# Patient Record
Sex: FEMALE | Race: WHITE | NOT HISPANIC OR LATINO | Employment: OTHER | URBAN - NONMETROPOLITAN AREA
[De-identification: names, ages, dates, MRNs, and addresses within clinical notes are randomized per-mention and may not be internally consistent; named-entity substitution may affect disease eponyms.]

---

## 2017-01-23 ENCOUNTER — COMMUNICATION - GICH (OUTPATIENT)
Dept: FAMILY MEDICINE | Facility: OTHER | Age: 67
End: 2017-01-23

## 2017-01-23 DIAGNOSIS — E87.6 HYPOKALEMIA: ICD-10-CM

## 2017-01-23 DIAGNOSIS — I10 ESSENTIAL (PRIMARY) HYPERTENSION: ICD-10-CM

## 2017-03-02 ENCOUNTER — COMMUNICATION - GICH (OUTPATIENT)
Dept: FAMILY MEDICINE | Facility: OTHER | Age: 67
End: 2017-03-02

## 2017-03-03 ENCOUNTER — OFFICE VISIT - GICH (OUTPATIENT)
Dept: FAMILY MEDICINE | Facility: OTHER | Age: 67
End: 2017-03-03

## 2017-03-03 ENCOUNTER — HOSPITAL ENCOUNTER (OUTPATIENT)
Dept: RADIOLOGY | Facility: OTHER | Age: 67
End: 2017-03-03
Attending: FAMILY MEDICINE

## 2017-03-03 ENCOUNTER — HISTORY (OUTPATIENT)
Dept: FAMILY MEDICINE | Facility: OTHER | Age: 67
End: 2017-03-03

## 2017-03-03 DIAGNOSIS — M53.3 SACROCOCCYGEAL DISORDERS, NOT ELSEWHERE CLASSIFIED: ICD-10-CM

## 2017-03-03 DIAGNOSIS — G89.29 OTHER CHRONIC PAIN: ICD-10-CM

## 2017-03-03 DIAGNOSIS — M54.41 LOW BACK PAIN WITH RIGHT-SIDED SCIATICA: ICD-10-CM

## 2017-03-06 ENCOUNTER — AMBULATORY - GICH (OUTPATIENT)
Dept: SCHEDULING | Facility: OTHER | Age: 67
End: 2017-03-06

## 2017-03-06 ENCOUNTER — AMBULATORY - GICH (OUTPATIENT)
Dept: FAMILY MEDICINE | Facility: OTHER | Age: 67
End: 2017-03-06

## 2017-03-06 ENCOUNTER — COMMUNICATION - GICH (OUTPATIENT)
Dept: FAMILY MEDICINE | Facility: OTHER | Age: 67
End: 2017-03-06

## 2017-03-06 DIAGNOSIS — M53.3 SACROCOCCYGEAL DISORDERS, NOT ELSEWHERE CLASSIFIED: ICD-10-CM

## 2017-03-06 DIAGNOSIS — M54.50 LOW BACK PAIN: ICD-10-CM

## 2017-03-06 DIAGNOSIS — G89.29 OTHER CHRONIC PAIN: ICD-10-CM

## 2017-03-09 ENCOUNTER — HOSPITAL ENCOUNTER (OUTPATIENT)
Dept: RADIOLOGY | Facility: OTHER | Age: 67
End: 2017-03-09
Attending: FAMILY MEDICINE

## 2017-03-09 DIAGNOSIS — M54.41 LOW BACK PAIN WITH RIGHT-SIDED SCIATICA: ICD-10-CM

## 2017-03-14 ENCOUNTER — COMMUNICATION - GICH (OUTPATIENT)
Dept: FAMILY MEDICINE | Facility: OTHER | Age: 67
End: 2017-03-14

## 2017-03-17 ENCOUNTER — HOSPITAL ENCOUNTER (OUTPATIENT)
Dept: RADIOLOGY | Facility: OTHER | Age: 67
End: 2017-03-17
Attending: FAMILY MEDICINE

## 2017-03-17 DIAGNOSIS — M53.3 SACROCOCCYGEAL DISORDERS, NOT ELSEWHERE CLASSIFIED: ICD-10-CM

## 2017-03-17 DIAGNOSIS — G89.29 OTHER CHRONIC PAIN: ICD-10-CM

## 2017-03-18 ENCOUNTER — AMBULATORY - GICH (OUTPATIENT)
Dept: SCHEDULING | Facility: OTHER | Age: 67
End: 2017-03-18

## 2017-03-23 ENCOUNTER — OFFICE VISIT - GICH (OUTPATIENT)
Dept: FAMILY MEDICINE | Facility: OTHER | Age: 67
End: 2017-03-23

## 2017-03-23 ENCOUNTER — HISTORY (OUTPATIENT)
Dept: FAMILY MEDICINE | Facility: OTHER | Age: 67
End: 2017-03-23

## 2017-03-23 DIAGNOSIS — L30.9 DERMATITIS: ICD-10-CM

## 2017-03-23 DIAGNOSIS — Z00.00 ENCOUNTER FOR GENERAL ADULT MEDICAL EXAMINATION WITHOUT ABNORMAL FINDINGS: ICD-10-CM

## 2017-03-23 DIAGNOSIS — E78.5 HYPERLIPIDEMIA: ICD-10-CM

## 2017-03-23 DIAGNOSIS — Z12.31 ENCOUNTER FOR SCREENING MAMMOGRAM FOR MALIGNANT NEOPLASM OF BREAST: ICD-10-CM

## 2017-03-23 DIAGNOSIS — I10 ESSENTIAL (PRIMARY) HYPERTENSION: ICD-10-CM

## 2017-03-23 DIAGNOSIS — M46.98 UNSPECIFIED INFLAMMATORY SPONDYLOPATHY, SACRAL AND SACROCOCCYGEAL REGION (H): ICD-10-CM

## 2017-03-23 DIAGNOSIS — E87.6 HYPOKALEMIA: ICD-10-CM

## 2017-03-23 DIAGNOSIS — G56.00 CARPAL TUNNEL SYNDROME: ICD-10-CM

## 2017-03-23 DIAGNOSIS — Z23 ENCOUNTER FOR IMMUNIZATION: ICD-10-CM

## 2017-03-23 LAB
ANION GAP - HISTORICAL: 9 (ref 5–18)
BUN SERPL-MCNC: 27 MG/DL (ref 7–25)
BUN/CREAT RATIO - HISTORICAL: 32
CALCIUM SERPL-MCNC: 9.9 MG/DL (ref 8.6–10.3)
CHLORIDE SERPLBLD-SCNC: 102 MMOL/L (ref 98–107)
CHOL/HDL RATIO - HISTORICAL: 3.82
CHOLESTEROL TOTAL: 210 MG/DL
CO2 SERPL-SCNC: 28 MMOL/L (ref 21–31)
CREAT SERPL-MCNC: 0.84 MG/DL (ref 0.7–1.3)
GFR IF NOT AFRICAN AMERICAN - HISTORICAL: >60 ML/MIN/1.73M2
GLUCOSE SERPL-MCNC: 103 MG/DL (ref 70–105)
HDLC SERPL-MCNC: 55 MG/DL (ref 23–92)
LDLC SERPL CALC-MCNC: 134 MG/DL
NON-HDL CHOLESTEROL - HISTORICAL: 155 MG/DL
PATIENT STATUS - HISTORICAL: ABNORMAL
POTASSIUM SERPL-SCNC: 3.7 MMOL/L (ref 3.5–5.1)
SODIUM SERPL-SCNC: 139 MMOL/L (ref 133–143)
TRIGL SERPL-MCNC: 106 MG/DL

## 2017-03-30 ENCOUNTER — HISTORY (OUTPATIENT)
Dept: RADIOLOGY | Facility: OTHER | Age: 67
End: 2017-03-30

## 2017-03-30 ENCOUNTER — HOSPITAL ENCOUNTER (OUTPATIENT)
Dept: RADIOLOGY | Facility: OTHER | Age: 67
End: 2017-03-30
Attending: FAMILY MEDICINE

## 2017-03-30 DIAGNOSIS — Z12.31 ENCOUNTER FOR SCREENING MAMMOGRAM FOR MALIGNANT NEOPLASM OF BREAST: ICD-10-CM

## 2017-04-07 ENCOUNTER — COMMUNICATION - GICH (OUTPATIENT)
Dept: FAMILY MEDICINE | Facility: OTHER | Age: 67
End: 2017-04-07

## 2017-04-07 DIAGNOSIS — E78.5 HYPERLIPIDEMIA: ICD-10-CM

## 2017-04-17 ENCOUNTER — AMBULATORY - GICH (OUTPATIENT)
Dept: FAMILY MEDICINE | Facility: OTHER | Age: 67
End: 2017-04-17

## 2017-04-21 ENCOUNTER — AMBULATORY - GICH (OUTPATIENT)
Dept: SCHEDULING | Facility: OTHER | Age: 67
End: 2017-04-21

## 2017-06-16 ENCOUNTER — AMBULATORY - GICH (OUTPATIENT)
Dept: FAMILY MEDICINE | Facility: OTHER | Age: 67
End: 2017-06-16

## 2017-06-16 ENCOUNTER — HISTORY (OUTPATIENT)
Dept: FAMILY MEDICINE | Facility: OTHER | Age: 67
End: 2017-06-16

## 2017-06-16 DIAGNOSIS — I10 ESSENTIAL (PRIMARY) HYPERTENSION: ICD-10-CM

## 2017-06-16 DIAGNOSIS — Z01.818 ENCOUNTER FOR OTHER PREPROCEDURAL EXAMINATION: ICD-10-CM

## 2017-06-16 DIAGNOSIS — E87.6 HYPOKALEMIA: ICD-10-CM

## 2017-06-16 DIAGNOSIS — G56.00 CARPAL TUNNEL SYNDROME: ICD-10-CM

## 2017-06-16 DIAGNOSIS — E78.5 HYPERLIPIDEMIA: ICD-10-CM

## 2017-06-16 LAB
ALT (SGPT) - HISTORICAL: 21 IU/L (ref 7–52)
ANION GAP - HISTORICAL: 11 (ref 5–18)
AST SERPL-CCNC: 21 IU/L (ref 13–39)
BUN SERPL-MCNC: 28 MG/DL (ref 7–25)
BUN/CREAT RATIO - HISTORICAL: 30
CALCIUM SERPL-MCNC: 9.9 MG/DL (ref 8.6–10.3)
CHLORIDE SERPLBLD-SCNC: 102 MMOL/L (ref 98–107)
CHOL/HDL RATIO - HISTORICAL: 2.28
CHOLESTEROL TOTAL: 114 MG/DL
CO2 SERPL-SCNC: 27 MMOL/L (ref 21–31)
CREAT SERPL-MCNC: 0.93 MG/DL (ref 0.7–1.3)
GFR IF NOT AFRICAN AMERICAN - HISTORICAL: 60 ML/MIN/1.73M2
GLUCOSE SERPL-MCNC: 102 MG/DL (ref 70–105)
HDLC SERPL-MCNC: 50 MG/DL (ref 23–92)
LDLC SERPL CALC-MCNC: 51 MG/DL
NON-HDL CHOLESTEROL - HISTORICAL: 64 MG/DL
PATIENT STATUS - HISTORICAL: NORMAL
POTASSIUM SERPL-SCNC: 3.4 MMOL/L (ref 3.5–5.1)
SODIUM SERPL-SCNC: 140 MMOL/L (ref 133–143)
TRIGL SERPL-MCNC: 63 MG/DL

## 2017-06-29 ENCOUNTER — AMBULATORY - GICH (OUTPATIENT)
Dept: LAB | Facility: OTHER | Age: 67
End: 2017-06-29

## 2017-06-29 DIAGNOSIS — E87.6 HYPOKALEMIA: ICD-10-CM

## 2017-06-29 LAB
ANION GAP - HISTORICAL: 6 (ref 5–18)
BUN SERPL-MCNC: 23 MG/DL (ref 7–25)
BUN/CREAT RATIO - HISTORICAL: 25
CALCIUM SERPL-MCNC: 9.9 MG/DL (ref 8.6–10.3)
CHLORIDE SERPLBLD-SCNC: 105 MMOL/L (ref 98–107)
CO2 SERPL-SCNC: 27 MMOL/L (ref 21–31)
CREAT SERPL-MCNC: 0.93 MG/DL (ref 0.7–1.3)
GFR IF NOT AFRICAN AMERICAN - HISTORICAL: 60 ML/MIN/1.73M2
GLUCOSE SERPL-MCNC: 119 MG/DL (ref 70–105)
POTASSIUM SERPL-SCNC: 3.8 MMOL/L (ref 3.5–5.1)
SODIUM SERPL-SCNC: 138 MMOL/L (ref 133–143)

## 2017-06-30 ENCOUNTER — COMMUNICATION - GICH (OUTPATIENT)
Dept: FAMILY MEDICINE | Facility: OTHER | Age: 67
End: 2017-06-30

## 2017-07-06 ENCOUNTER — AMBULATORY - GICH (OUTPATIENT)
Dept: FAMILY MEDICINE | Facility: OTHER | Age: 67
End: 2017-07-06

## 2017-07-06 DIAGNOSIS — E87.6 HYPOKALEMIA: ICD-10-CM

## 2017-12-27 NOTE — PROGRESS NOTES
"Patient Information     Patient Name MRN Arlyn Winters [id number] Female 1950      Progress Notes by Brooke Castaneda MD at 2017  8:45 AM     Author:  Brooke Castaneda MD Service:  (none) Author Type:  Physician     Filed:  2017 12:15 PM Encounter Date:  2017 Status:  Signed     :  Brooke Castaneda MD (Physician)            ----------------- PREOPERATIVE EXAM ------------------  2017    SUBJECTIVE:  Arlyn Holder is a 67 y.o. female here for preoperative optimization.    I was asked to see Arlyn Holder by Dr. Méndez for  preoperative evaluation due to hypertension.    Date of Surgery: 17  Type of Surgery:  Left Carpal tunnel release.  Surgeon: [facility]:  [facility]    HPI:  Has been dealing with carpal tunnel symptoms for the past 5 years.  Worse in the past year.  Now is waking her up with pain in the middle of the night.    Has had stiffness in her neck lately.  Her shoulders have not been painful.      Fever/Chills or other infectious symptoms in past month:  (NO)   >10lb weight loss in past two months:  (NO)     Health Care Directive/Code status: No living will.  Full code.  Hx of blood transfusions:   (NO)   History of VRE/MRSA:  (NO) Date: n/a Site: n/a    Preoperative Evaluation: Obstructive Sleep Apnea screening    S: Snore -  Do you snore loudly? (louder than talking or loud enough to be heard through closed doors)(NO)  T: Tired - Do you often feel tired, fatigued, or sleepy during the daytime?(YES)  O: Observed - Has anyone ever observed you stop breathing during your sleep?(NO)  P: Pressure - Do you have or are you being treated for high blood pressure?(YES)  B: BMI - BMI greater than 35kg/m2?(NO)  A: Age - Age over 50 years old?(YES)  N: Neck - Neck circumference greater than 40 cm?(NO)  G: Gender - Gender: Male?(NO)    Total number of \"YES\" responses:  3    Scoring: Low risk of ANURAG 0-2  At Risk of ANURAG: " >3 High Risk of ANURAG: 5-8        Patient Active Problem List       Diagnosis  Date Noted     Ascending aorta dilatation (HC)  2016     3.5-3.6 cm on CT 3/2016 - follow up in 1 year        Health care maintenance  2015     Arthritis of right wrist  2013     GERD (gastroesophageal reflux disease)  2012     Right shoulder pain  2012     Tendonitis of wrist, right  2012     METATARSALGIA       PRONATION FOOT OR ANKLE, ACQUIRED       OSTEOARTHRITIS OF FEET  10/04/2011     PLANTAR FASCIITIS  10/04/2011     SKIN LESIONS, MULTIPLE  10/04/2011     HYPERLIPIDEMIA  10/04/2011     CARPAL TUNNEL SYNDROME  2010     ROTATOR CUFF SYNDROME, RIGHT  2010     HYPERTENSION       CHONDROMALACIA PATELLA       POSTMENOPAUSAL STATUS       Postmenopausal now off hormone replacement therapy.          UTERINE PROLAPSE       preloader comment: selections available to preload differs from paper chart.    *Pelvic floor relaxation          SUBLUXATION, JOINT       preloader comment: selections available to preload differs from paper chart.    *Subluxation L3-L4 with mild stenosis          DEGENERATIVE DISC DISEASE, CERVICAL SPINE       Multilevel degenerative disk disease in cervical lumbar spine without   radiculopathy.  Add also possible right cubital tunnel syndrome at elbow.          BURSITIS, RIGHT HIP         Past Medical History:     Diagnosis  Date     Ascending aorta dilatation (HC) 3/23/2016    3.5-3.6 cm on CT 3/2016 - follow up in 1 year       Bunion, right foot      Carpal tunnel syndrome 2010     GERD (gastroesophageal reflux disease) 12/3/2012     History of pregnancy      3, Para 3 with three vaginal deliveries      Hyperlipidemia 10/4/2011     HYPERTENSION      Impingement syndrome of right shoulder 10/09/07     Uterine prolapse     preloader comment: selections available to preload differs from paper chart.   *Pelvic floor relaxation         Past Surgical History:       Procedure  Laterality Date     BUNIONECTOMY  1999    right       CARPAL TUNNEL RELEASE Right     Dr. Méndez       COLONOSCOPY DIAGNOSTIC  3/16/15     F/U 2025       COLONOSCOPY SCREENING  2004     normal       PELVIC LAPAROSCOPY  1989    for pelvic adhesion       PELVIC LAPAROSCOPY  11/08    vaginal vault prolapse - Dr. Mumtaz Marshall - anterior/posterior repair with bladder mesh.       VAGINAL HYSTERECTOMY  1982    for uterine prolapse with A&P repair.  Tube and ovaries still in place.           Current Outpatient Prescriptions       Medication  Sig Dispense Refill     atorvastatin (LIPITOR) 20 mg tablet Take 1 tablet by mouth once daily. 90 tablet 3     cod liver oil oil Take  by mouth.       Coenzyme Q10 10 mg cap Take 1 capsule by mouth once daily.       CYANOCOBALAMIN, VITAMIN B-12, ORAL Take  by mouth.       cyclobenzaprine (FLEXERIL) 10 mg tablet Take 1 tablet by mouth 3 times daily. 30 tablet 0     diclofenac 1 % topical (VOLTAREN) gel Apply  topically to affected area(s) 2 times daily. 100 g 11     flaxseed-omega3,6,9-fatty acid 1,200-540-132 mg cap Take 1 capsule by mouth 3 times daily.  0     glucosamine frank 2KCl-chondroit 500-400 mg tab Take  by mouth.       hydroCHLOROthiazide (HCTZ) 25 mg tablet Take 1 tablet by mouth once daily. 90 tablet 3     Lecithin 400 mg capsule Take 1 capsule by mouth 2 times daily.       omega-3 acid ethyl esters (LOVAZA;OMACOR) 1 gram capsule Take 1,000 mg by mouth.       potassium chloride (K-DUR) 20 mEq Extended-Release tablet Take 1 tablet by mouth once daily with a meal. 90 tablet 3     Red Yeast Rice Extract 600 mg cap Take  by mouth.       triamcinolone (ARISTOCORT; KENALOG) 0.1 % cream Apply  topically to affected area(s) 2 times daily. 30 g 11     vitamin e 100 unit capsule Take  by mouth.       No current facility-administered medications for this visit.      Medications have been reviewed by me and are current to the best of my knowledge and ability.    Recent use  of: aspirin    Allergies:  Allergies      Allergen   Reactions     Codeine  Nausea And Vomiting     Naprosyn [Naproxen]  Other - Describe In Comment Field     Mouth sores      Sulfa (Sulfonamide Antibiotics)  *Unknown - Childhood Rxn     Sulfacetamide Sodium  Rash     Had a reaction to sulfa drug but is not sure which one it was-states the reaction resulted in redness      Valium [Diazepam]  Hallucinations       Latex allergy  no    Immunizations:  Immunization History     Administered  Date(s) Administered     Influenza Virus, Unspecified 11/02/2006, 10/01/2007, 10/24/2008, 11/18/2010, 10/04/2011     Influenza, High-dose Inactivated 02/17/2015     Influenza, IIV3 (Age >=3 years) 12/03/2012, 11/07/2013     Pneumococcal Poly,23-Valent (Pneumovax) 12/13/2013     Pneumococcal conj 13-Valent (Prevnar 13) 03/23/2017     Tdap 11/18/2010, 12/03/2012       Family History       Problem   Relation Age of Onset     Diabetes  Father      Hypertension  Father      Heart Disease  Father      Other  Father       hearing loss, kidney problems.       Cancer-breast  Sister      Heart Disease  Mother      Hypertension  Mother      Cancer  Maternal Grandmother      bone cancer in her leg       Psychiatric illness  Maternal Grandmother      depression       Diabetes  Sister      Heart Disease  Sister      angioplasty CAD       Other  Sister      Right ear deafness - surgically repaired       Other  Sister      depression       Diabetes  Sister      Hypertension  Sister      Other  Sister      car accident age 75 with multiple fractures - in nursing home.       Heart Disease  Brother      Heart Disease  Brother      Hypertension  Brother      Other  Brother      vertigo, also kidney problems.       Heart Disease  Son      MI - first at age 48         Denies family hx of bleeding tendencies, anesthesia complications, or other problems with surgery.    Social History       Substance Use Topics         Smoking status:   Passive Smoke  "Exposure - Never Smoker     Smokeless tobacco:   Never Used      Comment:  was a smoker      Alcohol use   No       ROS:    Surgical:  patient denies previous complications from prior surgeries including but not limited to prolonged bleeding, anesthesia complications, dysrhythmias, surgical wound infections, or prolonged hospital stay.  Cardiorespiratory: denies chest pain, palpitations, shortness of breath, cough. Most exertion in the past 2 weeks was yardwork, gone for walks, housework.  Complete ROS otherwise negative except as noted in HPI.     -------------------------------------------------------------    PHYSICAL EXAM:  /98  Pulse 68  Temp 98.1  F (36.7  C) (Tympanic)  Ht 1.477 m (4' 10.17\")  Wt 65.5 kg (144 lb 6.4 oz)  SpO2 96%  Breastfeeding? No  BMI 30 kg/m2    EXAM:  General Appearance: Pleasant, alert, appropriate appearance for age. No acute distress  Head Exam: Normal. Normocephalic, atraumatic.  Eyes: PERRL, EOMI  Ears: Normal TM's bilaterally.   OroPharynx: Mucosa pink and moist. Dentition in good repair.  Neck: Supple, no masses or nodes, no lymphadenopathy.  No thyromegaly.  Lungs: Normal chest wall and respirations. Clear to auscultation, no wheezes or crackles.  Cardiovascular: Regular rate and rhythm. S1, S2, no murmurs.  Gastrointestinal: Soft, nontender, no abnormal masses or organomegaly. BS normal   Musculoskeletal: No edema. No warm or erythematous joints.  Spasm in left trapezius region.  Skin: no concerning or new rashes.  Neurologic Exam: CN 2-12 grossly intact.  Normal gait.  Symmetric DTRs, normal gross motor movement, tone, and coordination. No tremor.  Psychiatric Exam: Alert and oriented, appropriate affect.      EKG:  Sinus bradycardia with a rate of 59 beats per minute.  No ST or T wave changes noted.    Results for orders placed or performed in visit on 06/16/17      LIPID PANEL      Result  Value Ref Range    CHOLESTEROL,TOTAL 114 <200 mg/dL    TRIGLYCERIDES " 63 <150 mg/dL    HDL CHOLESTEROL 50 23 - 92 mg/dL    NON-HDL CHOLESTEROL 64 <145 mg/dl    CHOL/HDL RATIO            2.28 <4.50                    LDL CHOLESTEROL 51 <100 mg/dL    PATIENT STATUS            FASTING                   AST (SGOT)      Result  Value Ref Range    AST (SGOT) 21 13 - 39 IU/L   ALT (SGPT)      Result  Value Ref Range    ALT (SGPT) 21 7 - 52 IU/L   BASIC METABOLIC PANEL      Result  Value Ref Range    SODIUM 140 133 - 143 mmol/L    POTASSIUM 3.4 (L) 3.5 - 5.1 mmol/L    CHLORIDE 102 98 - 107 mmol/L    CO2,TOTAL 27 21 - 31 mmol/L    ANION GAP 11 5 - 18                    GLUCOSE 102 70 - 105 mg/dL    CALCIUM 9.9 8.6 - 10.3 mg/dL    BUN 28 (H) 7 - 25 mg/dL    CREATININE 0.93 0.70 - 1.30 mg/dL    BUN/CREAT RATIO           30                    GFR if African American >60 >60 ml/min/1.73m2    GFR if not African American 60 (L) >60 ml/min/1.73m2      ---------------------------------------------------------------    ASSESSEMENT AND PLAN:    Arlyn was seen today for preoperative exam.    Diagnoses and all orders for this visit:    Preop examination    Carpal tunnel syndrome, unspecified laterality    HYPERTENSION  -     EKG 12 LEAD UNIT PERFORMED (PERFORMED TODAY)  -     BASIC METABOLIC PANEL; Future  -     AL ELECTROCARDIOGRAM TRACING  -     BASIC METABOLIC PANEL    Hyperlipidemia, unspecified hyperlipidemia type  -     LIPID PANEL  -     AST (SGOT)  -     ALT (SGPT)    Hypokalemia  -     potassium chloride (K-DUR) 20 mEq Extended-Release tablet; Take 1 tablet by mouth once daily with a meal.        PRE OP RECOMMENDATIONS:    No personal or family history of problems with bleeding or anesthesia. She has had a lot of vomiting with anesthesia in the past.  Patient is able to tolerate greater than 4 METs of activity without any cardiopulmonary symptoms. ASA PS class 2 . No cardiopulmonary workup is neccessary for the current procedure. Please contact the office with any questions or  concerns.    Patient is on chronic pain medications (NO);   Patient is on antiplatlet/anticoagulation (YES)  Other medications that need adjustment perioperatively (YES)    Other:  Patient was advised to call our office and the surgical services with any change in condition or new symptoms if they were to develop between today and their surgical date; especially any cardiopulmonary symptoms or symptoms concerning for an infection.    Lipids improved with addition of atorvastatin.    Mildly low potassium today.  She has been taking her potassium Chloride 10 meq consistently daily.  Will increase to 20 meq daily.    Patient Instructions   Hold aspirin, cod liver oil, flaxseed omega, vitamin E and other supplements at least a week prior to surgery.    The morning of surgery, take only your Hydrochlorothiazide and Potassium Chloride with a small sip of water.       Brooke Castaneda MD ....................  6/16/2017   12:14 PM

## 2017-12-28 NOTE — PATIENT INSTRUCTIONS
Patient Information     Patient Name MRN Arlyn Winters [id number] Female 1950      Patient Instructions by Brooke Castaneda MD at 2017  8:45 AM     Author:  Brooke Castaneda MD Service:  (none) Author Type:  Physician     Filed:  2017  9:21 AM Encounter Date:  2017 Status:  Signed     :  Brooke Castaneda MD (Physician)            Hold aspirin, cod liver oil, flaxseed omega, vitamin E and other supplements at least a week prior to surgery.    The morning of surgery, take only your Hydrochlorothiazide and Potassium Chloride with a small sip of water.

## 2017-12-28 NOTE — TELEPHONE ENCOUNTER
Patient Information     Patient Name MRN Sex Arlyn Ron [id number] Female 1950      Telephone Encounter by Madelin Cifuentes RN at 2017 12:55 AM     Author:  Madelin Cifuentes RN Service:  (none) Author Type:  NURS- Registered Nurse     Filed:  2017 12:58 AM Encounter Date:  2017 Status:  Signed     :  Madelin Cifuentes RN (NURS- Registered Nurse)            Patient states she was in to have lab work drawn today and was to be notifies if she was okay for surgery in am or not. Attempted to contact Dr. Roger Roman but unable to reach. Potassium was 3.8 today. Dr. Quesada contacted and confirmed that patient was okay for surgery. Return call made to patient.

## 2017-12-29 NOTE — H&P
"Patient Information     Patient Name MRN Arlyn Winters [id number] Female 1950      H&P by Brooke Castaneda MD at 2017  8:45 AM     Author:  Brooke Castaneda MD Service:  (none) Author Type:  Physician     Filed:  2017 12:15 PM Encounter Date:  2017 Status:  Signed     :  Brooke Castaneda MD (Physician)            ----------------- PREOPERATIVE EXAM ------------------  2017    SUBJECTIVE:  Arlyn Holder is a 67 y.o. female here for preoperative optimization.    I was asked to see Arlyn Holder by Dr. Méndez for  preoperative evaluation due to hypertension.    Date of Surgery: 17  Type of Surgery:  Left Carpal tunnel release.  Surgeon: [facility]:  [facility]    HPI:  Has been dealing with carpal tunnel symptoms for the past 5 years.  Worse in the past year.  Now is waking her up with pain in the middle of the night.    Has had stiffness in her neck lately.  Her shoulders have not been painful.      Fever/Chills or other infectious symptoms in past month:  (NO)   >10lb weight loss in past two months:  (NO)     Health Care Directive/Code status: No living will.  Full code.  Hx of blood transfusions:   (NO)   History of VRE/MRSA:  (NO) Date: n/a Site: n/a    Preoperative Evaluation: Obstructive Sleep Apnea screening    S: Snore -  Do you snore loudly? (louder than talking or loud enough to be heard through closed doors)(NO)  T: Tired - Do you often feel tired, fatigued, or sleepy during the daytime?(YES)  O: Observed - Has anyone ever observed you stop breathing during your sleep?(NO)  P: Pressure - Do you have or are you being treated for high blood pressure?(YES)  B: BMI - BMI greater than 35kg/m2?(NO)  A: Age - Age over 50 years old?(YES)  N: Neck - Neck circumference greater than 40 cm?(NO)  G: Gender - Gender: Male?(NO)    Total number of \"YES\" responses:  3    Scoring: Low risk of ANURAG 0-2  At Risk of ANURAG: >3 High Risk of " ANURAG: 5-8        Patient Active Problem List       Diagnosis  Date Noted     Ascending aorta dilatation (HC)  2016     3.5-3.6 cm on CT 3/2016 - follow up in 1 year        Health care maintenance  2015     Arthritis of right wrist  2013     GERD (gastroesophageal reflux disease)  2012     Right shoulder pain  2012     Tendonitis of wrist, right  2012     METATARSALGIA       PRONATION FOOT OR ANKLE, ACQUIRED       OSTEOARTHRITIS OF FEET  10/04/2011     PLANTAR FASCIITIS  10/04/2011     SKIN LESIONS, MULTIPLE  10/04/2011     HYPERLIPIDEMIA  10/04/2011     CARPAL TUNNEL SYNDROME  2010     ROTATOR CUFF SYNDROME, RIGHT  2010     HYPERTENSION       CHONDROMALACIA PATELLA       POSTMENOPAUSAL STATUS       Postmenopausal now off hormone replacement therapy.          UTERINE PROLAPSE       preloader comment: selections available to preload differs from paper chart.    *Pelvic floor relaxation          SUBLUXATION, JOINT       preloader comment: selections available to preload differs from paper chart.    *Subluxation L3-L4 with mild stenosis          DEGENERATIVE DISC DISEASE, CERVICAL SPINE       Multilevel degenerative disk disease in cervical lumbar spine without   radiculopathy.  Add also possible right cubital tunnel syndrome at elbow.          BURSITIS, RIGHT HIP         Past Medical History:     Diagnosis  Date     Ascending aorta dilatation (HC) 3/23/2016    3.5-3.6 cm on CT 3/2016 - follow up in 1 year       Bunion, right foot      Carpal tunnel syndrome 2010     GERD (gastroesophageal reflux disease) 12/3/2012     History of pregnancy      3, Para 3 with three vaginal deliveries      Hyperlipidemia 10/4/2011     HYPERTENSION      Impingement syndrome of right shoulder 10/09/07     Uterine prolapse     preloader comment: selections available to preload differs from paper chart.   *Pelvic floor relaxation         Past Surgical History:      Procedure   Laterality Date     BUNIONECTOMY  1999    right       CARPAL TUNNEL RELEASE Right     Dr. Méndez       COLONOSCOPY DIAGNOSTIC  3/16/15     F/U 2025       COLONOSCOPY SCREENING  2004     normal       PELVIC LAPAROSCOPY  1989    for pelvic adhesion       PELVIC LAPAROSCOPY  11/08    vaginal vault prolapse - Dr. Mumtaz Marshall - anterior/posterior repair with bladder mesh.       VAGINAL HYSTERECTOMY  1982    for uterine prolapse with A&P repair.  Tube and ovaries still in place.           Current Outpatient Prescriptions       Medication  Sig Dispense Refill     atorvastatin (LIPITOR) 20 mg tablet Take 1 tablet by mouth once daily. 90 tablet 3     cod liver oil oil Take  by mouth.       Coenzyme Q10 10 mg cap Take 1 capsule by mouth once daily.       CYANOCOBALAMIN, VITAMIN B-12, ORAL Take  by mouth.       cyclobenzaprine (FLEXERIL) 10 mg tablet Take 1 tablet by mouth 3 times daily. 30 tablet 0     diclofenac 1 % topical (VOLTAREN) gel Apply  topically to affected area(s) 2 times daily. 100 g 11     flaxseed-omega3,6,9-fatty acid 1,200-540-132 mg cap Take 1 capsule by mouth 3 times daily.  0     glucosamine frank 2KCl-chondroit 500-400 mg tab Take  by mouth.       hydroCHLOROthiazide (HCTZ) 25 mg tablet Take 1 tablet by mouth once daily. 90 tablet 3     Lecithin 400 mg capsule Take 1 capsule by mouth 2 times daily.       omega-3 acid ethyl esters (LOVAZA;OMACOR) 1 gram capsule Take 1,000 mg by mouth.       potassium chloride (K-DUR) 20 mEq Extended-Release tablet Take 1 tablet by mouth once daily with a meal. 90 tablet 3     Red Yeast Rice Extract 600 mg cap Take  by mouth.       triamcinolone (ARISTOCORT; KENALOG) 0.1 % cream Apply  topically to affected area(s) 2 times daily. 30 g 11     vitamin e 100 unit capsule Take  by mouth.       No current facility-administered medications for this visit.      Medications have been reviewed by me and are current to the best of my knowledge and ability.    Recent use of:  aspirin    Allergies:  Allergies      Allergen   Reactions     Codeine  Nausea And Vomiting     Naprosyn [Naproxen]  Other - Describe In Comment Field     Mouth sores      Sulfa (Sulfonamide Antibiotics)  *Unknown - Childhood Rxn     Sulfacetamide Sodium  Rash     Had a reaction to sulfa drug but is not sure which one it was-states the reaction resulted in redness      Valium [Diazepam]  Hallucinations       Latex allergy  no    Immunizations:  Immunization History     Administered  Date(s) Administered     Influenza Virus, Unspecified 11/02/2006, 10/01/2007, 10/24/2008, 11/18/2010, 10/04/2011     Influenza, High-dose Inactivated 02/17/2015     Influenza, IIV3 (Age >=3 years) 12/03/2012, 11/07/2013     Pneumococcal Poly,23-Valent (Pneumovax) 12/13/2013     Pneumococcal conj 13-Valent (Prevnar 13) 03/23/2017     Tdap 11/18/2010, 12/03/2012       Family History       Problem   Relation Age of Onset     Diabetes  Father      Hypertension  Father      Heart Disease  Father      Other  Father       hearing loss, kidney problems.       Cancer-breast  Sister      Heart Disease  Mother      Hypertension  Mother      Cancer  Maternal Grandmother      bone cancer in her leg       Psychiatric illness  Maternal Grandmother      depression       Diabetes  Sister      Heart Disease  Sister      angioplasty CAD       Other  Sister      Right ear deafness - surgically repaired       Other  Sister      depression       Diabetes  Sister      Hypertension  Sister      Other  Sister      car accident age 75 with multiple fractures - in nursing home.       Heart Disease  Brother      Heart Disease  Brother      Hypertension  Brother      Other  Brother      vertigo, also kidney problems.       Heart Disease  Son      MI - first at age 48         Denies family hx of bleeding tendencies, anesthesia complications, or other problems with surgery.    Social History       Substance Use Topics         Smoking status:   Passive Smoke Exposure -  "Never Smoker     Smokeless tobacco:   Never Used      Comment:  was a smoker      Alcohol use   No       ROS:    Surgical:  patient denies previous complications from prior surgeries including but not limited to prolonged bleeding, anesthesia complications, dysrhythmias, surgical wound infections, or prolonged hospital stay.  Cardiorespiratory: denies chest pain, palpitations, shortness of breath, cough. Most exertion in the past 2 weeks was yardwork, gone for walks, housework.  Complete ROS otherwise negative except as noted in HPI.     -------------------------------------------------------------    PHYSICAL EXAM:  /98  Pulse 68  Temp 98.1  F (36.7  C) (Tympanic)  Ht 1.477 m (4' 10.17\")  Wt 65.5 kg (144 lb 6.4 oz)  SpO2 96%  Breastfeeding? No  BMI 30 kg/m2    EXAM:  General Appearance: Pleasant, alert, appropriate appearance for age. No acute distress  Head Exam: Normal. Normocephalic, atraumatic.  Eyes: PERRL, EOMI  Ears: Normal TM's bilaterally.   OroPharynx: Mucosa pink and moist. Dentition in good repair.  Neck: Supple, no masses or nodes, no lymphadenopathy.  No thyromegaly.  Lungs: Normal chest wall and respirations. Clear to auscultation, no wheezes or crackles.  Cardiovascular: Regular rate and rhythm. S1, S2, no murmurs.  Gastrointestinal: Soft, nontender, no abnormal masses or organomegaly. BS normal   Musculoskeletal: No edema. No warm or erythematous joints.  Spasm in left trapezius region.  Skin: no concerning or new rashes.  Neurologic Exam: CN 2-12 grossly intact.  Normal gait.  Symmetric DTRs, normal gross motor movement, tone, and coordination. No tremor.  Psychiatric Exam: Alert and oriented, appropriate affect.      EKG:  Sinus bradycardia with a rate of 59 beats per minute.  No ST or T wave changes noted.    Results for orders placed or performed in visit on 06/16/17      LIPID PANEL      Result  Value Ref Range    CHOLESTEROL,TOTAL 114 <200 mg/dL    TRIGLYCERIDES 63 <150 " mg/dL    HDL CHOLESTEROL 50 23 - 92 mg/dL    NON-HDL CHOLESTEROL 64 <145 mg/dl    CHOL/HDL RATIO            2.28 <4.50                    LDL CHOLESTEROL 51 <100 mg/dL    PATIENT STATUS            FASTING                   AST (SGOT)      Result  Value Ref Range    AST (SGOT) 21 13 - 39 IU/L   ALT (SGPT)      Result  Value Ref Range    ALT (SGPT) 21 7 - 52 IU/L   BASIC METABOLIC PANEL      Result  Value Ref Range    SODIUM 140 133 - 143 mmol/L    POTASSIUM 3.4 (L) 3.5 - 5.1 mmol/L    CHLORIDE 102 98 - 107 mmol/L    CO2,TOTAL 27 21 - 31 mmol/L    ANION GAP 11 5 - 18                    GLUCOSE 102 70 - 105 mg/dL    CALCIUM 9.9 8.6 - 10.3 mg/dL    BUN 28 (H) 7 - 25 mg/dL    CREATININE 0.93 0.70 - 1.30 mg/dL    BUN/CREAT RATIO           30                    GFR if African American >60 >60 ml/min/1.73m2    GFR if not African American 60 (L) >60 ml/min/1.73m2      ---------------------------------------------------------------    ASSESSEMENT AND PLAN:    Arlyn was seen today for preoperative exam.    Diagnoses and all orders for this visit:    Preop examination    Carpal tunnel syndrome, unspecified laterality    HYPERTENSION  -     EKG 12 LEAD UNIT PERFORMED (PERFORMED TODAY)  -     BASIC METABOLIC PANEL; Future  -     WA ELECTROCARDIOGRAM TRACING  -     BASIC METABOLIC PANEL    Hyperlipidemia, unspecified hyperlipidemia type  -     LIPID PANEL  -     AST (SGOT)  -     ALT (SGPT)    Hypokalemia  -     potassium chloride (K-DUR) 20 mEq Extended-Release tablet; Take 1 tablet by mouth once daily with a meal.        PRE OP RECOMMENDATIONS:    No personal or family history of problems with bleeding or anesthesia. She has had a lot of vomiting with anesthesia in the past.  Patient is able to tolerate greater than 4 METs of activity without any cardiopulmonary symptoms. ASA PS class 2 . No cardiopulmonary workup is neccessary for the current procedure. Please contact the office with any questions or concerns.    Patient is on  chronic pain medications (NO);   Patient is on antiplatlet/anticoagulation (YES)  Other medications that need adjustment perioperatively (YES)    Other:  Patient was advised to call our office and the surgical services with any change in condition or new symptoms if they were to develop between today and their surgical date; especially any cardiopulmonary symptoms or symptoms concerning for an infection.    Lipids improved with addition of atorvastatin.    Mildly low potassium today.  She has been taking her potassium Chloride 10 meq consistently daily.  Will increase to 20 meq daily.    Patient Instructions   Hold aspirin, cod liver oil, flaxseed omega, vitamin E and other supplements at least a week prior to surgery.    The morning of surgery, take only your Hydrochlorothiazide and Potassium Chloride with a small sip of water.       Brooke Castaneda MD ....................  6/16/2017   12:14 PM

## 2018-01-03 NOTE — TELEPHONE ENCOUNTER
Patient Information     Patient Name MRN Arlyn Winters [id number] Female 1950      Telephone Encounter by Brooke Castaneda MD at 3/6/2017  4:01 PM     Author:  Brooke Castaneda MD Service:  (none) Author Type:  Physician     Filed:  3/6/2017  4:01 PM Encounter Date:  3/6/2017 Status:  Signed     :  Brooke Castaneda MD (Physician)            Lidoderm patch is not covered.  Instead, recommend trying Voltaren topical gel twice daily as needed.  Prescription sent to pharmacy.  Brooke Castaneda MD ....................  3/6/2017   4:01 PM

## 2018-01-03 NOTE — TELEPHONE ENCOUNTER
Patient Information     Patient Name MRN Arlyn Winters [id number] Female 1950      Telephone Encounter by Ami Grullon at 3/14/2017  2:06 PM     Author:  Ami Grullon Service:  (none) Author Type:  (none)     Filed:  3/14/2017  2:06 PM Encounter Date:  3/14/2017 Status:  Signed     :  Ami Grullon            I printed a form instead of trying to do the electronic form myself.  It is in your in box.  Ami Grullon CMA (AAMA)................ 3/14/2017 2:06 PM

## 2018-01-03 NOTE — TELEPHONE ENCOUNTER
Patient Information     Patient Name MRN Arlyn Winters [id number] Female 1950      Telephone Encounter by Michell Meyer at 3/14/2017 11:20 AM     Author:  Michell Meyer Service:  (none) Author Type:  (none)     Filed:  3/14/2017 11:24 AM Encounter Date:  3/14/2017 Status:  Signed     :  Michell Meyer            [facility] sent a faxed a notice regarding given a temporary supply of the following prescription for diclofenac Gel 1 %.   Form in IN BOX.   Michell Meyer LPN ..........3/14/2017 11:23 AM

## 2018-01-03 NOTE — PROGRESS NOTES
Patient Information     Patient Name MRN Sex Arlyn Ron [id number] Female 1950      Progress Notes by Dana Staley at 3/17/2017 12:44 PM     Author:  Dana Staley Service:  (none) Author Type:  (none)     Filed:  3/17/2017 12:45 PM Date of Service:  3/17/2017 12:44 PM Status:  Signed     :  Dana Staley            Falls Risk Criteria:    Age 65 and older or under age 4        Sensory deficits    Poor vision    Use of ambulatory aides    Impaired judgment    Unable to walk independently    Meets High Risk criteria for falls:  Yes             1.  Do you have dizziness or vertigo?    no                    2.  Do you need help standing or walking?   no                 3.  Have you fallen within the last 6 months?    no           4.  Has the patient been fasting?      no       If any risks are marked Yes, the following interventions are utilized:    Do not leave patient unattended     Assist patient in the dressing room and bathroom    Have ambulatory aides available throughout procedure    Involve patient s family if available

## 2018-01-03 NOTE — PROGRESS NOTES
Patient Information     Patient Name MRN Sex Arlyn Ron [id number] Female 1950      Progress Notes by Dana Staley at 3/17/2017 12:45 PM     Author:  Dana Staley Service:  (none) Author Type:  (none)     Filed:  3/17/2017 12:45 PM Date of Service:  3/17/2017 12:45 PM Status:  Signed     :  Dana Staley            [facility] Protocol    A. Pre-procedure verification complete yes  1-relevant information / documentation available, reviewed and properly matched to the patient; 2-consent accurate and complete, 3-equipment and supplies available    B. Site marking complete Yes  Site marked if not in continuous attendance with patient    C. TIME OUT completed yes  Time Out was conducted just prior to starting procedure to verify the eight required elements: 1-patient identity, 2-consent accurate and complete, 3-position, 4-correct side/site marked (if applicable), 5-procedure, 6-relevant images / results properly labeled and displayed (if applicable), 7-antibiotics / irrigation fluids (if applicable), 8-safety precautions.    y

## 2018-01-03 NOTE — TELEPHONE ENCOUNTER
Patient Information     Patient Name MRArlyn Powell [id number] Female 1950      Telephone Encounter by Jesusita Zavala at 3/2/2017  2:51 PM     Author:  Jesusita Zavala Service:  (none) Author Type:  (none)     Filed:  3/2/2017  2:53 PM Encounter Date:  3/2/2017 Status:  Signed     :  Jesusita Zavala            Patient called and is wondering if she could get a call back from [facility] or [facility] in regards to some questions she has about getting a cortisone injection.  Thank you!     Jesusita Zavala ....................  3/2/2017   2:52 PM

## 2018-01-03 NOTE — PROGRESS NOTES
Patient Information     Patient Name MRN Sex Arlyn Ron [id number] Female 1950      Progress Notes by Priscilla Altamirano at 3/9/2017 12:09 PM     Author:  Priscilla Altamirano Service:  (none) Author Type:  Other Clinical Staff     Filed:  3/9/2017 12:09 PM Date of Service:  3/9/2017 12:09 PM Status:  Signed     :  Priscilla Altamirano (Other Clinical Staff)            Falls Risk Criteria:    Age 65 and older or under age 4        Sensory deficits    Poor vision    Use of ambulatory aides    Impaired judgment    Unable to walk independently    Meets High Risk criteria for falls:  Yes             1.  Do you have dizziness or vertigo?    no                    2.  Do you need help standing or walking?   no                 3.  Have you fallen within the last 6 months?    no           4.  Has the patient been fasting?      no       If any risks are marked Yes, the following interventions are utilized:    Do not leave patient unattended     Assist patient in the dressing room and bathroom    Have ambulatory aides available throughout procedure    Involve patient s family if available

## 2018-01-03 NOTE — TELEPHONE ENCOUNTER
Patient Information     Patient Name MRN Arlyn Winters [id number] Female 1950      Telephone Encounter by Brooke Castaneda MD at 3/14/2017  3:39 PM     Author:  Brooke Castaneda MD Service:  (none) Author Type:  Physician     Filed:  3/14/2017  3:39 PM Encounter Date:  3/14/2017 Status:  Signed     :  Brooke Castaneda MD (Physician)            Prior authorization in outbox.  Brooke Castaneda MD ....................  3/14/2017   3:39 PM

## 2018-01-03 NOTE — TELEPHONE ENCOUNTER
Patient Information     Patient Name MRN Sex Arlyn Ron [id number] Female 1950      Telephone Encounter by Ami Grullon at 3/6/2017  2:45 PM     Author:  Ami Grullon Service:  (none) Author Type:  (none)     Filed:  3/6/2017  2:48 PM Encounter Date:  3/6/2017 Status:  Signed     :  Ami Grullon            Fax stating lidocaine 5% patch is not covered.  Complete PA or change prescription?  Ami Grullon CMA (AAMA)................ 3/6/2017 2:48 PM

## 2018-01-03 NOTE — TELEPHONE ENCOUNTER
Patient Information     Patient Name MRArlyn Powell [id number] Female 1950      Telephone Encounter by Kristyn Kraus at 3/6/2017  4:04 PM     Author:  Kristyn Kraus Service:  (none) Author Type:  (none)     Filed:  3/6/2017  4:04 PM Encounter Date:  3/6/2017 Status:  Signed     :  Kristyn Kraus            Patient notified of new prescription  Kristyn Kraus LPN..............................3/6/2017  4:04 PM

## 2018-01-03 NOTE — PROGRESS NOTES
Patient Information     Patient Name MRN Arlyn Winters [id number] Female 1950      Progress Notes by Dana Staley at 3/17/2017 12:45 PM     Author:  Dana Staley Service:  (none) Author Type:  (none)     Filed:  3/17/2017 12:45 PM Date of Service:  3/17/2017 12:45 PM Status:  Signed     :  Dana Staley            RECOVERY TIME  You may experience numbness and/or relief of your pain for up to 4-6 hours after the injection.  Your usual symptoms may return the night of the procedure and may possible be more severe than usual a day or two following.  Please keep track of your pain over the next several days and report how long the relief lasts to the doctor who referred you for this procedure.    The beneficial effects of the steroids usually require 2 to 3 days to take effect, buy may take as long as 5 to 7 days.  If there is no change in the pain, then investigation can be focused on other possible sources of your pain.  In either case, the information is useful to the doctor who referred you for this procedure.    POSSIBLE SIDE EFFECTS  Facial flushing (redness), occasional low grade fevers of 99.5F or less, hiccups, insomnia, headaches, increased heart rate, abdominal cramping, and/or a bloating feeling are side effects of the steroid medications and will go away 3 to 4 days after the injection.    Diabetic Patients  The steroids you have received may significantly increase your blood sugar levels.  Monitor your blood sugar level closely (4-6 times per day) for a period of 4 days or until your blood sugar level normalizes.  If your blood sugar level elevates significantly or you experience confusion, dizziness, sweating, please notify our primary physician and make him/her aware that you have received steroids.

## 2018-01-03 NOTE — PROGRESS NOTES
Patient Information     Patient Name MRN Sex Arlyn Ron [id number] Female 1950      Progress Notes by Brooke Castaneda MD at 3/3/2017  2:30 PM     Author:  Brooke Castaneda MD Service:  (none) Author Type:  Physician     Filed:  3/3/2017  8:43 PM Encounter Date:  3/3/2017 Status:  Signed     :  Brooke Castaneda MD (Physician)            SUBJECTIVE:    Arlyn Holder is a 66 y.o. female who presents for low back pain for the past week.  Also has had some areas in her upper back that are hot and tingling.  Trying to wash with sulfur soap to see if it would help.  Has pain around her bra line.  Any time she is jostled, as in the car, pain is worse.  No fever.  Has been feeling chilled, hot and tired as well.  Has a little cough.  Has been using flexeril, but it makes her groggy, so she doesn't like to take it.  Helps her to sleep, but still has a hard time moving if she needs to get out of bed at night.  No injuries.  Was helping at a  a week ago and has been getting progressively worse since then.  Had fallen back in 2016 on her buttocks.  She didn't have any immediate pain, but has had a few flares of the pain since then.  Has had trouble trying to go to the bathroom, but hasn't been drinking enough either.  Her right leg seemed weak off and on.  No saddle anesthesia.    HPI  I personally reviewed medications/allergies/history listed below:      Allergies      Allergen   Reactions     Codeine  Nausea And Vomiting     Naprosyn [Naproxen]  Other - Describe In Comment Field     Mouth sores      Sulfa (Sulfonamide Antibiotics)  *Unknown - Childhood Rxn     Sulfacetamide Sodium  Rash     Had a reaction to sulfa drug but is not sure which one it was-states the reaction resulted in redness      Valium [Diazepam]  Hallucinations   ,   Family History       Problem   Relation Age of Onset     Diabetes  Father      Hypertension  Father      Heart Disease  Father      Other  Father        hearing loss, kidney problems.       Cancer-breast  Sister      Heart Disease  Mother      Hypertension  Mother      Cancer  Maternal Grandmother      bone cancer in her leg       Psychiatric illness  Maternal Grandmother      depression       Diabetes  Sister      Heart Disease  Sister      angioplasty CAD       Other  Sister      Right ear deafness - surgically repaired       Other  Sister      depression       Diabetes  Sister      Hypertension  Sister      Other  Sister      car accident age 75 with multiple fractures - in nursing home.       Heart Disease  Brother      Heart Disease  Brother      Hypertension  Brother      Other  Brother      vertigo, also kidney problems.       Heart Disease  Son      MI - first at age 48     ,   Current Outpatient Prescriptions on File Prior to Visit       Medication  Sig Dispense Refill     cod liver oil oil Take  by mouth.       CYANOCOBALAMIN, VITAMIN B-12, ORAL Take  by mouth.       cyclobenzaprine (FLEXERIL) 10 mg tablet Take 1 tablet by mouth 3 times daily. 30 tablet 0     flaxseed-omega3,6,9-fatty acid 1,200-540-132 mg cap Take 1 capsule by mouth 3 times daily.  0     glucosamine frank 2KCl-chondroit 500-400 mg tab Take  by mouth.       hydrochlorothiazide (HCTZ) 25 mg tablet Take 1 tablet by mouth once daily. 90 tablet 3     Lecithin 400 mg capsule Take 1 capsule by mouth 2 times daily.       multivitamin (MVI) tablet Take 1 tablet by mouth once daily.       omega-3 acid ethyl esters (LOVAZA;OMACOR) 1 gram capsule Take 1,000 mg by mouth.       potassium chloride (KLOR-CON 10) 10 mEq Controlled-Release tablet Take 1 tablet by mouth once daily with a meal. 90 tablet 3     Red Yeast Rice Extract 600 mg cap Take  by mouth.       triamcinolone (ARISTOCORT; KENALOG) 0.1 % cream Apply  topically to affected area(s) 2 times daily. 30 g 11     vitamin e 100 unit capsule Take  by mouth.       No current facility-administered medications on file prior to visit.    ,   Past  Medical History      Diagnosis   Date     Ascending aorta dilatation (HC)  3/23/2016     3.5-3.6 cm on CT 3/2016 - follow up in 1 year       Bunion, right foot       Carpal tunnel syndrome  2010     GERD (gastroesophageal reflux disease)  12/3/2012     History of pregnancy        3, Para 3 with three vaginal deliveries      Hyperlipidemia  10/4/2011     HYPERTENSION       Impingement syndrome of right shoulder  10/09/07     Uterine prolapse       preloader comment: selections available to preload differs from paper chart.   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     POSTMENOPAUSAL STATUS N95.1     UTERINE PROLAPSE N81.4     SUBLUXATION, JOINT M24.80     DEGENERATIVE DISC DISEASE, CERVICAL SPINE M50.30     BURSITIS, RIGHT HIP M76.899     CARPAL TUNNEL SYNDROME G56.00     ROTATOR CUFF SYNDROME, RIGHT M71.9, M67.919     OSTEOARTHRITIS OF FEET M19.079     PLANTAR FASCIITIS M72.2     SKIN LESIONS, MULTIPLE L98.9     HYPERLIPIDEMIA E78.5     METATARSALGIA M77.9     PRONATION FOOT OR ANKLE, ACQUIRED M21.869, M21.6X9     GERD (gastroesophageal reflux disease) K21.9     Right shoulder pain M25.511     Tendonitis of wrist, right M77.8     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  Tube and ovaries still in place.         Pelvic laparoscopy        for pelvic adhesion       Bunionectomy        right       Colonoscopy screening   2004      normal       Pelvic laparoscopy        vaginal vault prolapse - Dr. Mumtaz Marshall - anterior/posterior repair with bladder mesh.       Carpal tunnel release  Right      Dr. Méndez       Colonoscopy diagnostic   3/16/15      F/U        Social History     Social  History        Marital status:       Spouse name: N/A     Number of children:  N/A     Years of education:  N/A     Occupational History      Not on file.     Social History Main Topics          Smoking status:   Passive Smoke Exposure - Never Smoker      Smokeless tobacco:   Never Used       Comment:  was a smoker       Alcohol use   0.0 oz/week     0 Standard drinks or equivalent per week        Comment: 1 per month       Drug use:   No      Sexual activity:   Not Currently      Partners:  Male      Other Topics  Concern     Not on file      Social History Narrative     The patient is .  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  Wade-grandson born 2/23/87 who is the son of Ilya.                  REVIEW OF SYSTEMS:  Review of Systems   All other systems reviewed and are negative.      OBJECTIVE:  /92  Pulse 80  Temp 98.3  F (36.8  C) (Tympanic)  Breastfeeding? No    EXAM:   Physical Exam   Constitutional: She is well-developed, well-nourished, and in no distress.   HENT:   Head: Normocephalic.   Neck: Normal range of motion. Neck supple. No thyromegaly present.   Cardiovascular: Normal rate, regular rhythm and normal heart sounds.    No murmur heard.  Pulmonary/Chest: Effort normal and breath sounds normal. No respiratory distress. She has no wheezes. She has no rales.   Musculoskeletal: She exhibits no edema.   Pain over right SI joint and lower lumbar spinous processes.  No sciatic notch tenderness.  Normal strength with flexion/extension at the hips, knees, ankles.  She is able to bear weight on tiptoes and also on her heels.   Lymphadenopathy:     She has no cervical adenopathy.   Neurological:   DTRs are 2+ and symmetric at the knees and achilles.   Psychiatric: Affect normal.   PHQ Depression  Screen     Over the last 2 weeks, how often have you been bothered by any of the following problems?  Patient declined to answer.        I personally reviewed results with patient as listed below:     XR SPINE LUMBAR 3 VIEWS, XR SACROILIAC JOINT 3 VIEWS BILATERAL     HISTORY: Acute right-sided low back pain with right-sided sciatica.     TECHNIQUE: 3 views of the lumbosacral spine, 3 views of the sacrum.     COMPARISON: MR 06/23/2006.     FINDINGS:     Images of the lumbar spine demonstrate degenerative stepwise anterolisthesis of L3 on L4 and L4 on L5. There is multilevel disc height loss and low lumbar facet degeneration. Vertebral body heights are preserved.     Images of the sacrum demonstrate mild left and moderate to severe right sacroiliac degenerative changes. No acute pelvic ring fracture is seen.      IMPRESSION:      Multifocal degenerative changes as above.      Electronically Signed By: Maurilio Rodas on 3/3/2017 5:19 PM  XR SPINE LUMBAR 3 VIEWS, XR SACROILIAC JOINT 3 VIEWS BILATERAL     HISTORY: Acute right-sided low back pain with right-sided sciatica.     TECHNIQUE: 3 views of the lumbosacral spine, 3 views of the sacrum.     COMPARISON: MR 06/23/2006.     FINDINGS:     Images of the lumbar spine demonstrate degenerative stepwise anterolisthesis of L3 on L4 and L4 on L5. There is multilevel disc height loss and low lumbar facet degeneration. Vertebral body heights are preserved.     Images of the sacrum demonstrate mild left and moderate to severe right sacroiliac degenerative changes. No acute pelvic ring fracture is seen.      IMPRESSION:      Multifocal degenerative changes as above.      Electronically Signed By: Maurilio Rodas on 3/3/2017 5:19 PM    ASSESSMENT/PLAN:    ICD-10-CM    1. Acute right-sided low back pain with right-sided sciatica M54.41 XR SPINE LUMBAR 3 VIEWS      XR SACROILIAC JOINT 3 VIEWS BILATERAL      MR SPINE LUMBAR WO      lidocaine 5% (LIDODERM) 5 % patch   2. Chronic right SI  joint pain M53.3 XR SPINE LUMBAR 3 VIEWS     G89.29 XR SACROILIAC JOINT 3 VIEWS BILATERAL      lidocaine 5% (LIDODERM) 5 % patch        Plan:    1.  X-rays as above.  She wanted to try lidoderm patches.  Prescription given.  She had a steroid injection several years ago, which gave her a lot of relief.  Will obtain an updated MRI of her lumbar spine and consider ordering steroid injection based on results.  2.  She does have significant right SI joint degenerative joint disease. Could also consider steroid injection in her SI joint .  Brooke Castaneda MD ....................  3/3/2017   8:43 PM

## 2018-01-03 NOTE — TELEPHONE ENCOUNTER
Patient Information     Patient Name MRN Arlyn Winters [id number] Female 1950      Telephone Encounter by Michell Meyer at 3/14/2017  3:51 PM     Author:  Michell Meyer Service:  (none) Author Type:  (none)     Filed:  3/14/2017  3:51 PM Encounter Date:  3/14/2017 Status:  Signed     :  Michell Meyer            Faxed  Michell Meyer LPN ..........3/14/2017 3:51 PM

## 2018-01-03 NOTE — TELEPHONE ENCOUNTER
Patient Information     Patient Name MRN Arlyn Winters [id number] Female 1950      Telephone Encounter by Brooke Castaneda MD at 3/14/2017 12:41 PM     Author:  Brooke Castaneda MD Service:  (none) Author Type:  Physician     Filed:  3/14/2017 12:41 PM Encounter Date:  3/14/2017 Status:  Signed     :  Brooke Castaneda MD (Physician)            I'll do a prior authorization.  Brooke Castaneda MD ....................  3/14/2017   12:41 PM

## 2018-01-04 NOTE — PROGRESS NOTES
Patient Information     Patient Name MRN Sex Arlyn Ron [id number] Female 1950      Progress Notes by Brooke Castaneda MD at 3/23/2017  9:15 AM     Author:  Brooke Castaneda MD Service:  (none) Author Type:  Physician     Filed:  3/23/2017 11:29 AM Encounter Date:  3/23/2017 Status:  Signed     :  Brooke Castaneda MD (Physician)            SUBJECTIVE:    Arlyn Holder is a 66 y.o. female who presents for follow up of chronic medical issues.    Recently had a right SI joint injection last week, which has really helped the back pain she was having.  Still notices a little discomfort with turning in bed at night.    Has noticed numbness in her left 1-4 fingers.  Has had an EMG several years ago.  Only had surgery on the right hand, but has CTS in her left hand too.  Has pain at the base of her right cmc joint as well.    Wakes up at night with numbness in her left hand.  Has to get up out of bed and walk around shaking her hand for the sensation to return.      HPI  I personally reviewed medications/allergies/history listed below:      Allergies      Allergen   Reactions     Codeine  Nausea And Vomiting     Naprosyn [Naproxen]  Other - Describe In Comment Field     Mouth sores      Sulfa (Sulfonamide Antibiotics)  *Unknown - Childhood Rxn     Sulfacetamide Sodium  Rash     Had a reaction to sulfa drug but is not sure which one it was-states the reaction resulted in redness      Valium [Diazepam]  Hallucinations   ,   Family History       Problem   Relation Age of Onset     Diabetes  Father      Hypertension  Father      Heart Disease  Father      Other  Father       hearing loss, kidney problems.       Cancer-breast  Sister      Heart Disease  Mother      Hypertension  Mother      Cancer  Maternal Grandmother      bone cancer in her leg       Psychiatric illness  Maternal Grandmother      depression       Diabetes  Sister      Heart Disease  Sister      angioplasty CAD       Other   Sister      Right ear deafness - surgically repaired       Other  Sister      depression       Diabetes  Sister      Hypertension  Sister      Other  Sister      car accident age 73 with multiple fractures - in nursing home.       Heart Disease  Brother      Heart Disease  Brother      Hypertension  Brother      Other  Brother      vertigo, also kidney problems.       Heart Disease  Son      MI - first at age 48     ,   Current Outpatient Prescriptions on File Prior to Visit       Medication  Sig Dispense Refill     cod liver oil oil Take  by mouth.       Coenzyme Q10 10 mg cap Take 1 capsule by mouth once daily.       CYANOCOBALAMIN, VITAMIN B-12, ORAL Take  by mouth.       cyclobenzaprine (FLEXERIL) 10 mg tablet Take 1 tablet by mouth 3 times daily. 30 tablet 0     diclofenac 1 % topical (VOLTAREN) gel Apply  topically to affected area(s) 2 times daily. 100 g 11     flaxseed-omega3,6,9-fatty acid 1,200-540-132 mg cap Take 1 capsule by mouth 3 times daily.  0     glucosamine frank 2KCl-chondroit 500-400 mg tab Take  by mouth.       Lecithin 400 mg capsule Take 1 capsule by mouth 2 times daily.       lidocaine 5% (LIDODERM) 5 % patch Apply 1 patch to painful area of skin for up to 12 hours within a 24-hour period. 30 Patch 11     multivitamin (MVI) tablet Take 1 tablet by mouth once daily.       omega-3 acid ethyl esters (LOVAZA;OMACOR) 1 gram capsule Take 1,000 mg by mouth.       Red Yeast Rice Extract 600 mg cap Take  by mouth.       vitamin e 100 unit capsule Take  by mouth.       No current facility-administered medications on file prior to visit.    ,   Past Medical History      Diagnosis   Date     Ascending aorta dilatation (HC)  3/23/2016     3.5-3.6 cm on CT 3/2016 - follow up in 1 year       Eliecer, right foot       Carpal tunnel syndrome  2010     GERD (gastroesophageal reflux disease)  12/3/2012     History of pregnancy        3, Para 3 with three vaginal deliveries      Hyperlipidemia   10/4/2011     HYPERTENSION       Impingement syndrome of right shoulder  10/09/07     Uterine prolapse       preloader comment: selections available to preload differs from paper chart.   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     POSTMENOPAUSAL STATUS N95.1     UTERINE PROLAPSE N81.4     SUBLUXATION, JOINT M24.80     DEGENERATIVE DISC DISEASE, CERVICAL SPINE M50.30     BURSITIS, RIGHT HIP M76.899     CARPAL TUNNEL SYNDROME G56.00     ROTATOR CUFF SYNDROME, RIGHT M71.9, M67.919     OSTEOARTHRITIS OF FEET M19.079     PLANTAR FASCIITIS M72.2     SKIN LESIONS, MULTIPLE L98.9     HYPERLIPIDEMIA E78.5     METATARSALGIA M77.9     PRONATION FOOT OR ANKLE, ACQUIRED M21.869, M21.6X9     GERD (gastroesophageal reflux disease) K21.9     Right shoulder pain M25.511     Tendonitis of wrist, right M77.8     Arthritis of right wrist M19.90     Health care maintenance Z00.00     Ascending aorta dilatation (HC) I77.810    and   Past Surgical History       Procedure   Laterality Date     Vaginal hysterectomy   1982     for uterine prolapse with A&P repair.  Tube and ovaries still in place.         Pelvic laparoscopy   1989     for pelvic adhesion       Bunionectomy   1999     right       Colonoscopy screening   2004      normal       Pelvic laparoscopy   11/08     vaginal vault prolapse - Dr. Mumtaz Marshall - anterior/posterior repair with bladder mesh.       Carpal tunnel release  Right      Dr. Méndez       Colonoscopy diagnostic   3/16/15      F/U 2025       Social History     Social History        Marital status:       Spouse name: N/A     Number of children:  N/A     Years of education:  N/A     Occupational History      Not on file.     Social History Main Topics          Smoking status:   Passive Smoke Exposure - Never Smoker      Smokeless tobacco:   Never Used       Comment:  was a smoker       Alcohol use   0.0  "oz/week     0 Standard drinks or equivalent per week        Comment: 1 per month       Drug use:   No      Sexual activity:   Not Currently      Partners:  Male      Other Topics  Concern     Not on file      Social History Narrative     The patient is .  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  [employer] Holder    Children Ilya born 10/11/65, Gregorio born 7/7/68, Niranjan born 12/31/70.  Wade-grandson born 2/23/87 who is the son of Ilya.                  REVIEW OF SYSTEMS:  Review of Systems   All other systems reviewed and are negative.      OBJECTIVE:  /92  Temp 98.1  F (36.7  C) (Tympanic)  Ht 1.48 m (4' 10.27\")  Wt 67.2 kg (148 lb 3.2 oz)  BMI 30.69 kg/m2    EXAM:   Physical Exam   Constitutional: She is oriented to person, place, and time and well-developed, well-nourished, and in no distress.   HENT:   Head: Normocephalic and atraumatic.   Right Ear: External ear normal.   Left Ear: External ear normal.   Nose: Nose normal.   Mouth/Throat: Oropharynx is clear and moist.   Eyes: Pupils are equal, round, and reactive to light. No scleral icterus.   Neck: Normal range of motion. Neck supple. No thyromegaly present.   Cardiovascular: Normal rate, regular rhythm, normal heart sounds and intact distal pulses.  Exam reveals no gallop and no friction rub.    No murmur heard.  Pulmonary/Chest: Effort normal and breath sounds normal. No respiratory distress. She has no wheezes. She has no rales. She exhibits no tenderness.   Breast exam:  No masses palpable.  No skin changes, tethering or axillary lymphadenopathy.   Abdominal: Soft. Bowel sounds are normal.   Genitourinary: Vagina normal, right adnexa normal and left adnexa normal.   Genitourinary Comments: Pap deferred.  Uterus surgically absent.     Musculoskeletal: Normal range of motion. She exhibits no edema.   Tinel's and Phalen's positive on left.  No " reproduction of symptoms with palpation of ulnar groove.   Lymphadenopathy:     She has no cervical adenopathy.   Neurological: She is alert and oriented to person, place, and time. She has normal reflexes. No cranial nerve deficit.   Skin: Skin is warm and dry.   Psychiatric: Affect normal.   PHQ Depression Screen  Date of PHQ exam: 03/23/17  Over the last 2 weeks, how often have you been bothered by any of the following problems?  1. Little interest or pleasure in doing things: 0 - Not at all  2. Feeling down, depressed, or hopeless: 0 - Not at all                                             ASSESSMENT/PLAN:    ICD-10-CM    1. Essential hypertension I10 hydroCHLOROthiazide (HCTZ) 25 mg tablet      BASIC METABOLIC PANEL      BASIC METABOLIC PANEL   2. SI joint arthritis (HC) M46.98    3. Carpal tunnel syndrome, unspecified laterality G56.00 AMB CONSULT TO ORTHOPEDICS - AFFILIATE ONLY   4. Hypokalemia E87.6 potassium chloride (KLOR-CON 10) 10 mEq Controlled-Release tablet      BASIC METABOLIC PANEL      BASIC METABOLIC PANEL   5. Hyperlipidemia, unspecified hyperlipidemia type E78.5 LIPID PANEL      LIPID PANEL   6. Dermatitis L30.9 triamcinolone (ARISTOCORT; KENALOG) 0.1 % cream   7. Need for pneumococcal vaccination Z23 PREVNAR 13 (AKA PNEUMOCOCCAL VACCINE 13-VALENT IM)      IA ADMIN VACC INITIAL   8. Encounter for screening mammogram for high-risk patient Z12.31 XR MAMMO BILAT SCREENING   9. Need for Zostavax administration Z23 zoster vaccine live, PF, (ZOSTAVAX) 19,400 unit/0.65 mL injection   10. Health care maintenance Z00.00         Plan:    1.  Blood pressure is slightly high.  Has been approximately this level at home.  She doesn't want to add any other antihypertensives at this time.  2.  Improved pain after recent injection.  3.  Referred to Orthopedics to consider surgery.  4.  Check potassium as part of Basic Metabolic Panel.  Refill as above.  5.  Lipids as above.  6.  Triamcinolone refilled.  7.   Prevnar updated today.  8.  Mammogram ordered.  9.  Prescription given to obtain zostavax at outside pharmacy.  10.  Mammogram as above.  Pap smear deferred due to S/P hysterectomy and age >65.  Colonoscopy is up to date.  DEXA last completed 2013 and was normal.  Vaccines as above.  Brooke Castaneda MD ....................  3/23/2017   11:28 AM

## 2018-01-04 NOTE — PROGRESS NOTES
Patient Information     Patient Name MRN Sex     Arlyn Llamas [id number] Female 1950      Progress Notes signed by Kevin Antony MD at 2017 11:56 AM      Author:  Kevin Antony MD Service:  (none) Author Type:  Physician     Filed:  2017 11:56 AM Encounter Date:  2017 Status:  Signed     :  Kevin Antony MD (Physician)            -  2017        [address] [address]    RE:  ARLYN LLAMAS  MR#:  [id number]  :  1950    Dear Ms. Llamas:    I am responding to the note that your left when you were in with your  last week. I have reviewed all the findings that you listed on your letter.     I reviewed the neuropsychiatric evaluation that was done on  by Dr. Ash.  This evaluation indicates some cognitive dysfunction, in other words, some problems with certain types of cognitive thinking in terms of calculations, etc., but no evidence of true dementia. There may be some very slight dementia based on the stroke, but the cognitive dysfunction found by Dr. Ash was felt to be directly related to the stroke.     The outbursts that your  suffers from are likely due to effects of the stroke itself but not necessarily dementia. Oftentimes, these things do respond to certain types of antianxiety or antidepressant medication. I have sent him a letter to follow up on some lab work and a chest x-ray. When he comes in, we can discuss it at that time and institute what would be appropriate therapy.     Thank you for your input, it is quite helpful and hopefully, I can be helpful to you and your  in terms of controlling some of this behavior.      Sincerely yours,    KEVIN ANTONY MD    WR/rg  Voice Job ID: [id number]  Text Job ID:  [id number]    cc:  2017  RE:  ARLYN LLAMAS  MR#:  [id number]  Page 1

## 2018-01-04 NOTE — TELEPHONE ENCOUNTER
Patient Information     Patient Name MRN Arlyn Winters [id number] Female 1950      Telephone Encounter by Elva Lazcano at 2017  2:02 PM     Author:  Elva Lazcano Service:  (none) Author Type:  (none)     Filed:  2017  2:04 PM Encounter Date:  2017 Status:  Signed     :  Elva Lazcano            Patient received letter dated 3/26/17 in which Brooke Castaneda MD recommended she start taking a statin medication.  Patient is interested in doing so.    Elva Lazcano LPN........................2017  2:04 PM

## 2018-01-04 NOTE — TELEPHONE ENCOUNTER
Patient Information     Patient Name MRN Arlyn Winters [id number] Female 1950      Telephone Encounter by Ami Grullon at 2017  3:48 PM     Author:  Ami Grullon Service:  (none) Author Type:  (none)     Filed:  2017  3:50 PM Encounter Date:  2017 Status:  Signed     :  Ami Grlulon            Left message for the patient that a prescription was sent in and she will need to have fasting labs done 4-6 weeks after starting it.  Asked her to call back if she has any questions.  Ami Grullon CMA (AAMA)................ 2017 3:50 PM

## 2018-01-04 NOTE — TELEPHONE ENCOUNTER
Patient Information     Patient Name MRN Arlyn Winters [id number] Female 1950      Telephone Encounter by Brooke Castaneda MD at 2017  3:38 PM     Author:  Brooke Castaneda MD Service:  (none) Author Type:  Physician     Filed:  2017  3:38 PM Encounter Date:  2017 Status:  Signed     :  Brooke Castaneda MD (Physician)            Start lipitor 20 mg daily.  Prescription sent to pharmacy for her.  Lipids, AST & ALT should be rechecked in 4-6 weeks.  Brooke Castaneda MD ....................  2017   3:38 PM

## 2018-01-04 NOTE — TELEPHONE ENCOUNTER
Patient Information     Patient Name MRN Arlyn Winters [id number] Female 1950      Telephone Encounter by Ami Grullon at 2017  3:43 PM     Author:  Ami Grullon Service:  (none) Author Type:  (none)     Filed:  2017  3:43 PM Encounter Date:  2017 Status:  Signed     :  Ami Grullon.  Ami Grullon [facility] (AAMA)................ 2017 3:43 PM

## 2018-01-04 NOTE — PROGRESS NOTES
Patient Information     Patient Name MRN Sex Arlyn Ron [id number] Female 1950      Progress Notes by Milena Major at 3/30/2017 10:50 AM     Author:  Milena Major Service:  (none) Author Type:  (none)     Filed:  3/30/2017 10:50 AM Date of Service:  3/30/2017 10:50 AM Status:  Signed     :  Milena Major            Falls Risk Criteria:    Age 65 and older or under age 4        Sensory deficits    Poor vision    Use of ambulatory aides    Impaired judgment    Unable to walk independently    Meets High Risk criteria for falls:  no

## 2018-01-26 ENCOUNTER — AMBULATORY - GICH (OUTPATIENT)
Dept: ORTHOPEDICS | Facility: OTHER | Age: 68
End: 2018-01-26

## 2018-01-26 VITALS
WEIGHT: 144.4 LBS | HEART RATE: 68 BPM | BODY MASS INDEX: 30.31 KG/M2 | DIASTOLIC BLOOD PRESSURE: 98 MMHG | SYSTOLIC BLOOD PRESSURE: 134 MMHG | OXYGEN SATURATION: 96 % | HEIGHT: 58 IN | TEMPERATURE: 98.1 F

## 2018-01-26 VITALS
DIASTOLIC BLOOD PRESSURE: 92 MMHG | BODY MASS INDEX: 31.11 KG/M2 | HEIGHT: 58 IN | SYSTOLIC BLOOD PRESSURE: 142 MMHG | WEIGHT: 148.2 LBS | TEMPERATURE: 98.1 F

## 2018-01-26 VITALS — HEART RATE: 80 BPM | TEMPERATURE: 98.3 F | SYSTOLIC BLOOD PRESSURE: 146 MMHG | DIASTOLIC BLOOD PRESSURE: 92 MMHG

## 2018-01-26 DIAGNOSIS — G89.29 OTHER CHRONIC PAIN: ICD-10-CM

## 2018-01-26 DIAGNOSIS — M25.561 PAIN IN RIGHT KNEE: ICD-10-CM

## 2018-01-30 ENCOUNTER — OFFICE VISIT - GICH (OUTPATIENT)
Dept: ORTHOPEDICS | Facility: OTHER | Age: 68
End: 2018-01-30

## 2018-01-30 ENCOUNTER — HISTORY (OUTPATIENT)
Dept: ORTHOPEDICS | Facility: OTHER | Age: 68
End: 2018-01-30

## 2018-01-30 ENCOUNTER — HOSPITAL ENCOUNTER (OUTPATIENT)
Dept: RADIOLOGY | Facility: OTHER | Age: 68
End: 2018-01-30
Attending: ORTHOPAEDIC SURGERY

## 2018-01-30 DIAGNOSIS — G89.29 OTHER CHRONIC PAIN: ICD-10-CM

## 2018-01-30 DIAGNOSIS — M25.561 PAIN IN RIGHT KNEE: ICD-10-CM

## 2018-01-30 DIAGNOSIS — M17.12 PRIMARY OSTEOARTHRITIS OF LEFT KNEE: ICD-10-CM

## 2018-01-30 DIAGNOSIS — M25.562 PAIN IN LEFT KNEE: ICD-10-CM

## 2018-02-09 VITALS
SYSTOLIC BLOOD PRESSURE: 138 MMHG | BODY MASS INDEX: 30.23 KG/M2 | WEIGHT: 144 LBS | HEART RATE: 72 BPM | HEIGHT: 58 IN | DIASTOLIC BLOOD PRESSURE: 76 MMHG

## 2018-02-13 NOTE — PROGRESS NOTES
Patient Information     Patient Name MRN Sex Arlyn Ron [id number] Female 1950      Progress Notes by Jesse Lowry DO at 2018  9:45 AM     Author:  Jesse Lowry DO Service:  (none) Author Type:  PHYS- Osteopathic     Filed:  2018 10:54 AM Encounter Date:  2018 Status:  Signed     :  Jesse Lowry DO (PHYS- Osteopathic)            Arlyn Holder was seen for a chief complaint of left knee pain.    CHIEF COMPLAINT: Arlyn Holder is a 67 y.o.  female  Chief Complaint     Patient presents with       Consult      left knee pain       HISTORY OF PRESENTING INJURY:  67-year-old female relates she woke up from bed on 2018 and experienced left knee pain which has persisted. Describes generalized pain around the back of the knee. Also around the front and sides. Worse when pressing on the tissue. Notices some associated swelling of the knee. Pain also extends down into the shin region. Symptoms usually worse with bending, movement or weightbearing. Increased discomfort when trying to flex the knee.  No history of trauma.  Treatment has been limited. Mostly ice and elevation. Reluctant to take over-the-counter medication. She does not use a cane.  No history of significant prior knee problems except when she was much younger and in gymnastics.  Right knee is comfortable. No complaint of hip pain. Notices she walks with a limp.  No complaint of redness or bruising.    REVIEW OF SYSTEMS:  Constitutional:  Denies constitutional problems  Cardiovascular: normal  Respiratory: normal    The review of systems as documented in the HPI and on the intake questionnaire, completed by the patient 2018, have been reviewed by myself and the pertinent positives and negatives addressed.  The remainder of the complete review of systems was non-contributory.    (PFSH) PAST, FAMILY, and/or SOCIAL HISTORY:    PAST MEDICAL HISTORY:  Past Medical History:     Diagnosis  Date      Ascending aorta dilatation (HC) 3/23/2016    3.5-3.6 cm on CT 3/2016 - follow up in 1 year       Eliecer, right foot      Carpal tunnel syndrome 2010     GERD (gastroesophageal reflux disease) 12/3/2012     History of pregnancy      3, Para 3 with three vaginal deliveries      Hyperlipidemia 10/4/2011     HYPERTENSION      Impingement syndrome of right shoulder 10/09/07     Uterine prolapse     preloader comment: selections available to preload differs from paper chart.   *Pelvic floor relaxation         PAST SURGICAL HISTORY:  Past Surgical History:      Procedure  Laterality Date     BUNIONECTOMY      right       CARPAL TUNNEL RELEASE Right     Dr. Méndez       COLONOSCOPY DIAGNOSTIC  3/16/15     F/U        COLONOSCOPY SCREENING       normal       PELVIC LAPAROSCOPY      for pelvic adhesion       PELVIC LAPAROSCOPY      vaginal vault prolapse - Dr. Mumtza Marshall - anterior/posterior repair with bladder mesh.       VAGINAL HYSTERECTOMY      for uterine prolapse with A&P repair.  Tube and ovaries still in place.           ALLERGIES:  Allergies      Allergen   Reactions     Codeine  Nausea And Vomiting     Naprosyn [Naproxen]  Other - Describe In Comment Field     Mouth sores      Sulfa (Sulfonamide Antibiotics)  *Unknown - Childhood Rxn     Sulfacetamide Sodium  Rash     Had a reaction to sulfa drug but is not sure which one it was-states the reaction resulted in redness      Valium [Diazepam]  Hallucinations       CURRENT MEDICATIONS:  Current Outpatient Prescriptions       Medication  Sig Dispense Refill     atorvastatin (LIPITOR) 20 mg tablet Take 1 tablet by mouth once daily. 90 tablet 3     cod liver oil oil Take  by mouth.       Coenzyme Q10 10 mg cap Take 1 capsule by mouth once daily.       CYANOCOBALAMIN, VITAMIN B-12, ORAL Take  by mouth.       cyclobenzaprine (FLEXERIL) 10 mg tablet Take 1 tablet by mouth 3 times daily. 30 tablet 0     diclofenac 1 % topical  (VOLTAREN) gel Apply  topically to affected area(s) 2 times daily. 100 g 11     flaxseed-omega3,6,9-fatty acid 1,200-540-132 mg cap Take 1 capsule by mouth 3 times daily.  0     glucosamine frank 2KCl-chondroit 500-400 mg tab Take  by mouth.       hydroCHLOROthiazide (HCTZ) 25 mg tablet Take 1 tablet by mouth once daily. 90 tablet 3     Lecithin 400 mg capsule Take 1 capsule by mouth 2 times daily.       omega-3 acid ethyl esters (LOVAZA;OMACOR) 1 gram capsule Take 1,000 mg by mouth.       potassium chloride (K-DUR) 20 mEq Extended-Release tablet Take 1 tablet by mouth once daily with a meal. 90 tablet 3     Red Yeast Rice Extract 600 mg cap Take  by mouth.       triamcinolone (ARISTOCORT; KENALOG) 0.1 % cream Apply  topically to affected area(s) 2 times daily. 30 g 11     vitamin e 100 unit capsule Take  by mouth.       No current facility-administered medications for this visit.      Medications have been reviewed by me and are current to the best of my knowledge and ability.      FAMILY HISTORY:  Family History       Problem   Relation Age of Onset     Diabetes  Father      Hypertension  Father      Heart Disease  Father      Other  Father       hearing loss, kidney problems.       Cancer-breast  Sister      Heart Disease  Mother      Hypertension  Mother      Cancer  Maternal Grandmother      bone cancer in her leg       Psychiatric illness  Maternal Grandmother      depression       Diabetes  Sister      Heart Disease  Sister      angioplasty CAD       Other  Sister      Right ear deafness - surgically repaired       Other  Sister      depression       Diabetes  Sister      Hypertension  Sister      Other  Sister      car accident age 75 with multiple fractures - in nursing home.       Heart Disease  Brother      Heart Disease  Brother      Hypertension  Brother      Other  Brother      vertigo, also kidney problems.       Heart Disease  Son      MI - first at age 48         Additional [facility] information documented  "on the intake form completed by the patient 1/30/2018 was reviewed by myself.    PHYSICAL EXAM:   /76 (Cuff Site: Right Arm, Position: Sitting, Cuff Size: Adult Regular)  Pulse 72  Ht 1.478 m (4' 10.17\")  Wt 65.3 kg (144 lb)  BMI 29.92 kg/m2 Body mass index is 29.92 kg/(m^2).    General Appearance: Pleasant female in good appearance, mood and affect.  Alert and orientated times three ( time, date and location).    Examination of Left knee:    Skin: Normal.    Sensation is Normal  Alignment: Neutral  Effusion: Plus minus  Passive extension: Full passive extension but the patient prefers to keep the knee flexed about 10 .   Passive flexion: 120 . Increased discomfort at extreme of motion.  Patella tracks:  without an inverted J sign  Varus stress testing: is stable  Valgus stress testing: is stable  Anterior drawer test: is stable  Posterior drawer test: is stable  Lachman's test: is stable  palpation to the knee joint and any location causes discomfort. Palpation in the suprapatellar area and along the medial and lateral side also causes discomfort.     Hip: Bilateral  comfortable hip motion    negative pain with log roll testing    Calf:  negative tenderness    Neurological / Vascular Examination:  Lower extremity edema present: Mild, bilateral with discomfort on exam.  Sensation: Normal  Distal pulses: present    Xray/MRI/MRA:  Radiographic images where independently reviewed and discussed with the patient.   Attending Doctor: MOIZ BELL ([id number])  :       AIDA HAQ ([id number])  Report Date:       01/30/2018 09:58:12  Report Status:       Final  ======================= Begin of Report Content ======================    PROCEDURE: XR KNEE 3 VIEWS AP LAT SUNRISE LEFT  HISTORY: Chronic pain of right knee.  COMPARISON: 02/05/2010  TECHNIQUE: 3 views left knee.  FINDINGS: No acute fracture is identified. There is mild medial joint space narrowing. There is mild medial lateral compartment " osteophytosis. There is mild to moderate patellofemoral osteophytosis most involving the lateral patellofemoral compartment.  IMPRESSION: Progressive osteoarthritis of the left knee.  Electronically Signed By: Maurilio Rodas on 1/30/2018 9:54 AM    IMPRESSION:  Left knee pain, onset about 3 weeks ago. No history of trauma  left knee osteoarthritis including tibiofemoral and patellofemoral.  Suspect patient's symptoms are related to underlying arthritic findings. Ligaments stable.    PLAN:  Discussion included review of x-rays.  Recommendations to consider over-the-counter medication such as Tylenol or ibuprofen. Caution with use.  Continue ice to the affected area. Discussed the use of a cane.  Recommend cortisone injection to the left knee today. Patient agrees.    Risks, benefits, conservative, surgical, and alternatives of treatment were thoroughly outlined. No guarantees were given.  She did verbalize an understanding. All questions and concerns were addressed.    PROCEDURE:  A written and oral informed consent was received from the patient. Risks and benefits were discussed including but not limited to infection and the possibility of continued symptoms even after injection. The patients left knee was sterilely prepped and draped after a timeout was performed. Utilizing ethyl chloride the area was cooled. I localized the site with 4 mL Xylocaine 1% plain.  With sterile technique a 22 gauge needle was introduced and approximately 1 cc of clear fluid was aspirated and then 6 cc of lidocaine and 2 cc of Celestone was injected.  A sterile bandage was applied and the patient tolerated the procedure well.  They where given a handout about injections to take home.   Apply ice to the knee as needed.  Questions answered and at this time the patient will follow-up as needed.    Jesse Lowry D.O., F.A.O.A.O.  Orthopedic Surgeon    [facility]  [address] [place] [address]  Phone  [phone]  Fax [phone]    10:13 AM 1/30/2018

## 2018-02-13 NOTE — NURSING NOTE
Patient Information     Patient Name MRN Arlyn Winters [id number] Female 1950      Nursing Note by Vanessa Orr at 2018  9:45 AM     Author:  Vanessa Orr Service:  (none) Author Type:  (none)     Filed:  2018  9:37 AM Encounter Date:  2018 Status:  Signed     :  Vanessa Orr            Patient is here for a consult on her left knee pain.  Vanessa Orr LPN .......2018 9:36 AM

## 2018-02-15 ENCOUNTER — DOCUMENTATION ONLY (OUTPATIENT)
Dept: FAMILY MEDICINE | Facility: OTHER | Age: 68
End: 2018-02-15

## 2018-02-15 PROBLEM — I10 HYPERTENSION: Status: ACTIVE | Noted: 2018-02-15

## 2018-02-15 PROBLEM — M77.50 ENTHESOPATHY OF ANKLE AND TARSUS: Status: ACTIVE | Noted: 2018-02-15

## 2018-02-15 PROBLEM — N81.4 UTERINE PROLAPSE: Status: ACTIVE | Noted: 2018-02-15

## 2018-02-15 PROBLEM — M24.80: Status: ACTIVE | Noted: 2018-02-15

## 2018-02-15 PROBLEM — N95.1 SYMPTOMATIC MENOPAUSAL OR FEMALE CLIMACTERIC STATES: Status: ACTIVE | Noted: 2018-02-15

## 2018-02-15 PROBLEM — M50.30 DEGENERATION OF CERVICAL INTERVERTEBRAL DISC: Status: ACTIVE | Noted: 2018-02-15

## 2018-02-15 PROBLEM — M76.899 ENTHESOPATHY OF HIP REGION: Status: ACTIVE | Noted: 2018-02-15

## 2018-02-15 PROBLEM — M21.969 ACQUIRED DEFORMITY OF ANKLE AND FOOT: Status: ACTIVE | Noted: 2018-02-15

## 2018-02-15 PROBLEM — M22.40 CHONDROMALACIA OF PATELLA: Status: ACTIVE | Noted: 2018-02-15

## 2018-02-15 RX ORDER — COD LIVER OIL
OIL (ML) ORAL
COMMUNITY
End: 2020-09-03

## 2018-02-15 RX ORDER — TRIAMCINOLONE ACETONIDE 1 MG/G
CREAM TOPICAL 2 TIMES DAILY
COMMUNITY
Start: 2017-03-23 | End: 2020-09-03

## 2018-02-15 RX ORDER — CYCLOBENZAPRINE HCL 10 MG
10 TABLET ORAL 3 TIMES DAILY
COMMUNITY
Start: 2016-03-09 | End: 2018-02-27

## 2018-02-15 RX ORDER — HYDROCHLOROTHIAZIDE 25 MG/1
25 TABLET ORAL DAILY
COMMUNITY
Start: 2017-03-23 | End: 2018-03-29

## 2018-02-15 RX ORDER — CALCIUM CARBONATE 750 MG/1
1 TABLET, CHEWABLE ORAL 3 TIMES DAILY
COMMUNITY
End: 2019-11-14

## 2018-02-15 RX ORDER — CYANOCOBALAMIN (VITAMIN B-12) 1000 MCG
TABLET, EXTENDED RELEASE ORAL
COMMUNITY

## 2018-02-15 RX ORDER — ATORVASTATIN CALCIUM 20 MG/1
20 TABLET, FILM COATED ORAL DAILY
COMMUNITY
Start: 2017-04-07 | End: 2018-03-29

## 2018-02-15 RX ORDER — OMEGA-3-ACID ETHYL ESTERS 1 G/1
1000 CAPSULE, LIQUID FILLED ORAL
COMMUNITY
End: 2020-09-03

## 2018-02-15 RX ORDER — POTASSIUM CHLORIDE 1500 MG/1
20 TABLET, EXTENDED RELEASE ORAL
COMMUNITY
Start: 2017-06-16 | End: 2018-03-29

## 2018-02-15 RX ORDER — CRANBERRY FRUIT EXTRACT 200 MG
CAPSULE ORAL
COMMUNITY
End: 2018-02-27

## 2018-02-15 RX ORDER — RIBOFLAVIN (VITAMIN B2) 100 MG
TABLET ORAL
COMMUNITY
Start: 2007-03-27 | End: 2018-02-27

## 2018-02-16 ENCOUNTER — OFFICE VISIT (OUTPATIENT)
Dept: ORTHOPEDICS | Facility: OTHER | Age: 68
End: 2018-02-16
Attending: ORTHOPAEDIC SURGERY
Payer: COMMERCIAL

## 2018-02-16 VITALS
WEIGHT: 144 LBS | HEART RATE: 84 BPM | DIASTOLIC BLOOD PRESSURE: 100 MMHG | SYSTOLIC BLOOD PRESSURE: 142 MMHG | BODY MASS INDEX: 29.92 KG/M2 | TEMPERATURE: 97.8 F

## 2018-02-16 DIAGNOSIS — M17.12 PRIMARY OSTEOARTHRITIS OF LEFT KNEE: Primary | ICD-10-CM

## 2018-02-16 DIAGNOSIS — M89.8X6 PAIN IN LEFT TIBIA: ICD-10-CM

## 2018-02-16 PROCEDURE — G0463 HOSPITAL OUTPT CLINIC VISIT: HCPCS

## 2018-02-16 PROCEDURE — 99213 OFFICE O/P EST LOW 20 MIN: CPT | Performed by: ORTHOPAEDIC SURGERY

## 2018-02-16 ASSESSMENT — PAIN SCALES - GENERAL: PAINLEVEL: SEVERE PAIN (6)

## 2018-02-16 NOTE — NURSING NOTE
Pt is here today for follow up.  Had left knee injection on 1/30/18, states leg is painful and swollen  Brianna Valenzuela

## 2018-02-16 NOTE — PROGRESS NOTES
PROGRESS NOTE    SUBJECTIVE:  Arlyn Holder is here for recheck of a left knee.     HPI: 67-year-old female had a left knee cortisone injection about 2+ weeks ago on January 30.  She relates the shot seem to be helping.  Yesterday after activity she noticed left knee and leg pain with associated swelling.  She relates the whole leg was painful from the hip down towards the ankle.  The knee was also painful and has continued mostly along the medial side below the knee joint.  Some pain behind the knee as well.  Worse with weightbearing activities or bending.  Touching the area also increases pain.  No recent trauma.  Treatment included Tylenol.    Review of Systems:  Constitutional: Denies constitutional problems    PFSH:  No change in information. See earlier PFSH questionnaire completed by the patient on initial visit.    OBJECTIVE:  BP (!) 142/100 (BP Location: Right arm, Patient Position: Sitting, Cuff Size: Adult Regular)  Pulse 84  Temp 97.8  F (36.6  C) (Oral)  Wt 65.3 kg (144 lb)  BMI 29.92 kg/m2Body mass index is 29.92 kg/(m^2).  Patient is alert and orientated x3 and answers questions appropriately.    Left knee exam:   The left leg demonstrates no bruising or redness.  She is able to get on the examination table but demonstrates discomfort to the left leg and knee area.  She has some mild to moderate soft tissue swelling around the left knee compared to the right especially below the knee joint on the medial proximal tibia.  The medial proximal tibia is very tender with touch of the skin and deep palpation elicits pain over the Pes anserines area  And proximal medial tibia.  This reproduces her symptoms.  The knee is stable with varus and valgus stress.  Mild crepitus and discomfort with active or passive left knee motion today.  The left calf is supple.  Nontender.    Radiographic images were independently reviewed and discussed with the patient.   X RAY:  PROCEDURE: XR KNEE 3 VIEWS AP LAT SUNRISE  LEFT    HISTORY: Chronic pain of right knee.    COMPARISON: 02/05/2010    TECHNIQUE: 3 views left knee.    FINDINGS: No acute fracture is identified. There is mild medial joint space narrowing. There is mild medial lateral compartment osteophytosis. There is mild to moderate patellofemoral osteophytosis most involving the lateral patellofemoral compartment.    IMPRESSION:  Progressive osteoarthritis of the left knee.    Electronically Signed By: Maurilio Rodas on 1/30/2018 9:54 AM  ASSESSMENT:  Left knee and leg pain  Left knee osteoarthritis  Left knee pes anserinus bursitis/tendinitis.  Possibility of a stress fracture to the proximal tibia was discussed.      PLAN:  Recommendations include continued use of ice to the affected area.  Cane for taking weight off the knee.  Discussed use of over-the-counter arthritis type medication.  Recommend cortisone injection to the soft tissue along the proximal medial aspect of the knee/bursa tendon area.  The patient declined.  Recommend further evaluation of the left knee and proximal tibia with MRI to assess for possible stress fracture of the proximal tibia.    Anticipate a follow-up after study.  Questions answered.    Jesse Lowry D.O.  Orthopedic Surgeon    [facility]  [address] [place] [address]  Phone [phone]  Fax [phone]    2/16/2018

## 2018-02-16 NOTE — MR AVS SNAPSHOT
After Visit Summary   2/16/2018    Arlyn Holder    MRN: [id number]           Patient Information     Date Of Birth          1950        Visit Information        Provider Department      2/16/2018 9:00 AM Jesse Lowry DO [facility]        Today's Diagnoses     Primary osteoarthritis of left knee    -  1    Pain in left tibia           Follow-ups after your visit        Follow-up notes from your care team     Return in about 1 week (around 2/23/2018).      Your next 10 appointments already scheduled     Feb 22, 2018 12:45 PM CST   (Arrive by 12:30 PM)   MR KNEE LEFT W/O CONTRAST with [facility]   [facility] ([facility])    [address]  [place] [address] [phone]           Take your medicines as usual, unless your doctor tells you not to. Bring a list of your current medicines to your exam (including vitamins, minerals and over-the-counter drugs). Also bring the results of similar scans you may have had.  Please remove any body piercings and hair extensions before you arrive.  Follow your doctor s orders. If you do not, we may have to postpone your exam.  You may or may not receive IV contrast for this exam pending the discretion of the Radiologist.  You do not need to do anything special to prepare.  The MRI machine uses a strong magnet. Please wear clothes without metal (snaps, zippers). A sweatsuit works well, or we may give you a hospital gown.   **IMPORTANT** THE INSTRUCTIONS BELOW ARE ONLY FOR THOSE PATIENTS WHO HAVE BEEN PRESCRIBED SEDATION OR GENERAL ANESTHESIA DURING THEIR MRI PROCEDURE:  IF YOUR DOCTOR PRESCRIBED ORAL SEDATION (take medicine to help you relax during your exam):   You must get the medicine from your doctor (oral medication) before you arrive. Bring the medicine to the exam. Do not take it at home. You ll be told when to take it upon arriving for your exam.   Arrive one hour early. Bring  someone who can take you home after the test. Your medicine will make you sleepy. After the exam, you may not drive, take a bus or take a taxi by yourself.  IF YOUR DOCTOR PRESCRIBED IV SEDATION:   Arrive one hour early. Bring someone who can take you home after the test. Your medicine will make you sleepy. After the exam, you may not drive, take a bus or take a taxi by yourself.   No eating 6 hours before your exam. You may have clear liquids up until 4 hours before your exam. (Clear liquids include water, clear tea, black coffee and fruit juice without pulp.)  IF YOUR DOCTOR PRESCRIBED ANESTHESIA (be asleep for your exam):   Arrive 1 1/2 hours early. Bring someone who can take you home after the test. You may not drive, take a bus or take a taxi by yourself.   No eating 8 hours before your exam. You may have clear liquids up until 4 hours before your exam. (Clear liquids include water, clear tea, black coffee and fruit juice without pulp.)   You will spend four to five hours in the recovery room.  Please call the Imaging Department at your exam site with any questions.            Feb 22, 2018  2:30 PM CST   Office Visit with Brooke Castaneda MD   [facility]    [address]  [place] [id number] [phone]           Bring a current list of meds and any records pertaining to this visit. For Physicals, please bring immunization records and any forms needing to be filled out. Please arrive 10 minutes early to complete paperwork.              Future tests that were ordered for you today     Open Future Orders        Priority Expected Expires Ordered    MR Knee Left w/o Contrast Routine  2/16/2019 2/16/2018            Who to contact     If you have questions or need follow up information about today's clinic visit or your schedule please contact [facility] directly at [phone].  Normal or non-critical lab and  "imaging results will be communicated to you by MyChart, letter or phone within 4 business days after the clinic has received the results. If you do not hear from us within 7 days, please contact the clinic through [employer] or phone. If you have a critical or abnormal lab result, we will notify you by phone as soon as possible.  Submit refill requests through [employer] or call your pharmacy and they will forward the refill request to us. Please allow 3 business days for your refill to be completed.          Additional Information About Your Visit        [employer] Information     [employer] lets you send messages to your doctor, view your test results, renew your prescriptions, schedule appointments and more. To sign up, go to [email or web address] . Click on \"Log in\" on the left side of the screen, which will take you to the Welcome page. Then click on \"Sign up Now\" on the right side of the page.     You will be asked to enter the access code listed below, as well as some personal information. Please follow the directions to create your username and password.     Your access code is: [email or web address]  Expires: 2018 11:53 AM     Your access code will  in 90 days. If you need help or a new code, please call your [place] clinic or [phone].        Care EveryWhere ID     This is your Care EveryWhere ID. This could be used by other organizations to access your [place] medical records  [id number]        Your Vitals Were     Pulse Temperature BMI (Body Mass Index)             84 97.8  F (36.6  C) (Oral) 29.92 kg/m2          Blood Pressure from Last 3 Encounters:   18 (!) 142/100   18 138/76   17 (!) 134/98    Weight from Last 3 Encounters:   18 65.3 kg (144 lb)   18 65.3 kg (144 lb)   17 65.5 kg (144 lb 6.4 oz)               Primary Care Provider Office Phone # Fax #    Brooke Anastasiya Castaneda [phone] [address] [facility] [address]      "   Equal Access to Services     [facility] Hospital[facility] : Hadii aad ku hadsamycarlos Marlacarmen, wagillianda luqadaha, qaybta kaalmaemmanuel garcia, aruna marcos. So [facility] [phone].    ATENCIÓN: Si habla español, tiene a frank disposición servicios gratuitos de asistencia lingüística. Llame al [phone].    We comply with applicable federal civil rights laws and [place] laws. We do not discriminate on the basis of race, color, national origin, age, disability, sex, sexual orientation, or gender identity.            Thank you!     Thank you for choosing [facility]  for your care. Our goal is always to provide you with excellent care. Hearing back from our patients is one way we can continue to improve our services. Please take a few minutes to complete the written survey that you may receive in the mail after your visit with us. Thank you!             Your Updated Medication List - Protect others around you: Learn how to safely use, store and throw away your medicines at www.disposemymeds.org.          This list is accurate as of 2/16/18 11:53 AM.  Always use your most recent med list.                   Brand Name Dispense Instructions for use Diagnosis    atorvastatin 20 MG tablet    LIPITOR     Take 20 mg by mouth daily        Cod Liver Oil 5000-500 UNIT/5ML Oil           coenzyme Q-10 10 MG Caps      Take 10 mg by mouth daily        cyclobenzaprine 10 MG tablet    FLEXERIL     Take 10 mg by mouth 3 times daily        diclofenac 1 % Gel topical gel    VOLTAREN     Apply topically 2 times daily        EQL FLAXSEED OIL 1200 MG Caps      Take 1 capsule by mouth 3 times daily        glucosamine-chondroitinoitin 500-400 MG Tabs           hydrochlorothiazide 25 MG tablet    HYDRODIURIL     Take 25 mg by mouth daily        lecithin 400 MG Caps      Take 1 capsule by mouth 2 times daily        omega-3 acid ethyl esters 1 G capsule    Lovaza     Take 1,000 mg by mouth        Potassium Chloride ER  20 MEQ Tbcr      Take 20 mEq by mouth daily with food        Red Yeast Rice Extract 600 MG Caps           triamcinolone 0.1 % cream    KENALOG     Apply topically 2 times daily        Vitamin B-12 CR 1000 MCG Tbcr           vitamin E 100 UNIT capsule    TOCOPHEROL

## 2018-02-20 ENCOUNTER — DOCUMENTATION ONLY (OUTPATIENT)
Dept: FAMILY MEDICINE | Facility: OTHER | Age: 68
End: 2018-02-20

## 2018-02-22 ENCOUNTER — OFFICE VISIT (OUTPATIENT)
Dept: FAMILY MEDICINE | Facility: OTHER | Age: 68
End: 2018-02-22
Attending: FAMILY MEDICINE
Payer: MEDICARE

## 2018-02-22 ENCOUNTER — HOSPITAL ENCOUNTER (OUTPATIENT)
Dept: MRI IMAGING | Facility: OTHER | Age: 68
Discharge: HOME | End: 2018-02-22
Attending: ORTHOPAEDIC SURGERY | Admitting: ORTHOPAEDIC SURGERY
Payer: MEDICARE

## 2018-02-22 ENCOUNTER — TELEPHONE (OUTPATIENT)
Dept: MEDSURG UNIT | Facility: OTHER | Age: 68
End: 2018-02-22

## 2018-02-22 VITALS
SYSTOLIC BLOOD PRESSURE: 128 MMHG | HEART RATE: 74 BPM | HEIGHT: 59 IN | DIASTOLIC BLOOD PRESSURE: 80 MMHG | WEIGHT: 147 LBS | BODY MASS INDEX: 29.64 KG/M2

## 2018-02-22 DIAGNOSIS — R15.9 INCONTINENCE OF FECES, UNSPECIFIED FECAL INCONTINENCE TYPE: ICD-10-CM

## 2018-02-22 DIAGNOSIS — M25.511 RIGHT SHOULDER PAIN, UNSPECIFIED CHRONICITY: Primary | ICD-10-CM

## 2018-02-22 DIAGNOSIS — M25.562 LEFT KNEE PAIN, UNSPECIFIED CHRONICITY: ICD-10-CM

## 2018-02-22 DIAGNOSIS — D23.5 BENIGN NEOPLASM OF SKIN OF TRUNK, EXCEPT SCROTUM: ICD-10-CM

## 2018-02-22 DIAGNOSIS — M89.8X6 PAIN IN LEFT TIBIA: ICD-10-CM

## 2018-02-22 DIAGNOSIS — K21.9 GASTROESOPHAGEAL REFLUX DISEASE WITHOUT ESOPHAGITIS: ICD-10-CM

## 2018-02-22 PROCEDURE — G0463 HOSPITAL OUTPT CLINIC VISIT: HCPCS | Mod: 25

## 2018-02-22 PROCEDURE — 73721 MRI JNT OF LWR EXTRE W/O DYE: CPT | Mod: LT

## 2018-02-22 PROCEDURE — 99214 OFFICE O/P EST MOD 30 MIN: CPT | Performed by: FAMILY MEDICINE

## 2018-02-22 ASSESSMENT — ENCOUNTER SYMPTOMS
VOMITING: 0
HEARTBURN: 1
ABDOMINAL PAIN: 0
JOINT SWELLING: 1
ARTHRALGIAS: 1

## 2018-02-22 ASSESSMENT — PAIN SCALES - GENERAL: PAINLEVEL: MODERATE PAIN (4)

## 2018-02-22 NOTE — TELEPHONE ENCOUNTER
Did you want patient MR to be an arthorgram or just an Mr Shoulder WO?    Thank you   Rossy MARIE

## 2018-02-22 NOTE — PROGRESS NOTES
SUBJECTIVE:   Arlyn Holder is a 67 year old female who presents to clinic today for the following health issues:    Right shoulder and left knee pain.  She has been seeing Dr. Lowry for her left knee.  She had an MRI in her knee today and will plan to make an appointment for follow up with him.  He had given her a joint injection 3 weeks ago.  Helped for just a few days, then got flared up.  Her knee started giving her trouble in January, but didn't get any better.  Hasn't been able to exercise since October due to her right shoulder pain.  Has tried elevating legs, moist heat.      Her right shoulder has been going on since October.  She was trying to wrestle a comforter onto the clothes line on a windy day.  [place] a tearing/pulling sensation.  Initially got better, but then got worse again.  Can reach overhead.  Can't lay on her right side due to right shoulder and left knee pain.  The shoulder does wake her up at night.  Had MRI of shoulder in 2012.  She had Physical Therapy at that time, which did help.  She didn't have any further trouble with her shoulder until October.      Has been burping a lot for the past few months.  Isn't taking anything for gerd right now.      Would like a mole checked on her left upper back.      Having issues with stool leakage.  She has seen Dr. Mayes.  She had wanted her to use a fiber supplement to keep her stools bulked, but didn't have anything else to offer her.  She didn't have issues with this until she had a rectocele repaired about 10 years ago.      Personally reviewed as noted below:    Examination: MR KNEE LEFT W/O CONTRAST  2/22/2018 1:21 PM     Clinical History: Female, age 67 years,  ; Pain in left tibia     Comparison: Left knee x-rays 1/30/2018     Technique:  Sagittal proton density fat saturation, T2; axial proton  density with fat saturation; coronal proton-density fat saturation T1.     Findings:     Medial Compartment:     Medial meniscus:  Complex,  degenerative tear involving the posterior  horn/posterior root.       Weightbearing articular cartilage: Diffuse thinning involving the  peripheral articular cartilage of the medial tibial plateau and  central articular cartilage of the medial femoral condyle.       Medial collateral ligament: Intact     ACL:  Intact     PCL:  Intact.     Extensor Mechanism:  Intact.     Lateral Compartment:     Lateral meniscus:  Linear free edge/undersurface tear involving the  body.       Weightbearing articular cartilage:  4 mm near complete focus of  thinning involving the weightbearing lateral femoral condyle,  overlying the posterior horn of the lateral meniscus.        Lateral collateral ligament complex: Intact.       The proximal tibiofibular articulation is congruent.     Femoral patellar joint:   Alignment:  Alignment is within normal limits.      Articular cartilage: Diffuse thinning of the retropatellar articular  cartilage at the medial facet and apex.       Joint space: Large effusion. Synovitis.     Musculature and retinacula: Mild inflammatory changes along the  posterior aspects of the vastus medialis muscle.     Neurovascular structures:  Normal.     Osseous Structures:  Patchy reactive edema and cystic change along the  medial facet and apex of the patella. Reactive edema along the  periphery of the medial tibial plateau.     Other:  Large, complex leaking Baker's cyst.            IMPRESSION:   1.   Complex, degenerative tear involving the posterior horn and  posterior root of the medial meniscus.     2.  Linear tear within the body of the lateral meniscus extends to the  free edge in the inferior articular surface.     3.  Partial and full-thickness cartilaginous defect is seen in the  periphery of the medial tibial plateau, as well as along the medial  facet of the patella, extending to the apex.     4.  Mild thinning involving the weightbearing articular cartilage  centrally along the medial femoral  condyle.     5.  Large joint effusion with synovitis and complex, leaking Rhoades's  cyst.     ABDIRAHMAN NAGY MD    HPI      Patient Active Problem List    Diagnosis Date Noted     Enthesopathy of hip region 02/15/2018     Priority: Medium     Degeneration of cervical intervertebral disc 02/15/2018     Priority: Medium     Overview:   Multilevel degenerative disk disease in cervical lumbar spine without   radiculopathy.  Add also possible right cubital tunnel syndrome at elbow.       Chondromalacia of patella 02/15/2018     Priority: Medium     Hypertension 02/15/2018     Priority: Medium     Enthesopathy of ankle and tarsus 02/15/2018     Priority: Medium     Symptomatic menopausal or female climacteric states 02/15/2018     Priority: Medium     Overview:   Postmenopausal now off hormone replacement therapy.       Acquired deformity of ankle and foot 02/15/2018     Priority: Medium     Other joint derangement, not elsewhere classified, unspecified site 02/15/2018     Priority: Medium     Overview:   preloader comment: selections available to preload differs from paper chart.    *Subluxation L3-L4 with mild stenosis       Uterine prolapse 02/15/2018     Priority: Medium     Overview:   preloader comment: selections available to preload differs from paper chart.    *Pelvic floor relaxation       Ascending aorta dilatation (H) 03/23/2016     Priority: Medium     Overview:   3.5-3.6 cm on CT 3/2016 - follow up in 1 year       Health care maintenance 03/12/2015     Priority: Medium     Arthritis of right wrist 11/07/2013     Priority: Medium     GERD (gastroesophageal reflux disease) 12/03/2012     Priority: Medium     Right shoulder pain 12/03/2012     Priority: Medium     Tendonitis of wrist, right 12/03/2012     Priority: Medium     Hyperlipidemia 10/04/2011     Priority: Medium     Osteoarthritis of ankle or foot 10/04/2011     Priority: Medium     Plantar fasciitis 10/04/2011     Priority: Medium     Disorder of  skin or subcutaneous tissue 10/04/2011     Priority: Medium     Carpal tunnel syndrome 2010     Priority: Medium     Disorder of bursae and tendons in shoulder region 2010     Priority: Medium     Past Medical History:   Diagnosis Date     Bunion of great toe of right foot     No Comments Provided     Carpal tunnel syndrome     2010     Essential (primary) hypertension     No Comments Provided     Gastro-esophageal reflux disease without esophagitis     12/3/2012     Hyperlipidemia     10/4/2011     Impingement syndrome of right shoulder     10/09/07     Personal history of other medical treatment (CODE)      3, Para 3 with three vaginal deliveries     Thoracic aortic ectasia (H)     3/23/2016,3.5-3.6 cm on CT 3/2016 - follow up in 1 year     Uterovaginal prolapse     preloader comment: selections available to preload differs from paper chart.   *Pelvic floor relaxation      Past Surgical History:   Procedure Laterality Date     BUNIONECTOMY      ,right     COLONOSCOPY      , normal     COLONOSCOPY      3/16/15,F/U      HYSTERECTOMY VAGINAL      ,for uterine prolapse with A&P repair.  Tube and ovaries still in place.     LAPAROSCOPY DIAGNOSTIC (GYN)      ,for pelvic adhesion     LAPAROSCOPY DIAGNOSTIC (GYN)      ,vaginal vault prolapse - Dr. Mumtaz Marshall - anterior/posterior repair with bladder mesh.     RELEASE CARPAL TUNNEL      Dr. Méndez     Family History   Problem Relation Age of Onset     DIABETES Father      Diabetes     Hypertension Father      Hypertension     HEART DISEASE Father      Heart Disease     Other - See Comments Father       hearing loss, kidney problems.     HEART DISEASE Mother      Heart Disease     Hypertension Mother      Hypertension     CANCER Maternal Grandmother      Cancer,bone cancer in her leg     Other - See Comments Maternal Grandmother      Psychiatric illness,depression     HEART DISEASE Son      Heart Disease,MI - first at  age 48     Breast Cancer Sister      Cancer-breast     DIABETES Sister      Diabetes     HEART DISEASE Sister      Heart Disease,angioplasty CAD     Other - See Comments Sister      Right ear deafness - surgically repaired     Other - See Comments Sister      depression     DIABETES Sister      Diabetes     Hypertension Sister      Hypertension     Other - See Comments Sister      car accident age 75 with multiple fractures - in nursing home.     HEART DISEASE Brother      Heart Disease     HEART DISEASE Brother      Heart Disease     Hypertension Brother      Hypertension     Other - See Comments Brother      vertigo, also kidney problems.     Social History   Substance Use Topics     Smoking status: Passive Smoke Exposure - Never Smoker     Smokeless tobacco: Never Used      Comment: Quit smoking:  was a smoker     Alcohol use No     Social History     Social History Narrative    The patient is .  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  Justice Holder  Children Ilya born 10/11/65, Gregorio born 7/7/68, Niranjan born 12/31/70.  Sco    tt-grandson born 2/23/87 who is the son of Ilya.     Current Outpatient Prescriptions   Medication Sig Dispense Refill     atorvastatin (LIPITOR) 20 MG tablet Take 20 mg by mouth daily       Cod Liver Oil 5000-500 UNIT/5ML OIL        coenzyme Q-10 10 MG CAPS Take 10 mg by mouth daily       Cyanocobalamin (VITAMIN B-12 CR) 1000 MCG TBCR        cyclobenzaprine (FLEXERIL) 10 MG tablet Take 10 mg by mouth 3 times daily       diclofenac (VOLTAREN) 1 % GEL topical gel Apply topically 2 times daily       Flaxseed, Linseed, (EQL FLAXSEED OIL) 1200 MG CAPS Take 1 capsule by mouth 3 times daily       glucosamine-chondroitinoitin 500-400 MG TABS        hydrochlorothiazide (HYDRODIURIL) 25 MG tablet Take 25 mg by mouth daily       lecithin 400 MG CAPS Take 1 capsule by mouth 2 times daily  "      omega-3 acid ethyl esters (LOVAZA) 1 G capsule Take 1,000 mg by mouth       Potassium Chloride ER 20 MEQ TBCR Take 20 mEq by mouth daily with food       Red Yeast Rice Extract 600 MG CAPS        triamcinolone (KENALOG) 0.1 % cream Apply topically 2 times daily       vitamin E (TOCOPHEROL) 100 UNIT capsule        Allergies   Allergen Reactions     Codeine Nausea and Vomiting     Diazepam      Other reaction(s): Hallucinations     Naproxen Other (See Comments)     Mouth sores     Sulfa Drugs Unknown     Sulfacetamide Rash     Had a reaction to sulfa drug but is not sure which one it was-states the reaction resulted in redness       Review of Systems   Gastrointestinal: Positive for heartburn. Negative for abdominal pain and vomiting.   Musculoskeletal: Positive for arthralgias, gait problem and joint swelling.        OBJECTIVE:     /80 (BP Location: Right arm, Patient Position: Sitting, Cuff Size: Adult Regular)  Pulse 74  Ht 4' 11\" (1.499 m)  Wt 147 lb (66.7 kg)  BMI 29.69 kg/m2  Body mass index is 29.69 kg/(m^2).  Physical Exam   Constitutional: She appears well-developed.   HENT:   Head: Normocephalic.   Eyes: Pupils are equal, round, and reactive to light.   Neck: Normal range of motion. Neck supple. No tracheal deviation present. No thyromegaly present.   Cardiovascular: Normal rate, regular rhythm and normal heart sounds.    No murmur heard.  Pulmonary/Chest: Effort normal and breath sounds normal. No respiratory distress. She has no wheezes. She has no rales.   Abdominal: Soft. Bowel sounds are normal. She exhibits no distension and no mass. There is no tenderness. There is no rebound and no guarding.   Musculoskeletal:   Right shoulder: She is able to abduct fully to 180 .  Neer's sign is negative.  Colin sign positive.  Empty can test positive.    Left knee: She does have an effusion noted.  Also fullness behind the knee noted, which is also tender.   Lymphadenopathy:     She has no cervical " adenopathy.   Skin: Skin is warm and dry.   Several raised, rough lesions ranging from light to dark brown noted on her back, consistent with seborrheic keratoses.         PHQ-2 Score:     PHQ-2 ( 1999 Pfizer) 2/22/2018   Q1: Little interest or pleasure in doing things 0   Q2: Feeling down, depressed or hopeless 0   PHQ-2 Score 0         Diagnostic Test Results:  MRI as noted above.    ASSESSMENT/PLAN:         ICD-10-CM    1. Right shoulder pain, unspecified chronicity M25.511 MR Shoulder Right w Contrast   2. Left knee pain, unspecified chronicity M25.562 ORTHOPEDICS ADULT REFERRAL   3. Gastroesophageal reflux disease without esophagitis K21.9    4. Incontinence of feces, unspecified fecal incontinence type R15.9 COLORECTAL SURGERY REFERRAL   5. Benign neoplasm of skin of trunk D23.5      1.  Will evaluate further with MRI.  Consider either PT referral versus Ortho [facility] referral if needed.  She would like to see Dr. Painting for this problem if indicated by MRI.  2.  Dr. Lowry had indicated that he wanted to see her back after her MRI was completed.  Her MRI is done now and reviewed this with her as noted above.  She is assisted in making a follow-up appointment with him.  3.  Recommended use of over-the-counter Zantac to see if this relieves her symptoms.  4.  Reviewed Dr. Mayes's last note from October 2016 with patient.  She is referred back to aloe up with her.  5.  Reassured patient that these spots look like they are seborrheic keratoses and do not need to be removed at this time.  Follow-up if any worsening or worrisome changes.      Brooke Castaneda MD  [facility]

## 2018-02-22 NOTE — PATIENT INSTRUCTIONS
I would recommend using over the counter Zantac 150 mg by mouth twice daily as needed for acid reflux/burping.

## 2018-02-22 NOTE — NURSING NOTE
Patient is here with right shoulder and left knee pain . She rates it at #4 out of #10 pain scale.  Keily Crystal LPN ....................2/22/2018  2:33 PM

## 2018-02-22 NOTE — MR AVS SNAPSHOT
After Visit Summary   2/22/2018    Arlyn Holder    MRN: [id number]           Patient Information     Date Of Birth          1950        Visit Information        Provider Department      2/22/2018 2:30 PM Brooke Castaneda MD [facility]        Today's Diagnoses     Right shoulder pain, unspecified chronicity    -  1    Left knee pain, unspecified chronicity        Gastroesophageal reflux disease without esophagitis        Incontinence of feces, unspecified fecal incontinence type          Care Instructions    I would recommend using over the counter Zantac 150 mg by mouth twice daily as needed for acid reflux/burping.          Follow-ups after your visit        Additional Services     COLORECTAL SURGERY REFERRAL       Your provider has referred you to: Dr. Gerber Reyna in [place]    Referral Reason(s): Fecal Incontinence  Special Concerns: None  This referral is: Elective (week +)  It is OK to leave a message on patient's voicemail.    Please be aware that coverage of these services is subject to the terms and limitations of your health insurance plan.  Call member services at your health plan with any benefit or coverage questions.      Please bring the following with you to your appointment:    (1) Any X-Rays, CTs or MRIs which have been performed.  Contact the facility where they were done to arrange for  prior to your scheduled appointment.    (2) List of current medications  (3) This referral request   (4) Any documents/labs given to you for this referral            ORTHOPEDICS ADULT REFERRAL       Your provider has referred you to: [facility]: [facility] [phone] [email or web address] - Dr. Lowry    Please be aware that coverage of these services is subject to the terms and limitations of your health insurance plan.  Call member services at your health plan with any benefit or coverage questions.       Please bring the following to your appointment:    >>   Any x-rays, CTs or MRIs which have been performed.  Contact the facility where they were done to arrange for  prior to your scheduled appointment.    >>   List of current medications   >>   This referral request   >>   Any documents/labs given to you for this referral                  Your next 10 appointments already scheduled     Feb 27, 2018  9:15 AM CST   [facility]    [address]  [facility] [id number] [phone]            Mar 29, 2018  9:45 AM CDT   PHYSICAL with Brooke Castaneda MD   [facility]    [address]  [facility] [id number] [phone]              Future tests that were ordered for you today     Open Future Orders        Priority Expected Expires Ordered    MR Shoulder Right w Contrast Routine  2/22/2019 2/22/2018            Who to contact     If you have questions or need follow up information about today's clinic visit or your schedule please contact [facility] directly at [phone].  Normal or non-critical lab and imaging results will be communicated to you by [employer], letter or phone within 4 business days after the clinic has received the results. If you do not hear from us within 7 days, please contact the clinic through [employer] or phone. If you have a critical or abnormal lab result, we will notify you by phone as soon as possible.  Submit refill requests through [employer] or call your pharmacy and they will forward the refill request to us. Please allow 3 business days for your refill to be completed.          Additional Information About Your Visit        Ma[employer] Information     [employer] lets you send messages to your doctor, view your test results, renew your prescriptions, schedule appointments and more. To sign  "up, go to [email or web address] . Click on \"Log in\" on the left side of the screen, which will take you to the Welcome page. Then click on \"Sign up Now\" on the right side of the page.     You will be asked to enter the access code listed below, as well as some personal information. Please follow the directions to create your username and password.     Your access code is: [email or web address]  Expires: 2018 11:53 AM     Your access code will  in 90 days. If you need help or a new code, please call your [place] clinic or [phone].        Care EveryWhere ID     This is your Care EveryWhere ID. This could be used by other organizations to access your [place] medical records  [id number]        Your Vitals Were     Pulse Height BMI (Body Mass Index)             74 1.499 m (4' 11\") 29.69 kg/m2          Blood Pressure from Last 3 Encounters:   18 128/80   18 (!) 142/100   18 138/76    Weight from Last 3 Encounters:   18 66.7 kg (147 lb)   18 65.3 kg (144 lb)   18 65.3 kg (144 lb)              We Performed the Following     COLORECTAL SURGERY REFERRAL     ORTHOPEDICS ADULT REFERRAL        Primary Care Provider Office Phone # Fax #    Brooke Anastasiya MD [place] [phone]       [address] [facility] [address]        Equal Access to Services     [facility] : Hadii aad ku hadasho Soomaali, waaxda luqadaha, qaybta kaalmada adeegyada, waxay elda carranza . So [facility] [phone].    ATENCIÓN: Si jasperla norma, tiene a frank disposición servicios gratuitos de asistencia lingüística. Kristin al [phone].    We comply with applicable federal civil rights laws and [place] laws. We do not discriminate on the basis of race, color, national origin, age, disability, sex, sexual orientation, or gender identity.            Thank you!     Thank you for choosing [facility]  for your care. Our goal is always to provide " you with excellent care. Hearing back from our patients is one way we can continue to improve our services. Please take a few minutes to complete the written survey that you may receive in the mail after your visit with us. Thank you!             Your Updated Medication List - Protect others around you: Learn how to safely use, store and throw away your medicines at www.disposemymeds.org.          This list is accurate as of 2/22/18  3:32 PM.  Always use your most recent med list.                   Brand Name Dispense Instructions for use Diagnosis    atorvastatin 20 MG tablet    LIPITOR     Take 20 mg by mouth daily        Cod Liver Oil 5000-500 UNIT/5ML Oil           coenzyme Q-10 10 MG Caps      Take 10 mg by mouth daily        cyclobenzaprine 10 MG tablet    FLEXERIL     Take 10 mg by mouth 3 times daily        diclofenac 1 % Gel topical gel    VOLTAREN     Apply topically 2 times daily        EQL FLAXSEED OIL 1200 MG Caps      Take 1 capsule by mouth 3 times daily        glucosamine-chondroitinoitin 500-400 MG Tabs           hydrochlorothiazide 25 MG tablet    HYDRODIURIL     Take 25 mg by mouth daily        lecithin 400 MG Caps      Take 1 capsule by mouth 2 times daily        omega-3 acid ethyl esters 1 G capsule    Lovaza     Take 1,000 mg by mouth        Potassium Chloride ER 20 MEQ Tbcr      Take 20 mEq by mouth daily with food        Red Yeast Rice Extract 600 MG Caps           triamcinolone 0.1 % cream    KENALOG     Apply topically 2 times daily        Vitamin B-12 CR 1000 MCG Tbcr           vitamin E 100 UNIT capsule    TOCOPHEROL

## 2018-02-23 ENCOUNTER — TELEPHONE (OUTPATIENT)
Dept: FAMILY MEDICINE | Facility: OTHER | Age: 68
End: 2018-02-23

## 2018-02-23 NOTE — TELEPHONE ENCOUNTER
Called back to [facility] to give information . Sat on hold . Printed off note.    Keily Crystal LPN ....................2/23/2018  4:17 PM

## 2018-02-23 NOTE — TELEPHONE ENCOUNTER
[facility] called to verify that the doctor wants the right shoulder MR with contrast is to be a arthroscope . Please advise .  Keily Crystal LPN ....................2/23/2018  3:32 PM

## 2018-02-23 NOTE — TELEPHONE ENCOUNTER
Sorry.  I wanted just a plain right shoulder MRI.  Are they able to change the order for me?  Brooke Castaneda

## 2018-02-26 ENCOUNTER — HOSPITAL ENCOUNTER (OUTPATIENT)
Dept: MRI IMAGING | Facility: OTHER | Age: 68
Discharge: HOME | End: 2018-02-26
Attending: FAMILY MEDICINE | Admitting: FAMILY MEDICINE
Payer: MEDICARE

## 2018-02-26 DIAGNOSIS — M12.811 ROTATOR CUFF TEAR ARTHROPATHY OF RIGHT SHOULDER: Primary | ICD-10-CM

## 2018-02-26 DIAGNOSIS — M75.101 ROTATOR CUFF TEAR ARTHROPATHY OF RIGHT SHOULDER: Primary | ICD-10-CM

## 2018-02-26 DIAGNOSIS — M25.511 RIGHT SHOULDER PAIN, UNSPECIFIED CHRONICITY: ICD-10-CM

## 2018-02-26 DIAGNOSIS — M19.011 ARTHRITIS OF RIGHT ACROMIOCLAVICULAR JOINT: ICD-10-CM

## 2018-02-26 PROCEDURE — 73221 MRI JOINT UPR EXTREM W/O DYE: CPT | Mod: RT

## 2018-02-27 ENCOUNTER — OFFICE VISIT (OUTPATIENT)
Dept: ORTHOPEDICS | Facility: OTHER | Age: 68
End: 2018-02-27
Attending: FAMILY MEDICINE
Payer: COMMERCIAL

## 2018-02-27 VITALS
DIASTOLIC BLOOD PRESSURE: 78 MMHG | SYSTOLIC BLOOD PRESSURE: 122 MMHG | BODY MASS INDEX: 29.64 KG/M2 | HEART RATE: 80 BPM | HEIGHT: 59 IN | WEIGHT: 147 LBS

## 2018-02-27 DIAGNOSIS — M19.019 ACROMIOCLAVICULAR JOINT ARTHRITIS: ICD-10-CM

## 2018-02-27 DIAGNOSIS — M75.41 IMPINGEMENT SYNDROME, SHOULDER, RIGHT: ICD-10-CM

## 2018-02-27 DIAGNOSIS — M25.562 LEFT KNEE PAIN, UNSPECIFIED CHRONICITY: ICD-10-CM

## 2018-02-27 DIAGNOSIS — M25.462 EFFUSION OF LEFT KNEE: ICD-10-CM

## 2018-02-27 DIAGNOSIS — M71.9 DISORDER OF BURSAE AND TENDONS IN SHOULDER REGION: Primary | ICD-10-CM

## 2018-02-27 DIAGNOSIS — M67.919 DISORDER OF BURSAE AND TENDONS IN SHOULDER REGION: Primary | ICD-10-CM

## 2018-02-27 DIAGNOSIS — M75.121 COMPLETE TEAR OF RIGHT ROTATOR CUFF: ICD-10-CM

## 2018-02-27 DIAGNOSIS — M25.511 RIGHT SHOULDER PAIN, UNSPECIFIED CHRONICITY: ICD-10-CM

## 2018-02-27 DIAGNOSIS — M17.12 PRIMARY OSTEOARTHRITIS OF LEFT KNEE: ICD-10-CM

## 2018-02-27 DIAGNOSIS — M23.204 OLD COMPLEX TEAR OF MEDIAL MENISCUS OF LEFT KNEE: Primary | ICD-10-CM

## 2018-02-27 DIAGNOSIS — M23.201 OLD COMPLEX TEAR OF LATERAL MENISCUS OF LEFT KNEE: ICD-10-CM

## 2018-02-27 DIAGNOSIS — M71.22 POPLITEAL CYST, LEFT: ICD-10-CM

## 2018-02-27 PROCEDURE — G0463 HOSPITAL OUTPT CLINIC VISIT: HCPCS

## 2018-02-27 PROCEDURE — 99213 OFFICE O/P EST LOW 20 MIN: CPT | Performed by: ORTHOPAEDIC SURGERY

## 2018-02-27 ASSESSMENT — PAIN SCALES - GENERAL: PAINLEVEL: MODERATE PAIN (4)

## 2018-02-27 NOTE — PROGRESS NOTES
"PROGRESS NOTE    SUBJECTIVE:  Arlyn Holder is here for recheck of a left knee after recent MRI.  Patient relates that he is doing about the same.  Generalized discomfort with activity.    She had some increased right shoulder pain recently so her primary provider ordered a right shoulder MRI without dye that was performed yesterday.  Patient relates she fell out of a deer stand about 5-6 years ago.   Last year, she remembers trying to handle a comforter on a clothes line that began to blow away.  She noticed discomfort to the right shoulder area sometime after that event.  Pain to the right shoulder when she sleeps on the right side.  Increase discomfort when she lifts and moves the arms on certain occasions.  She also noticed some irregularity of her right biceps compared to the left side.  That was noticed last October or November.    Review of Systems:  Constitutional: Denies constitutional problems    PFSH:  No change in information. See earlier PFSH questionnaire completed by the patient on initial visit.    OBJECTIVE:  /78 (BP Location: Right arm, Patient Position: Sitting, Cuff Size: Adult Regular)  Pulse 80  Ht 1.499 m (4' 11.02\")  Wt 66.7 kg (147 lb)  BMI 29.67 kg/m2Body mass index is 29.67 kg/(m^2).  Patient is alert and orientated x3 and answers questions appropriately.    Left knee exam:   See prior examination of the knee    Right shoulder:   Active elevation: She reaches up to about 160 .  Increased discomfort with abduction beyond  .  Reaches behind the back slowly to the L1 level.  Mild weakness with supraspinatus strength testing.  Negative drop arm.  Positive Neer's test.  Positive Colin sign.  Arm at side: Passive internal rotation across abdomen, external rotation about 40 .  Generalized right shoulder discomfort on exam.    Radiographic images were independently reviewed and discussed with the patient.   X RAY:   Examination: MR KNEE LEFT W/O CONTRAST  2/22/2018 1:21 " PM    Clinical History: Female, age 67 years, ; Pain in left tibia    Comparison: Left knee x-rays 1/30/2018    Technique: Sagittal proton density fat saturation, T2; axial proton  density with fat saturation; coronal proton-density fat saturation T1.    Findings:    Medial Compartment:   Medial meniscus: Complex, degenerative tear involving the posterior  horn/posterior root.    Weightbearing articular cartilage: Diffuse thinning involving the  peripheral articular cartilage of the medial tibial plateau and  central articular cartilage of the medial femoral condyle.    Medial collateral ligament: Intact    ACL: Intact    PCL: Intact.    Extensor Mechanism: Intact.    Lateral Compartment:   Lateral meniscus: Linear free edge/undersurface tear involving the  body.    Weightbearing articular cartilage: 4 mm near complete focus of  thinning involving the weightbearing lateral femoral condyle,  overlying the posterior horn of the lateral meniscus.     Lateral collateral ligament complex: Intact.    The proximal tibiofibular articulation is congruent.    Femoral patellar joint:  Alignment: Alignment is within normal limits.    Articular cartilage: Diffuse thinning of the retropatellar articular  cartilage at the medial facet and apex.     Joint space: Large effusion. Synovitis.    Musculature and retinacula: Mild inflammatory changes along the  posterior aspects of the vastus medialis muscle.    Neurovascular structures: Normal.    Osseous Structures: Patchy reactive edema and cystic change along the  medial facet and apex of the patella. Reactive edema along the  periphery of the medial tibial plateau.    Other: Large, complex leaking Baker's cyst.      IMPRESSION:   1. Complex, degenerative tear involving the posterior horn and  posterior root of the medial meniscus.    2. Linear tear within the body of the lateral meniscus extends to the  free edge in the inferior articular surface.    3. Partial and full-thickness  cartilaginous defect is seen in the  periphery of the medial tibial plateau, as well as along the medial  facet of the patella, extending to the apex.    4. Mild thinning involving the weightbearing articular cartilage  centrally along the medial femoral condyle.    5. Large joint effusion with synovitis and complex, leaking Rhoades's  cyst.    ABDIRAHMAN NAGY MD    Final Report   MR SHOULDER RIGHT WITHOUT CONTRAST February 26, 2018 at 1010 hours    CLINICAL HISTORY: Right shoulder pain, unspecified chronicity.  Shoulder pain for the past several months.    COMPARISON: MRI right shoulder 12/6/2012.    TECHNIQUE: Coronal T2 FAT-SAT, sagittal T2 FAT-SAT and T1, axial T2  FAT-SAT and gradient echo MR images of the shoulder were obtained.    FINDINGS:    Rotator cuff: There is a full-thickness tear of the supraspinatus  tendon anteriorly, measuring 1.4 cm in AP dimension by 7 mm in  transverse dimension. The more posterior supraspinatus fibers remain  intact. There is also moderate tendinosis of infraspinatus tendon.  There is moderate tendinosis of the subscapularis tendon. Low signal  intensity foci within the distal fibers of the supraspinatus tendon  are again suggestive of calcific tendinosis. There is no muscle tear  or atrophy. There is a trace subacromial/subdeltoid bursal effusion.    Acromioclavicular joint: There are advanced degenerative changes of  the acromioclavicular joint with undersurface hypertrophy producing  mass effect on the rotator cuff.    Biceps tendon: There is moderate tendinosis of the biceps tendon in  the bicipital groove. No tear is seen.    Glenohumeral joint: There is diffuse hyperintensity of the superior  and posterior labrum, suggesting diffuse degeneration and nondisplaced  tearing. Thickening of the anterior labrum is unchanged. No  significant joint effusion. No acute fracture or dislocation. The  cartilage is slightly thinned but otherwise intact.    IMPRESSION:  1. Full-thickness  supraspinatus tendon tear. Additional tendinosis and  calcific tendinosis of the remainder of the supraspinatus and  infraspinatus tendons.  2. Moderate subscapularis tendinosis without tear.  3. Moderate biceps tendinosis. No tear.  4. Degenerative disease of the acromioclavicular joint with  undersurface hypertrophy producing mass effect on the rotator cuff.  This may contribute to symptoms of impingement syndrome.    MARLYS SPENCER MD    ASSESSMENT:  Right knee:   Full thickness tear posterior horn medial meniscus,  lateral meniscal tear,   signs of chondromalacia/osteoarthritis,   Baker's cyst,   joint effusion    Right shoulder: Full thickness tear supraspinatus tendon  Right shoulder subscapularis tendinosis  Right shoulder acromioclavicular arthritis  Right shoulder impingement syndrome    PLAN:  Discussion included review of recent MRIs of the left knee and right shoulder.  Discussed with the patient and she has articular and meniscal pathology on the left knee along with fluid collection and a Baker's cyst.  Degenerative changes noted.  The shoulder demonstrates rotator cuff pathology along with arthritic changes to the acromioclavicular joint.    Recommendations for the left knee include arthritis medication, cortisone injection which she has received in the past.  Also discussed surgical treatment options including knee arthroscopy with probable partial meniscectomies and chondroplasties.    Regarding the right shoulder: Recommend patient see Dr. Painting for evaluation and treatment recommendations.  Patient relates she was planning to see him for the shoulder condition.    She will consider surgical treatment options for the left knee but at this point she cares for her  and has other life events occurring at the same time.    Discussed  left  knee arthroscopy along with potential risks and benefits including but not limited to scar, bleeding, infection, continued symptoms even after surgery,  recovery phase.     She will call back regarding the left knee.    Jesse Lowry D.O.  Orthopedic Surgeon    [facility]  [address] [place] [address]  Phone [phone]  Fax [phone]    2/27/2018

## 2018-02-27 NOTE — MR AVS SNAPSHOT
After Visit Summary   2/27/2018    Arlyn Holder    MRN: [id number]           Patient Information     Date Of Birth          1950        Visit Information        Provider Department      2/27/2018 9:15 AM Jesse Lowry DO [facility]        Today's Diagnoses     Old complex tear of medial meniscus of left knee    -  1    Left knee pain, unspecified chronicity        Old complex tear of lateral meniscus of left knee        Effusion of left knee        Popliteal cyst, left        Primary osteoarthritis of left knee        Complete tear of right rotator cuff        Acromioclavicular joint arthritis        Impingement syndrome, shoulder, right           Follow-ups after your visit        Follow-up notes from your care team     Return if symptoms worsen or fail to improve.      Your next 10 appointments already scheduled     Mar 05, 2018  9:45 AM CST   New Visit with Wilner Painting DO   [facility] ([facility])    [address]  [place] [address]   [phone]            Mar 29, 2018  9:45 AM CDT   [facility]YSICAL with Brooke Castaneda [facility]   [facility]    [address]  [place] [address]   [phone]              Who to contact     If you have questions or need follow up information about today's clinic visit or your schedule please contact [facility] directly at [phone].  Normal or non-critical lab and imaging results will be communicated to you by MyChart, letter or phone within 4 business days after the clinic has received the results. If you do not hear from us within 7 days, please contact the clinic through MyChart or phone. If you have a critical or abnormal lab result, we will notify you by phone as soon as possible.  Submit refill requests through [employer] or call your pharmacy and they will forward the refill  "request to us. Please allow 3 business days for your refill to be completed.          Additional Information About Your Visit        MyChart Information     [employer] lets you send messages to your doctor, view your test results, renew your prescriptions, schedule appointments and more. To sign up, go to [email or web address] . Click on \"Log in\" on the left side of the screen, which will take you to the Welcome page. Then click on \"Sign up Now\" on the right side of the page.     You will be asked to enter the access code listed below, as well as some personal information. Please follow the directions to create your username and password.     Your access code is: [email or web address]  Expires: 2018 11:53 AM     Your access code will  in 90 days. If you need help or a new code, please call your [facility] clinic or [phone].        Care EveryWhere ID     This is your Care EveryWhere ID. This could be used by other organizations to access your [facility] medical records  [id number]        Your Vitals Were     Pulse Height BMI (Body Mass Index)             80 1.499 m (4' 11.02\") 29.67 kg/m2          Blood Pressure from Last 3 Encounters:   18 122/78   18 128/80   18 (!) 142/100    Weight from Last 3 Encounters:   18 66.7 kg (147 lb)   18 66.7 kg (147 lb)   18 65.3 kg (144 lb)              Today, you had the following     No orders found for display       Primary Care Provider Office Phone # Fax #    Brooke Castaneda [phone] [address] [facility] [address]        Equal Access to Services     BLAIRE MEIER : Owen Ruggiero, ale lau, aruna young. So [facility] [phone].    ATENCIÓN: Si habla español, tiene a frank disposición servicios gratuitos de asistencia lingüística. Kristin al [phone].    We comply with applicable federal civil rights laws and [place] " laws. We do not discriminate on the basis of race, color, national origin, age, disability, sex, sexual orientation, or gender identity.            Thank you!     Thank you for choosing [facility] AND [facility]  for your care. Our goal is always to provide you with excellent care. Hearing back from our patients is one way we can continue to improve our services. Please take a few minutes to complete the written survey that you may receive in the mail after your visit with us. Thank you!             Your Updated Medication List - Protect others around you: Learn how to safely use, store and throw away your medicines at www.disposemymeds.org.          This list is accurate as of 2/27/18 10:43 AM.  Always use your most recent med list.                   Brand Name Dispense Instructions for use Diagnosis    atorvastatin 20 MG tablet    LIPITOR     Take 20 mg by mouth daily        Cod Liver Oil 5000-500 UNIT/5ML Oil           diclofenac 1 % Gel topical gel    VOLTAREN     Apply topically 2 times daily        EQL FLAXSEED OIL 1200 MG Caps      Take 1 capsule by mouth 3 times daily        hydrochlorothiazide 25 MG tablet    HYDRODIURIL     Take 25 mg by mouth daily        lecithin 400 MG Caps      Take 1 capsule by mouth 2 times daily        omega-3 acid ethyl esters 1 G capsule    Lovaza     Take 1,000 mg by mouth        Potassium Chloride ER 20 MEQ Tbcr      Take 20 mEq by mouth daily with food        triamcinolone 0.1 % cream    KENALOG     Apply topically 2 times daily        Vitamin B-12 CR 1000 MCG Tbcr           vitamin E 100 UNIT capsule    TOCOPHEROL

## 2018-02-27 NOTE — NURSING NOTE
Patient is here for a follow up on her left knee and to go over her MRI.  Vanessa Orr LPN .......2/27/2018 9:12 AM

## 2018-03-01 PROBLEM — M75.121 COMPLETE TEAR OF RIGHT ROTATOR CUFF: Status: ACTIVE | Noted: 2018-03-01

## 2018-03-01 PROBLEM — M19.019 AC (ACROMIOCLAVICULAR) ARTHRITIS: Status: ACTIVE | Noted: 2018-03-01

## 2018-03-01 PROBLEM — M75.41 IMPINGEMENT SYNDROME OF RIGHT SHOULDER: Status: ACTIVE | Noted: 2018-03-01

## 2018-03-05 ENCOUNTER — HOSPITAL ENCOUNTER (OUTPATIENT)
Dept: GENERAL RADIOLOGY | Facility: OTHER | Age: 68
Discharge: HOME | End: 2018-03-05
Attending: ORTHOPAEDIC SURGERY | Admitting: ORTHOPAEDIC SURGERY
Payer: MEDICARE

## 2018-03-05 ENCOUNTER — OFFICE VISIT (OUTPATIENT)
Dept: ORTHOPEDICS | Facility: OTHER | Age: 68
End: 2018-03-05
Attending: ORTHOPAEDIC SURGERY
Payer: MEDICARE

## 2018-03-05 VITALS
HEIGHT: 59 IN | SYSTOLIC BLOOD PRESSURE: 136 MMHG | WEIGHT: 147 LBS | DIASTOLIC BLOOD PRESSURE: 86 MMHG | HEART RATE: 72 BPM | BODY MASS INDEX: 29.64 KG/M2

## 2018-03-05 DIAGNOSIS — M75.101 ROTATOR CUFF TEAR ARTHROPATHY OF RIGHT SHOULDER: ICD-10-CM

## 2018-03-05 DIAGNOSIS — M19.019 AC (ACROMIOCLAVICULAR) ARTHRITIS: ICD-10-CM

## 2018-03-05 DIAGNOSIS — M75.41 IMPINGEMENT SYNDROME OF RIGHT SHOULDER: ICD-10-CM

## 2018-03-05 DIAGNOSIS — M67.919 DISORDER OF BURSAE AND TENDONS IN SHOULDER REGION: ICD-10-CM

## 2018-03-05 DIAGNOSIS — M12.811 ROTATOR CUFF TEAR ARTHROPATHY OF RIGHT SHOULDER: ICD-10-CM

## 2018-03-05 DIAGNOSIS — M25.511 RIGHT SHOULDER PAIN, UNSPECIFIED CHRONICITY: ICD-10-CM

## 2018-03-05 DIAGNOSIS — M19.011 ARTHRITIS OF RIGHT ACROMIOCLAVICULAR JOINT: ICD-10-CM

## 2018-03-05 DIAGNOSIS — M71.9 DISORDER OF BURSAE AND TENDONS IN SHOULDER REGION: ICD-10-CM

## 2018-03-05 DIAGNOSIS — M75.121 COMPLETE TEAR OF RIGHT ROTATOR CUFF: Primary | ICD-10-CM

## 2018-03-05 PROCEDURE — G0463 HOSPITAL OUTPT CLINIC VISIT: HCPCS

## 2018-03-05 PROCEDURE — G0463 HOSPITAL OUTPT CLINIC VISIT: HCPCS | Mod: 25

## 2018-03-05 PROCEDURE — 99215 OFFICE O/P EST HI 40 MIN: CPT | Performed by: ORTHOPAEDIC SURGERY

## 2018-03-05 PROCEDURE — 73030 X-RAY EXAM OF SHOULDER: CPT | Mod: RT

## 2018-03-05 ASSESSMENT — PAIN SCALES - GENERAL: PAINLEVEL: MODERATE PAIN (5)

## 2018-03-05 NOTE — PROGRESS NOTES
Arlyn Holder was seen in consultation for Brooke Castaneda MD for a chief complaint of pain into the right shoulder.    CHIEF COMPLAINT: Arlyn Holder is a 67 year old  female  Chief Complaint   Patient presents with     RECHECK     Right shoulder       HISTORY OF PRESENTING INJURY   History of presenting injury, patient sustained an injury to her right shoulder in October when she was trying to place a comforter on the close line and to get windy and she reached out quick to grab it.  She has noticed increased pain especially at night with activities and also is hard to sleep on it.  She eventually underwent an MRI has tried her home therapy and Tylenol with minimal relief.  Description of pain: mild.  Radiation of pain: Yes.  Pain course: gradually worsening.  Worse with: Overhead activities and pushing and pulling.  Improved by: Rest.  History of injection: no.  Any PT: no.      PAST MEDICAL HISTORY:  Past Medical History:   Diagnosis Date     AC (acromioclavicular) arthritis 3/1/2018     Bunion of great toe of right foot     No Comments Provided     Carpal tunnel syndrome     2010     Complete tear of right rotator cuff 3/1/2018     Essential (primary) hypertension     No Comments Provided     Gastro-esophageal reflux disease without esophagitis     12/3/2012     Hyperlipidemia     10/4/2011     Impingement syndrome of right shoulder     10/09/07     Personal history of other medical treatment (CODE)      3, Para 3 with three vaginal deliveries     Thoracic aortic ectasia (H)     3/23/2016,3.5-3.6 cm on CT 3/2016 - follow up in 1 year     Uterovaginal prolapse     preloader comment: selections available to preload differs from paper chart.   *Pelvic floor relaxation       PAST SURGICAL HISTORY:  Past Surgical History:   Procedure Laterality Date     BUNIONECTOMY      ,right     COLONOSCOPY      , normal     COLONOSCOPY      3/16/15,F/U      HYSTERECTOMY VAGINAL      ,for  uterine prolapse with A&P repair.  Tube and ovaries still in place.     LAPAROSCOPY DIAGNOSTIC (GYN)      1989,for pelvic adhesion     LAPAROSCOPY DIAGNOSTIC (GYN)      11/08,vaginal vault prolapse - Dr. Mumtaz Marshall - anterior/posterior repair with bladder mesh.     RELEASE CARPAL TUNNEL      Dr. Méndez       ALLERGIES:  Allergies   Allergen Reactions     Codeine Nausea and Vomiting     Diazepam      Other reaction(s): Hallucinations     Naproxen Other (See Comments)     Mouth sores     Sulfa Drugs Unknown     Sulfacetamide Rash     Had a reaction to sulfa drug but is not sure which one it was-states the reaction resulted in redness       CURRENT MEDICATIONS:  Current Outpatient Prescriptions   Medication Sig Dispense Refill     atorvastatin (LIPITOR) 20 MG tablet Take 20 mg by mouth daily       Cod Liver Oil 5000-500 UNIT/5ML OIL        Cyanocobalamin (VITAMIN B-12 CR) 1000 MCG TBCR        diclofenac (VOLTAREN) 1 % GEL topical gel Apply topically 2 times daily       Flaxseed, Linseed, (EQL FLAXSEED OIL) 1200 MG CAPS Take 1 capsule by mouth 3 times daily       hydrochlorothiazide (HYDRODIURIL) 25 MG tablet Take 25 mg by mouth daily       lecithin 400 MG CAPS Take 1 capsule by mouth 2 times daily       omega-3 acid ethyl esters (LOVAZA) 1 G capsule Take 1,000 mg by mouth       Potassium Chloride ER 20 MEQ TBCR Take 20 mEq by mouth daily with food       triamcinolone (KENALOG) 0.1 % cream Apply topically 2 times daily       vitamin E (TOCOPHEROL) 100 UNIT capsule          SOCIAL HISTORY:  Marital Status: .  Children: yes.  Occupation: Retired.  Alcohol use:Occassional.  Tobacco use: Smoker: no.  Are you or have you used illicit drugs:  no.    FAMILY HISTORY:  Family History   Problem Relation Age of Onset     DIABETES Father      Diabetes     Hypertension Father      Hypertension     HEART DISEASE Father      Heart Disease     Other - See Comments Father       hearing loss, kidney problems.     HEART  "DISEASE Mother      Heart Disease     Hypertension Mother      Hypertension     CANCER Maternal Grandmother      Cancer,bone cancer in her leg     Other - See Comments Maternal Grandmother      Psychiatric illness,depression     HEART DISEASE Son      Heart Disease,MI - first at age 48     Breast Cancer Sister      Cancer-breast     DIABETES Sister      Diabetes     HEART DISEASE Sister      Heart Disease,angioplasty CAD     Other - See Comments Sister      Right ear deafness - surgically repaired     Other - See Comments Sister      depression     DIABETES Sister      Diabetes     Hypertension Sister      Hypertension     Other - See Comments Sister      car accident age 75 with multiple fractures - in nursing home.     HEART DISEASE Brother      Heart Disease     HEART DISEASE Brother      Heart Disease     Hypertension Brother      Hypertension     Other - See Comments Brother      vertigo, also kidney problems.       REVIEW OF SYSTEMS:  The review of systems as documented in the HPI and on the intake questionnaire, completedby the patient on 3/5/2018 have been reviewed by myself and the pertinentpositives and negatives addressed.  The remainder of the complete review of systems was non-contributory.    PHYSICAL EXAM:   /86 (BP Location: Right arm, Patient Position: Sitting, Cuff Size: Adult Regular)  Pulse 72  Ht 1.499 m (4' 11\")  Wt 66.7 kg (147 lb)  BMI 29.69 kg/m2 Body mass index is 29.69 kg/(m^2).    General Appearance: Pleasant 67 year old female in good appearance, mood and affect.  Alert and orientated times three ( time, date and location).    Skin: Intact.    Shoulder:  Motion: Full active and passive forward flexion and abduction tightness with some internal and external rotation with increased pain which also limits her motion.  Hawkin's Sign: positive.  Neer's Sign: positive.  Cross body adduction:  positive.  Drop arm test: positive.  Weakness at waist level: positive.  Bicipital testing: " positive.  Lift off test:  negative.  Gill's test:positive.    Elbow:  Motion: Full motion.    Hand:  Sensation: Intact.  Radial and ulnar blood flow:  normal.    Eyes: Pupils are round and she wears glasses.    Ears: Hearing: Intact.    Heart: Good capillary refill and her hands pulses are regular.    Lungs: Clear.    Radiographic images from 2/26, 3/5 where independently reviewed and discussed with the patient.      Xray:     X-rays demonstrate narrowing of the acromioclavicular joint there is also a type II acromial hook and a small cyst in the humeral head.    PROCEDURE: XR SHOULDER RT G/E 3 VW 3/5/2018 9:58 AM    HISTORY: ; Disorder of bursae and tendons in shoulder region; Disorder  of bursae and tendons in shoulder region; Right shoulder pain,  unspecified chronicity    COMPARISONS: None.    TECHNIQUE: 3 views.    FINDINGS: There is mild hypertrophic change in the AC joint.    Acromiohumeral distance is mildly narrowed. No fracture or dislocation  is seen and there is no focal bone lesion.    IMPRESSION: Mild hypertrophic change AC joint.    JASON PRADO MD    MRI/MRA:     MR arthrogram does show a full-thickness tear as well as subluxation of the long head of the biceps with irritation into the subscapularis.    MR SHOULDER RIGHT WITHOUT CONTRAST February 26, 2018 at 1010 hours    CLINICAL HISTORY: Right shoulder pain, unspecified chronicity.  Shoulder pain for the past several months.    COMPARISON: MRI right shoulder 12/6/2012.    TECHNIQUE: Coronal T2 FAT-SAT, sagittal T2 FAT-SAT and T1, axial T2  FAT-SAT and gradient echo MR images of the shoulder were obtained.    FINDINGS:    Rotator cuff: There is a full-thickness tear of the supraspinatus  tendon anteriorly, measuring 1.4 cm in AP dimension by 7 mm in  transverse dimension. The more posterior supraspinatus fibers remain  intact. There is also moderate tendinosis of infraspinatus tendon.  There is moderate tendinosis of the subscapularis  tendon. Low signal  intensity foci within the distal fibers of the supraspinatus tendon  are again suggestive of calcific tendinosis. There is no muscle tear  or atrophy. There is a trace subacromial/subdeltoid bursal effusion.    Acromioclavicular joint: There are advanced degenerative changes of  the acromioclavicular joint with undersurface hypertrophy producing  mass effect on the rotator cuff.    Biceps tendon: There is moderate tendinosis of the biceps tendon in  the bicipital groove. No tear is seen.    Glenohumeral joint: There is diffuse hyperintensity of the superior  and posterior labrum, suggesting diffuse degeneration and nondisplaced  tearing. Thickening of the anterior labrum is unchanged. No  significant joint effusion. No acute fracture or dislocation. The  cartilage is slightly thinned but otherwise intact.    IMPRESSION:  1. Full-thickness supraspinatus tendon tear. Additional tendinosis and  calcific tendinosis of the remainder of the supraspinatus and  infraspinatus tendons.  2. Moderate subscapularis tendinosis without tear.  3. Moderate biceps tendinosis. No tear.  4. Degenerative disease of the acromioclavicular joint with  undersurface hypertrophy producing mass effect on the rotator cuff.  This may contribute to symptoms of impingement syndrome.    MARLYS SPENCER MD    IMPRESSION:  Right rotator cuff tear.  Right subluxed biceps with partial tear.  Right subscapularis partial tear.  Right AC arthritis.  Right impingement syndrome.    PLAN:  Risks, benefits, conservative, surgical and alternatives to treatment where discussed and the patient would like to proceed with consideration of surgery.  I did review the poster board, handout and model to help the patient better understand shoulder anatomy in her pathology she was given copies of the book.  She received preoperative instruction here today.  She will continue to work on her home range of motion exercises.  She would follow-up 1 week  after surgery.  Questions and concerns answered to her satisfaction.    I have discussed options with Arlyn Holder for the treatment for shoulder pathology, which have included observation, physical therapy, corticosteroid injection versus shoulder surgery. I discussed pros and cons of each approach, and at this point, Arlyn Holder would like to proceed with shoulder surgery. We discussed surgery would be an outpatient procedure, you would be able to go home following the surgery. We will plan on arthroscopic versus possible open procedure with anything else that needs to be done.  Surgical anesthesia would be general anesthesia with possible interscalene block to control pain following surgery.   I discussed following surgery Arlyn Holder would be in a sling for eight weeks following surgery with gentle motion of the elbow and wrist following surgery.  At four weeks following surgery  further motion and strengthening with the shoulder with physical therapy will commence and it is a step wise approach.     Full recovery from shoulder surgery may take a year. The goal will be pain relief. Complications were discussed including continued pain, stiffness in the shoulder, rare chance of neurovascular damage, potential chance of infection. If deep  infection were to occur, further surgery may be needed with repeat washout in the operating room with possible need for treatment with antibiotics.    If tolerated use of of an EC-ASA 325mg is recommended for 30 days after surgery.    Risks, benefits, conservative, surgical, and alternatives of treatment were thoroughly outlined. Noguarantees were given. Risks which do include, but are not limited to:  Scar, infection, decreased motion, damage to blood vessels, nerves and tendons, failure or need for further treatment, reaction to medications andanesthesia, blood clots, and the possibility of death where discussed.  She did verbalize an understanding of the above and  "that if a biceps tenotomy is performed they would have a \"vipul sign\" since the longhead of the biceps would sit lower.  All questions and concerns were addressed.    Patient is set up for right shoulder arthroscopy with SAD,DCE, rotator cuff repair, biceps tenotomy and a block if it is felt appropriate after discussing with anesthesia.    Arlyn Holder will need pre op clearance for management of pulmonary and cardiac evaluation and treatment of hypertension prior to surgery.    Follow up after surgery will be 3-7 days.    All questions where answered to the patients satisfaction.    Wilner Painting D.O.  Orthopaedic Surgeon    [address] [address]  Phone [phone] (KNEE)  Fax [phone]    Disclaimer:  This note consists of words and symbols derived from keyboarding, dictation, or using voice recognition software. As a result, there may be errors in the script that have gone undetected. Please consider this when interpreting information found in this note.    11:00 AM 3/5/2018    "

## 2018-03-05 NOTE — MR AVS SNAPSHOT
"              After Visit Summary   3/5/2018    Arlyn Holder    MRN: [id number]           Patient Information     Date Of Birth          1950        Visit Information        Provider Department      3/5/2018 9:45 AM Wilner Painting DO [facility]        Today's Diagnoses     Complete tear of right rotator cuff    -  1    Rotator cuff tear arthropathy of right shoulder        Arthritis of right acromioclavicular joint        Impingement syndrome of right shoulder        AC (acromioclavicular) arthritis           Follow-ups after your visit        Follow-up notes from your care team     Return in about 1 week (around 3/12/2018).      Your next 10 appointments already scheduled     Mar 29, 2018  9:45 AM CDT   PHYSICAL with Brooke Castaneda MD   [facility] ([facility])    [address]  [facility] [id number] [phone]              Who to contact     If you have questions or need follow up information about today's clinic visit or your schedule please contact [facility] directly at [phone].  Normal or non-critical lab and imaging results will be communicated to you by [employer], letter or phone within 4 business days after the clinic has received the results. If you do not hear from us within 7 days, please contact the clinic through [employer] or phone. If you have a critical or abnormal lab result, we will notify you by phone as soon as possible.  Submit refill requests through [employer] or call your pharmacy and they will forward the refill request to us. Please allow 3 business days for your refill to be completed.          Additional Information About Your Visit        [employer] Information     [employer] lets you send messages to your doctor, view your test results, renew your prescriptions, schedule appointments and more. To sign up, go to [email or web address] . Click on \"Log in\" on the left side of " "the screen, which will take you to the Welcome page. Then click on \"Sign up Now\" on the right side of the page.     You will be asked to enter the access code listed below, as well as some personal information. Please follow the directions to create your username and password.     Your access code is: [email or web address]  Expires: 2018 11:53 AM     Your access code will  in 90 days. If you need help or a new code, please call your [facility] clinic or [phone].        Care EveryWhere ID     This is your Care EveryWhere ID. This could be used by other organizations to access your [facility] medical records  [id number]        Your Vitals Were     Pulse Height BMI (Body Mass Index)             72 1.499 m (4' 11\") 29.69 kg/m2          Blood Pressure from Last 3 Encounters:   18 136/86   18 122/78   18 128/80    Weight from Last 3 Encounters:   18 66.7 kg (147 lb)   18 66.7 kg (147 lb)   18 66.7 kg (147 lb)              Today, you had the following     No orders found for display       Primary Care Provider Office Phone # Fax #    Brooke Anastasiya Castaneda [phone] [address] [facility] [address]        Equal Access to Services     [facility]: Hadii hawa Ruggiero, waaxda luede, qaybta kaalángela garcia, aruna carranza . So [facility] [phone].    ATENCIÓN: Si habla español, tiene a frank disposición servicios gratuitos de asistencia lingüística. Kirstin al [phone].    We comply with applicable federal civil rights laws and [place] laws. We do not discriminate on the basis of race, color, national origin, age, disability, sex, sexual orientation, or gender identity.            Thank you!     Thank you for choosing [facility] AND [facility]  for your care. Our goal is always to provide you with excellent care. Hearing back from our patients is one way we can continue to improve our services. " Please take a few minutes to complete the written survey that you may receive in the mail after your visit with us. Thank you!             Your Updated Medication List - Protect others around you: Learn how to safely use, store and throw away your medicines at www.disposemymeds.org.          This list is accurate as of 3/5/18 11:06 AM.  Always use your most recent med list.                   Brand Name Dispense Instructions for use Diagnosis    atorvastatin 20 MG tablet    LIPITOR     Take 20 mg by mouth daily        Cod Liver Oil 5000-500 UNIT/5ML Oil           diclofenac 1 % Gel topical gel    VOLTAREN     Apply topically 2 times daily        EQL FLAXSEED OIL 1200 MG Caps      Take 1 capsule by mouth 3 times daily        hydrochlorothiazide 25 MG tablet    HYDRODIURIL     Take 25 mg by mouth daily        lecithin 400 MG Caps      Take 1 capsule by mouth 2 times daily        omega-3 acid ethyl esters 1 G capsule    Lovaza     Take 1,000 mg by mouth        Potassium Chloride ER 20 MEQ Tbcr      Take 20 mEq by mouth daily with food        triamcinolone 0.1 % cream    KENALOG     Apply topically 2 times daily        Vitamin B-12 CR 1000 MCG Tbcr           vitamin E 100 UNIT capsule    TOCOPHEROL

## 2018-03-07 RX ORDER — POTASSIUM CHLORIDE 750 MG/1
TABLET, EXTENDED RELEASE ORAL
Qty: 90 TABLET | OUTPATIENT
Start: 2018-03-07

## 2018-03-29 ENCOUNTER — OFFICE VISIT (OUTPATIENT)
Dept: FAMILY MEDICINE | Facility: OTHER | Age: 68
End: 2018-03-29
Attending: FAMILY MEDICINE
Payer: MEDICARE

## 2018-03-29 VITALS
DIASTOLIC BLOOD PRESSURE: 82 MMHG | SYSTOLIC BLOOD PRESSURE: 120 MMHG | HEART RATE: 75 BPM | TEMPERATURE: 97.5 F | OXYGEN SATURATION: 97 % | WEIGHT: 146 LBS | HEIGHT: 59 IN | BODY MASS INDEX: 29.43 KG/M2

## 2018-03-29 DIAGNOSIS — K21.9 GASTROESOPHAGEAL REFLUX DISEASE WITHOUT ESOPHAGITIS: ICD-10-CM

## 2018-03-29 DIAGNOSIS — Z01.818 PREOP GENERAL PHYSICAL EXAM: Primary | ICD-10-CM

## 2018-03-29 DIAGNOSIS — I10 ESSENTIAL HYPERTENSION: ICD-10-CM

## 2018-03-29 DIAGNOSIS — G89.29 CHRONIC PAIN OF LEFT KNEE: ICD-10-CM

## 2018-03-29 DIAGNOSIS — M75.101 ROTATOR CUFF TEAR ARTHROPATHY OF RIGHT SHOULDER: ICD-10-CM

## 2018-03-29 DIAGNOSIS — M25.562 CHRONIC PAIN OF LEFT KNEE: ICD-10-CM

## 2018-03-29 DIAGNOSIS — E78.00 PURE HYPERCHOLESTEROLEMIA: ICD-10-CM

## 2018-03-29 DIAGNOSIS — M12.811 ROTATOR CUFF TEAR ARTHROPATHY OF RIGHT SHOULDER: ICD-10-CM

## 2018-03-29 DIAGNOSIS — Z12.31 ENCOUNTER FOR SCREENING MAMMOGRAM FOR BREAST CANCER: ICD-10-CM

## 2018-03-29 DIAGNOSIS — I77.810 ASCENDING AORTA DILATATION (H): ICD-10-CM

## 2018-03-29 DIAGNOSIS — Z00.00 HEALTH CARE MAINTENANCE: ICD-10-CM

## 2018-03-29 LAB
ALBUMIN SERPL-MCNC: 4.4 G/DL (ref 3.5–5.7)
ALP SERPL-CCNC: 65 U/L (ref 34–104)
ALT SERPL W P-5'-P-CCNC: 17 U/L (ref 7–52)
ANION GAP SERPL CALCULATED.3IONS-SCNC: 8 MMOL/L (ref 3–14)
AST SERPL W P-5'-P-CCNC: 18 U/L (ref 13–39)
BASOPHILS # BLD AUTO: 0.1 10E9/L (ref 0–0.2)
BASOPHILS NFR BLD AUTO: 1 %
BILIRUB SERPL-MCNC: 0.8 MG/DL (ref 0.3–1)
BUN SERPL-MCNC: 23 MG/DL (ref 7–25)
CALCIUM SERPL-MCNC: 9.8 MG/DL (ref 8.6–10.3)
CHLORIDE SERPL-SCNC: 103 MMOL/L (ref 98–107)
CHOLEST SERPL-MCNC: 126 MG/DL
CO2 SERPL-SCNC: 28 MMOL/L (ref 21–31)
CREAT SERPL-MCNC: 0.89 MG/DL (ref 0.6–1.2)
DIFFERENTIAL METHOD BLD: NORMAL
EOSINOPHIL # BLD AUTO: 0.3 10E9/L (ref 0–0.7)
EOSINOPHIL NFR BLD AUTO: 4.3 %
ERYTHROCYTE [DISTWIDTH] IN BLOOD BY AUTOMATED COUNT: 13.2 % (ref 10–15)
GFR SERPL CREATININE-BSD FRML MDRD: 63 ML/MIN/1.7M2
GLUCOSE SERPL-MCNC: 100 MG/DL (ref 70–105)
HCT VFR BLD AUTO: 40.6 % (ref 35–47)
HDLC SERPL-MCNC: 54 MG/DL (ref 23–92)
HGB BLD-MCNC: 13.6 G/DL (ref 11.7–15.7)
IMM GRANULOCYTES # BLD: 0 10E9/L (ref 0–0.4)
IMM GRANULOCYTES NFR BLD: 0.2 %
LDLC SERPL CALC-MCNC: 58 MG/DL
LYMPHOCYTES # BLD AUTO: 1.7 10E9/L (ref 0.8–5.3)
LYMPHOCYTES NFR BLD AUTO: 28.6 %
MCH RBC QN AUTO: 29.5 PG (ref 26.5–33)
MCHC RBC AUTO-ENTMCNC: 33.5 G/DL (ref 31.5–36.5)
MCV RBC AUTO: 88 FL (ref 78–100)
MONOCYTES # BLD AUTO: 0.7 10E9/L (ref 0–1.3)
MONOCYTES NFR BLD AUTO: 11.3 %
NEUTROPHILS # BLD AUTO: 3.3 10E9/L (ref 1.6–8.3)
NEUTROPHILS NFR BLD AUTO: 54.6 %
NONHDLC SERPL-MCNC: 72 MG/DL
PLATELET # BLD AUTO: 266 10E9/L (ref 150–450)
POTASSIUM SERPL-SCNC: 3.6 MMOL/L (ref 3.5–5.1)
PROT SERPL-MCNC: 7.4 G/DL (ref 6.4–8.9)
RBC # BLD AUTO: 4.61 10E12/L (ref 3.8–5.2)
SODIUM SERPL-SCNC: 139 MMOL/L (ref 134–144)
TRIGL SERPL-MCNC: 69 MG/DL
TSH SERPL DL<=0.05 MIU/L-ACNC: 2.44 IU/ML (ref 0.34–5.6)
WBC # BLD AUTO: 6.1 10E9/L (ref 4–11)

## 2018-03-29 PROCEDURE — 80061 LIPID PANEL: CPT | Performed by: FAMILY MEDICINE

## 2018-03-29 PROCEDURE — G0463 HOSPITAL OUTPT CLINIC VISIT: HCPCS

## 2018-03-29 PROCEDURE — 99214 OFFICE O/P EST MOD 30 MIN: CPT | Performed by: FAMILY MEDICINE

## 2018-03-29 PROCEDURE — G0463 HOSPITAL OUTPT CLINIC VISIT: HCPCS | Mod: 25

## 2018-03-29 PROCEDURE — 36415 COLL VENOUS BLD VENIPUNCTURE: CPT | Performed by: FAMILY MEDICINE

## 2018-03-29 PROCEDURE — 80053 COMPREHEN METABOLIC PANEL: CPT | Performed by: FAMILY MEDICINE

## 2018-03-29 PROCEDURE — 84443 ASSAY THYROID STIM HORMONE: CPT | Performed by: FAMILY MEDICINE

## 2018-03-29 PROCEDURE — 85025 COMPLETE CBC W/AUTO DIFF WBC: CPT | Performed by: FAMILY MEDICINE

## 2018-03-29 RX ORDER — POTASSIUM CHLORIDE 1500 MG/1
20 TABLET, EXTENDED RELEASE ORAL
Qty: 90 TABLET | Refills: 3 | Status: SHIPPED | OUTPATIENT
Start: 2018-03-29 | End: 2019-11-14

## 2018-03-29 RX ORDER — HYDROCHLOROTHIAZIDE 25 MG/1
25 TABLET ORAL DAILY
Qty: 90 TABLET | Refills: 3 | Status: SHIPPED | OUTPATIENT
Start: 2018-03-29 | End: 2019-04-01

## 2018-03-29 RX ORDER — ATORVASTATIN CALCIUM 20 MG/1
20 TABLET, FILM COATED ORAL DAILY
Qty: 90 TABLET | Refills: 3 | Status: SHIPPED | OUTPATIENT
Start: 2018-03-29 | End: 2019-03-18

## 2018-03-29 ASSESSMENT — PAIN SCALES - GENERAL: PAINLEVEL: MILD PAIN (2)

## 2018-03-29 NOTE — LETTER
Arlyn Holder  [address]   [facility] [address]    3/29/2018      Dear Ms. Holder,      I wanted to let you know about your recent labs.  All of your results were in good range.    Please contact us at [phone] with any questions or concerns that you have.    I have attached your lab results for your records.        Sincerely,         Brooke Castaneda     Resulted Orders   Lipid Profile   Result Value Ref Range    Cholesterol 126 <200 mg/dL    Triglycerides 69 <150 mg/dL    HDL Cholesterol 54 23 - 92 mg/dL    LDL Cholesterol Calculated 58 <100 mg/dL      Comment:      Desirable:       <100 mg/dl    Non HDL Cholesterol 72 <130 mg/dL   Comprehensive metabolic panel   Result Value Ref Range    Sodium 139 134 - 144 mmol/L    Potassium 3.6 3.5 - 5.1 mmol/L    Chloride 103 98 - 107 mmol/L    Carbon Dioxide 28 21 - 31 mmol/L    Anion Gap 8 3 - 14 mmol/L    Glucose 100 70 - 105 mg/dL    Urea Nitrogen 23 7 - 25 mg/dL    Creatinine 0.89 0.60 - 1.20 mg/dL    GFR Estimate 63 >60 mL/min/1.7m2    GFR Estimate If Black 76 >60 mL/min/1.7m2    Calcium 9.8 8.6 - 10.3 mg/dL    Bilirubin Total 0.8 0.3 - 1.0 mg/dL    Albumin 4.4 3.5 - 5.7 g/dL    Protein Total 7.4 6.4 - 8.9 g/dL    Alkaline Phosphatase 65 34 - 104 U/L    ALT 17 7 - 52 U/L    AST 18 13 - 39 U/L   TSH GH   Result Value Ref Range    Thyrotropin 2.44 0.34 - 5.60 IU/mL   CBC with platelets and differential   Result Value Ref Range    WBC 6.1 4.0 - 11.0 10e9/L    RBC Count 4.61 3.8 - 5.2 10e12/L    Hemoglobin 13.6 11.7 - 15.7 g/dL    Hematocrit 40.6 35.0 - 47.0 %    MCV 88 78 - 100 fl    MCH 29.5 26.5 - 33.0 pg    MCHC 33.5 31.5 - 36.5 g/dL    RDW 13.2 10.0 - 15.0 %    Platelet Count 266 150 - 450 10e9/L    Diff Method Automated Method     % Neutrophils 54.6 %    % Lymphocytes 28.6 %    % Monocytes 11.3 %    % Eosinophils 4.3 %    % Basophils 1.0 %    % Immature Granulocytes 0.2 %    Absolute Neutrophil 3.3 1.6 - 8.3 10e9/L    Absolute Lymphocytes 1.7 0.8 -  5.3 10e9/L    Absolute Monocytes 0.7 0.0 - 1.3 10e9/L    Absolute Eosinophils 0.3 0.0 - 0.7 10e9/L    Absolute Basophils 0.1 0.0 - 0.2 10e9/L    Abs Immature Granulocytes 0.0 0 - 0.4 10e9/L

## 2018-03-29 NOTE — PATIENT INSTRUCTIONS
Before Your Surgery      Call your surgeon if there is any change in your health. This includes signs of a cold or flu (such as a sore throat, runny nose, cough, rash or fever).    Do not smoke, drink alcohol or take over the counter medicine (unless your surgeon or primary care doctor tells you to) for the 24 hours before and after surgery.    If you take prescribed drugs: Follow your doctor s orders about which medicines to take and which to stop until after surgery.    Eating and drinking prior to surgery: follow the instructions from your surgeon    Take a shower or bath the night before surgery. Use the soap your surgeon gave you to gently clean your skin. If you do not have soap from your surgeon, use your regular soap. Do not shave or scrub the surgery site.  Wear clean pajamas and have clean sheets on your bed.     Do not use aspirin, ibuprofen or aleve (naproxen) for at least a week prior to your surgery.  It's ok to take tylenol if needed for pain.    The morning of surgery, take your potassium supplement and your hydrochlorothiazide only.  Hold everything else until later in the day.

## 2018-03-29 NOTE — PROGRESS NOTES
[facility]  [address]  [place] [address]  [phone]    PRE-OP EVALUATION:  Today's date: 3/29/2018    Arlyn Holder (: 1950) presents for pre-operative evaluation assessment as requested by Dr. Painting.  She requires evaluation and anesthesia risk assessment prior to undergoing surgery/procedure for treatment of right shoulder arthroplasty .    Proposed Surgery/ Procedure: right shoulder arthroscopy and rotator cuff repair   Date of Surgery/ Procedure: 18  Time of Surgery/ Procedure: unknown  Hospital/Surgical Facility: [facility]  Fax number for surgical facility: [phone]  Primary Physician: Brooke Castaneda  Type of Anesthesia Anticipated: to be determined    Patient has a Health Care Directive or Living Will:  YES     1. NO - Do you have a history of heart attack, stroke, stent, bypass or surgery on an artery in the head, neck, heart or legs?  2. NO - Do you ever have any pain or discomfort in your chest?  3. NO - Do you have a history of  Heart Failure?  4. NO - Are you troubled by shortness of breath when: walking on the level, up a slight hill or at night?  5. NO - Do you currently have a cold, bronchitis or other respiratory infection?  6. NO - Do you have a cough, shortness of breath or wheezing?  7. yes - Do you sometimes get pains in the calves of your legs when you walk? Left calf related to her osteoarthritis of knee.  8. NO - Do you or anyone in your family have previous history of blood clots?  9. NO - Do you or does anyone in your family have a serious bleeding problem such as prolonged bleeding following surgeries or cuts?  10. NO - Have you ever had problems with anemia or been told to take iron pills?  11. NO - Have you had any abnormal blood loss such as black, tarry or bloody stools, or abnormal vaginal bleeding?  12. NO - Have you ever had a blood transfusion?  13. yes - Have you or any of your relatives ever had  problems with anesthesia?  Has had vomiting with coming out of anesthesia in past.  14. NO - Do you have sleep apnea, excessive snoring or daytime drowsiness?  15. NO - Do you have any prosthetic heart valves?  16. NO - Do you have prosthetic joints?  17. NO - Is there any chance that you may be pregnant?      HPI:     HPI related to upcoming procedure: Arlyn has been having a lot of pain in her right shoulder, particularly when she lays on that side.  She was found to have a full-thickness tear of her supraspinatus.  She also has degenerative disease of the AC joint causing impingement.    Arlyn is also here for follow-up of chronic medical problems as well.  Had a lot of pain in her left knee as well.  This pain extends into her left calf.  She was noted on MRI several months ago to have a large Baker's cyst that is leaking.  Discussed that the pain that radiates into her calf is likely from this Baker's cyst.    She also was noted to have a small dilation of her ascending aorta on CT calcium score 2 years ago.  We discussed follow-up with echo.      MEDICAL HISTORY:     Patient Active Problem List    Diagnosis Date Noted     Complete tear of right rotator cuff 03/01/2018     Priority: Medium     Impingement syndrome of right shoulder 03/01/2018     Priority: Medium     AC (acromioclavicular) arthritis 03/01/2018     Priority: Medium     Enthesopathy of hip region 02/15/2018     Priority: Medium     Degeneration of cervical intervertebral disc 02/15/2018     Priority: Medium     Overview:   Multilevel degenerative disk disease in cervical lumbar spine without   radiculopathy.  Add also possible right cubital tunnel syndrome at elbow.       Chondromalacia of patella 02/15/2018     Priority: Medium     Hypertension 02/15/2018     Priority: Medium     Enthesopathy of ankle and tarsus 02/15/2018     Priority: Medium     Symptomatic menopausal or female climacteric states 02/15/2018     Priority: Medium     Overview:    Postmenopausal now off hormone replacement therapy.       Acquired deformity of ankle and foot 02/15/2018     Priority: Medium     Other joint derangement, not elsewhere classified, unspecified site 02/15/2018     Priority: Medium     Overview:   preloader comment: selections available to preload differs from paper chart.    *Subluxation L3-L4 with mild stenosis       Uterine prolapse 02/15/2018     Priority: Medium     Overview:   preloader comment: selections available to preload differs from paper chart.    *Pelvic floor relaxation       Ascending aorta dilatation (H) 03/23/2016     Priority: Medium     Overview:   3.5-3.6 cm on CT 3/2016 - follow up in 1 year       Health care maintenance 03/12/2015     Priority: Medium     Arthritis of right wrist 11/07/2013     Priority: Medium     GERD (gastroesophageal reflux disease) 12/03/2012     Priority: Medium     Tendonitis of wrist, right 12/03/2012     Priority: Medium     Hyperlipidemia 10/04/2011     Priority: Medium     Osteoarthritis of ankle or foot 10/04/2011     Priority: Medium     Plantar fasciitis 10/04/2011     Priority: Medium     Disorder of skin or subcutaneous tissue 10/04/2011     Priority: Medium     Carpal tunnel syndrome 11/18/2010     Priority: Medium     Disorder of bursae and tendons in shoulder region 11/18/2010     Priority: Medium     Lesion of lateral popliteal nerve 10/11/2006     Priority: Medium     Cervical spondylosis without myelopathy 10/04/2006     Priority: Medium     Lumbosacral spondylosis without myelopathy 10/04/2006     Priority: Medium      Past Medical History:   Diagnosis Date     AC (acromioclavicular) arthritis 3/1/2018     Bunion of great toe of right foot     No Comments Provided     Carpal tunnel syndrome     11/18/2010     Complete tear of right rotator cuff 3/1/2018     Essential (primary) hypertension     No Comments Provided     Gastro-esophageal reflux disease without esophagitis     12/3/2012     Hyperlipidemia      10/4/2011     Impingement syndrome of right shoulder     10/09/07     Personal history of other medical treatment (CODE)      3, Para 3 with three vaginal deliveries     Thoracic aortic ectasia (H)     3/23/2016,3.5-3.6 cm on CT 3/2016 - follow up in 1 year     Uterovaginal prolapse     preloader comment: selections available to preload differs from paper chart.   *Pelvic floor relaxation     Past Surgical History:   Procedure Laterality Date     BUNIONECTOMY      ,right     COLONOSCOPY      , normal     COLONOSCOPY      3/16/15,F/U      HYSTERECTOMY VAGINAL      ,for uterine prolapse with A&P repair.  Tube and ovaries still in place.     LAPAROSCOPY DIAGNOSTIC (GYN)      ,for pelvic adhesion     LAPAROSCOPY DIAGNOSTIC (GYN)      ,vaginal vault prolapse - Dr. Mumtaz Marshall - anterior/posterior repair with bladder mesh.     RELEASE CARPAL TUNNEL      Dr. Méndez     Current Outpatient Prescriptions   Medication Sig Dispense Refill     aspirin (ASPIRIN LOW DOSE) 81 MG tablet Take by mouth daily 30 tablet      Multiple Vitamins-Minerals (MENS MULTIVITAMIN PLUS) TABS  30 tablet      Calcium-Vitamin D-Vitamin K (CALCIUM + D) 500-1000-40 MG-UNT-MCG CHEW        atorvastatin (LIPITOR) 20 MG tablet Take 1 tablet (20 mg) by mouth daily 90 tablet 3     hydrochlorothiazide (HYDRODIURIL) 25 MG tablet Take 1 tablet (25 mg) by mouth daily 90 tablet 3     Potassium Chloride ER 20 MEQ TBCR Take 1 tablet (20 mEq) by mouth daily with food 90 tablet 3     Cod Liver Oil 5000-500 UNIT/5ML OIL        Cyanocobalamin (VITAMIN B-12 CR) 1000 MCG TBCR        diclofenac (VOLTAREN) 1 % GEL topical gel Apply topically 2 times daily       Flaxseed, Linseed, (EQL FLAXSEED OIL) 1200 MG CAPS Take 1 capsule by mouth 3 times daily       lecithin 400 MG CAPS Take 1 capsule by mouth 2 times daily       omega-3 acid ethyl esters (LOVAZA) 1 G capsule Take 1,000 mg by mouth       triamcinolone (KENALOG) 0.1 % cream Apply  "topically 2 times daily       vitamin E (TOCOPHEROL) 100 UNIT capsule        [DISCONTINUED] atorvastatin (LIPITOR) 20 MG tablet Take 20 mg by mouth daily       [DISCONTINUED] hydrochlorothiazide (HYDRODIURIL) 25 MG tablet Take 25 mg by mouth daily       OTC products: Aspirin and NSAIDS    Allergies   Allergen Reactions     Codeine Nausea and Vomiting     Diazepam      Other reaction(s): Hallucinations     Naproxen Other (See Comments)     Mouth sores     Sulfa Drugs Unknown     Sulfacetamide Rash     Had a reaction to sulfa drug but is not sure which one it was-states the reaction resulted in redness      Latex Allergy: NO    Social History   Substance Use Topics     Smoking status: Passive Smoke Exposure - Never Smoker     Smokeless tobacco: Never Used      Comment: Quit smoking:  was a smoker     Alcohol use No     History   Drug Use Not on file     Comment: Drug use: No       REVIEW OF SYSTEMS:   CONSTITUTIONAL: NEGATIVE for fever, chills, change in weight  INTEGUMENTARY/SKIN: NEGATIVE for worrisome rashes, moles or lesions  EYES: NEGATIVE for vision changes or irritation  ENT/MOUTH: NEGATIVE for ear, mouth and throat problems  RESP: NEGATIVE for significant cough or SOB  BREAST: NEGATIVE for masses, tenderness or discharge  CV: NEGATIVE for chest pain, palpitations or peripheral edema  GI: NEGATIVE for nausea, abdominal pain, heartburn, or change in bowel habits  : NEGATIVE for frequency, dysuria, or hematuria  MUSCULOSKELETAL: Joint pains as noted above.  NEURO: NEGATIVE for weakness, dizziness or paresthesias  ENDOCRINE: NEGATIVE for temperature intolerance, skin/hair changes  HEME: NEGATIVE for bleeding problems  PSYCHIATRIC: NEGATIVE for changes in mood or affect    EXAM:   /82 (BP Location: Right arm, Patient Position: Sitting, Cuff Size: Adult Regular)  Pulse 75  Temp 97.5  F (36.4  C) (Tympanic)  Ht 4' 10.5\" (1.486 m)  Wt 146 lb (66.2 kg)  SpO2 97%  BMI 29.99 kg/m2    GENERAL " APPEARANCE: healthy, alert and no distress     EYES: EOMI, PERRL     HENT: ear canals and TM's normal and nose and mouth without ulcers or lesions     NECK: no adenopathy, no asymmetry, masses, or scars and thyroid normal to palpation     RESP: lungs clear to auscultation - no rales, rhonchi or wheezes     CV: regular rates and rhythm, normal S1 S2, no S3 or S4 and no murmur, click or rub     BREAST: No masses palpable.  No skin changes, tethering or axillary lymphadenopathy noted.     ABDOMEN:  soft, nontender, no HSM or masses and bowel sounds normal     MS: extremities normal- no gross deformities noted.  Swelling of left knee noted.  Tenderness in the popliteal fossa region.     SKIN: no suspicious lesions or rashes     NEURO: Normal strength and tone, sensory exam grossly normal, mentation intact and speech normal     PSYCH: mentation appears normal. and affect normal/bright     LYMPHATICS: No cervical adenopathy    DIAGNOSTICS:   EKG: Notes Recorded by Brooke Castaneda MD on 6/16/2017 at 3:43 PM Discussed in clinic. Brooke Castaneda MD ....................  6/16/2017   3:43 PM ------  Notes Recorded by Katarina Tinoco DO on 6/16/2017 at 1:35 PM EKG Interpretation: Rhythm:   Sinus bradycardia Rate:  59 Axis: Normal QRS interval: Normal Conduction: Normal ST Segments: Normal QT interval: Normal APC's Present: No VPC's Present: No  Impression: Normal EKG.  When compared to previous tracing dated March 9, 2016, unchanged other   than rate.  Katarina Tinoco DO    Results for orders placed or performed in visit on 03/29/18   Lipid Profile   Result Value Ref Range    Cholesterol 126 <200 mg/dL    Triglycerides 69 <150 mg/dL    HDL Cholesterol 54 23 - 92 mg/dL    LDL Cholesterol Calculated 58 <100 mg/dL    Non HDL Cholesterol 72 <130 mg/dL   Comprehensive metabolic panel   Result Value Ref Range    Sodium 139 134 - 144 mmol/L    Potassium 3.6 3.5 - 5.1 mmol/L    Chloride 103 98 - 107 mmol/L    Carbon Dioxide 28  21 - 31 mmol/L    Anion Gap 8 3 - 14 mmol/L    Glucose 100 70 - 105 mg/dL    Urea Nitrogen 23 7 - 25 mg/dL    Creatinine 0.89 0.60 - 1.20 mg/dL    GFR Estimate 63 >60 mL/min/1.7m2    GFR Estimate If Black 76 >60 mL/min/1.7m2    Calcium 9.8 8.6 - 10.3 mg/dL    Bilirubin Total 0.8 0.3 - 1.0 mg/dL    Albumin 4.4 3.5 - 5.7 g/dL    Protein Total 7.4 6.4 - 8.9 g/dL    Alkaline Phosphatase 65 34 - 104 U/L    ALT 17 7 - 52 U/L    AST 18 13 - 39 U/L   TSH GH   Result Value Ref Range    Thyrotropin 2.44 0.34 - 5.60 IU/mL   CBC with platelets and differential   Result Value Ref Range    WBC 6.1 4.0 - 11.0 10e9/L    RBC Count 4.61 3.8 - 5.2 10e12/L    Hemoglobin 13.6 11.7 - 15.7 g/dL    Hematocrit 40.6 35.0 - 47.0 %    MCV 88 78 - 100 fl    MCH 29.5 26.5 - 33.0 pg    MCHC 33.5 31.5 - 36.5 g/dL    RDW 13.2 10.0 - 15.0 %    Platelet Count 266 150 - 450 10e9/L    Diff Method Automated Method     % Neutrophils 54.6 %    % Lymphocytes 28.6 %    % Monocytes 11.3 %    % Eosinophils 4.3 %    % Basophils 1.0 %    % Immature Granulocytes 0.2 %    Absolute Neutrophil 3.3 1.6 - 8.3 10e9/L    Absolute Lymphocytes 1.7 0.8 - 5.3 10e9/L    Absolute Monocytes 0.7 0.0 - 1.3 10e9/L    Absolute Eosinophils 0.3 0.0 - 0.7 10e9/L    Absolute Basophils 0.1 0.0 - 0.2 10e9/L    Abs Immature Granulocytes 0.0 0 - 0.4 10e9/L                                                                              IMPRESSION:   Reason for surgery/procedure: Right rotator cuff tear  Diagnosis/reason for consult: Hypertension    The proposed surgical procedure is considered INTERMEDIATE risk.    REVISED CARDIAC RISK INDEX  The patient has the following serious cardiovascular risks for perioperative complications such as (MI, PE, VFib and 3  AV Block):  No serious cardiac risks  INTERPRETATION: 1 risks: Class II (low risk - 0.9% complication rate)    The patient has the following additional risks for perioperative complications:  No identified additional risks       ICD-10-CM    1. Preop general physical exam Z01.818    2. Rotator cuff tear arthropathy of right shoulder M12.811 PHYSICAL THERAPY REFERRAL   3. Chronic pain of left knee M25.562 PHYSICAL THERAPY REFERRAL    G89.29    4. Ascending aorta dilatation (H) I77.810 Echocardiogram Complete   5. Gastroesophageal reflux disease without esophagitis K21.9 CBC with platelets and differential   6. Pure hypercholesterolemia E78.00 Lipid Profile     Comprehensive metabolic panel     atorvastatin (LIPITOR) 20 MG tablet   7. Essential hypertension I10 TSH GH     hydrochlorothiazide (HYDRODIURIL) 25 MG tablet     Potassium Chloride ER 20 MEQ TBCR   8. Health care maintenance Z00.00    9. Encounter for screening mammogram for breast cancer Z12.31 MA Screening Digital Bilateral       RECOMMENDATIONS:     Do not use aspirin, ibuprofen or aleve (naproxen) for at least a week prior to your surgery.  It's ok to take tylenol if needed for pain.    The morning of surgery, take your potassium supplement and your hydrochlorothiazide only.  Hold everything else until later in the day.      APPROVAL GIVEN to proceed with proposed procedure, without further diagnostic evaluation     3.  With her rotator cuff tear and her significant arthritis in her left leg, she is worried about how she is going to get up from seated position and out of bed if her right arm is in a sling.  I did refer her to physical therapy.  Hopefully they can see her prior to her surgery to help her make plans.  4.  Repeat echo ordered to recheck this.  5.  Recheck CBC.  6.  Labs as above.  Atorvastatin refilled.  7.  Blood pressure is stable.  Check TSH as above.  HCTZ and potassium chloride were refilled.  8.  Mammogram as noted below.  She is due for a DEXA scan later this year and will address this at next years physical.  Colonoscopy is up-to-date.  Pap deferred due to age greater than 65 and previous hysterectomy.  Vaccines are up-to-date.  9.  Mammogram ordered as  above.  Want to do this in about 6 months to allow for time for her shoulder to heal.  Signed Electronically by: Brooke Castaneda MD    Copy of this evaluation report is provided to requesting physician.    [place] [email or web address] [email or web address]

## 2018-03-29 NOTE — MR AVS SNAPSHOT
After Visit Summary   3/29/2018    Arlyn Holder    MRN: [id number]           Patient Information     Date Of Birth          1950        Visit Information        Provider Department      3/29/2018 9:45 AM Brooke Castaneda MD [facility]        Today's Diagnoses     Preop general physical exam    -  1    Ascending aorta dilatation (H)        Gastroesophageal reflux disease without esophagitis        Pure hypercholesterolemia        Essential hypertension        Health care maintenance        Encounter for screening mammogram for breast cancer          Care Instructions      Before Your Surgery      Call your surgeon if there is any change in your health. This includes signs of a cold or flu (such as a sore throat, runny nose, cough, rash or fever).    Do not smoke, drink alcohol or take over the counter medicine (unless your surgeon or primary care doctor tells you to) for the 24 hours before and after surgery.    If you take prescribed drugs: Follow your doctor s orders about which medicines to take and which to stop until after surgery.    Eating and drinking prior to surgery: follow the instructions from your surgeon    Take a shower or bath the night before surgery. Use the soap your surgeon gave you to gently clean your skin. If you do not have soap from your surgeon, use your regular soap. Do not shave or scrub the surgery site.  Wear clean pajamas and have clean sheets on your bed.     Do not use aspirin, ibuprofen or aleve (naproxen) for at least a week prior to your surgery.  It's ok to take tylenol if needed for pain.    The morning of surgery, take your potassium supplement and your hydrochlorothiazide only.  Hold everything else until later in the day.          Follow-ups after your visit        Your next 10 appointments already scheduled     Apr 11, 2018   Procedure with Wilner Painting DO   [facility] [place] [facility]    [address]  [place] [address]   [phone]            2018 10:15 AM CDT   Return Visit with Wilner Painting DO   [facility] ([facility])    [address]  [place] [address]   [phone]              Future tests that were ordered for you today     Open Future Orders        Priority Expected Expires Ordered    MA Screening Digital Bilateral Routine  3/29/2019 3/29/2018    Echocardiogram Complete Routine  3/29/2019 3/29/2018            Who to contact     If you have questions or need follow up information about today's clinic visit or your schedule please contact [facility] directly at [phone].  Normal or non-critical lab and imaging results will be communicated to you by [employer], letter or phone within 4 business days after the clinic has received the results. If you do not hear from us within 7 days, please contact the clinic through [employer] or phone. If you have a critical or abnormal lab result, we will notify you by phone as soon as possible.  Submit refill requests through [employer] or call your pharmacy and they will forward the refill request to us. Please allow 3 business days for your refill to be completed.          Additional Information About Your Visit        [employer] Information     [employer] lets you send messages to your doctor, view your test results, renew your prescriptions, schedule appointments and more. To sign up, go to [email or web address] . Click on \"Log in\" on the left side of the screen, which will take you to the Welcome page. Then click on \"Sign up Now\" on the right side of the page.     You will be asked to enter the access code listed below, as well as some personal information. Please follow the directions to create your username and password.     Your access code is: [email or web address]  Expires: 2018 12:53 PM     Your access code will  in 90 days. If you " "need help or a new code, please call your [facility] clinic or [phone].        Care EveryWhere ID     This is your Care EveryWhere ID. This could be used by other organizations to access your [facility] medical records  [id number]        Your Vitals Were     Pulse Temperature Height Pulse Oximetry BMI (Body Mass Index)       75 97.5  F (36.4  C) (Tympanic) 4' 10.5\" (1.486 m) 97% 29.99 kg/m2        Blood Pressure from Last 3 Encounters:   03/29/18 120/82   03/05/18 136/86   02/27/18 122/78    Weight from Last 3 Encounters:   03/29/18 146 lb (66.2 kg)   03/05/18 147 lb (66.7 kg)   02/27/18 147 lb (66.7 kg)              We Performed the Following     CBC with platelets and differential     Comprehensive metabolic panel     Lipid Profile     [facility]        Primary Care Provider Office Phone # Fax #    Brooke Anastasiya Castaneda [phone]       [address] [facility] [address]        Equal Access to Services     [facility]: Hadii aad raghav hadasho Socarmen, waaxda luqadaha, qaybta kaalmada jose, aruna carranza . So [facility] [phone].    ATENCIÓN: Si habla español, tiene a frank disposición servicios gratuitos de asistencia lingüística. [facility] [phone].    We comply with applicable federal civil rights laws and [place] laws. We do not discriminate on the basis of race, color, national origin, age, disability, sex, sexual orientation, or gender identity.            Thank you!     Thank you for choosing [facility]  for your care. Our goal is always to provide you with excellent care. Hearing back from our patients is one way we can continue to improve our services. Please take a few minutes to complete the written survey that you may receive in the mail after your visit with us. Thank you!             Your Updated Medication List - Protect others around you: Learn how to safely use, store and throw away your medicines at " www.disposemymeds.org.          This list is accurate as of 3/29/18 10:24 AM.  Always use your most recent med list.                   Brand Name Dispense Instructions for use Diagnosis    ASPIRIN LOW DOSE 81 MG tablet   Generic drug:  aspirin     30 tablet    Take by mouth daily        atorvastatin 20 MG tablet    LIPITOR     Take 20 mg by mouth daily        CALCIUM + D 500-1000-40 MG-UNT-MCG Chew   Generic drug:  Calcium-Vitamin D-Vitamin K           Cod Liver Oil 5000-500 UNIT/5ML Oil           diclofenac 1 % Gel topical gel    VOLTAREN     Apply topically 2 times daily        EQL FLAXSEED OIL 1200 MG Caps      Take 1 capsule by mouth 3 times daily        hydrochlorothiazide 25 MG tablet    HYDRODIURIL     Take 25 mg by mouth daily        lecithin 400 MG Caps      Take 1 capsule by mouth 2 times daily        MENS MULTIVITAMIN PLUS Tabs     30 tablet         omega-3 acid ethyl esters 1 G capsule    Lovaza     Take 1,000 mg by mouth        Potassium Chloride ER 20 MEQ Tbcr      Take 20 mEq by mouth daily with food        triamcinolone 0.1 % cream    KENALOG     Apply topically 2 times daily        Vitamin B-12 CR 1000 MCG Tbcr           vitamin E 100 UNIT capsule    TOCOPHEROL

## 2018-03-30 ENCOUNTER — HOSPITAL ENCOUNTER (OUTPATIENT)
Dept: PHYSICAL THERAPY | Facility: OTHER | Age: 68
Setting detail: THERAPIES SERIES
End: 2018-03-30
Attending: FAMILY MEDICINE
Payer: MEDICARE

## 2018-03-30 PROCEDURE — G8978 MOBILITY CURRENT STATUS: HCPCS | Mod: GP,CK

## 2018-03-30 PROCEDURE — 97161 PT EVAL LOW COMPLEX 20 MIN: CPT | Mod: GP

## 2018-03-30 PROCEDURE — G8979 MOBILITY GOAL STATUS: HCPCS | Mod: GP,CJ

## 2018-03-30 PROCEDURE — 40000185 ZZHC STATISTIC PT OUTPT VISIT

## 2018-03-30 PROCEDURE — 97530 THERAPEUTIC ACTIVITIES: CPT | Mod: GP

## 2018-03-30 NOTE — PROGRESS NOTES
03/30/18 0700   General Information   Type of Visit Initial OP Ortho PT Evaluation   Start of Care Date 03/30/18   Referring Physician Dr. Castaneda   Patient/Family Goals Statement Develop strategies for transfers and mobility follow right shoulder surgery   Orders Evaluate and Treat   Date of Order 03/29/18   Insurance Type Medicare   Medical Diagnosis Chronic pain of left knee, Right shoulder rotator cuff tear   Surgical/Medical history reviewed Yes   Body Part(s)   Body Part(s) Knee   Presentation and Etiology   Pertinent history of current problem (include personal factors and/or comorbidities that impact the POC) Patient states she is scheduled for a right shoulder rotator cuff repair on 4/11/18.  She also has DJD of the left knee which makes transfers from sit to stand difficult.  She is concerned about ways to transfer and get around when her right arm is in a sling.     Impairments A. Pain;B. Decreased WB tolerance;C. Swelling;D. Decreased ROM;E. Decreased flexibility;F. Decreased strength and endurance;G. Impaired balance;H. Impaired gait;M. Locking or catching   Functional Limitations perform activities of daily living   Symptom Location Anterior knee    How/Where did it occur From insidious onset   Onset date of current episode/exacerbation 01/09/18   Chronicity New   Pain rating (0-10 point scale) Best (/10);Worst (/10)   Best (/10) 3/10   Worst (/10) 10/10   Pain quality A. Sharp;C. Aching;D. Burning   Frequency of pain/symptoms A. Constant   Pain/symptoms are: The same all the time   Pain/symptoms exacerbated by A. Sitting;C. Lifting;D. Carrying;G. Certain positions;I. Bending;K. Home tasks   Pain/symptoms eased by C. Rest;H. Cold;I. OTC medication(s);J. Braces/supports   Progression of symptoms since onset: Unchanged   Prior Level of Function   Prior Level of Function-Mobility No limitations    Prior Level of Function-ADLs No limitations    Current Level of Function   Patient role/employment  history C. [employer]   Fall Risk Screen   Fall screen completed by PT   Have you fallen 2 or more times in the past year? No   Have you fallen and had an injury in the past year? No   Is patient a fall risk? No   Knee Objective Findings   Observation Left knee swelling present   Gait/Locomotion Antalgic gait left LE with decreased left stance, and decreased left knee flexion during swing phase.    Left Knee Extension AROM lacking 10 degrees from full extension due to pain    Left Knee Flexion AROM 90 limited by pain    Left Knee Flexion Strength 4-/5   Left Knee Extension Strength 4-/5   Planned Therapy Interventions   Planned Therapy Interventions bed mobility training;gait training;neuromuscular re-education;transfer training;ROM   Planned Modality Interventions   Planned Modality Interventions Cryotherapy;Ultrasound   Clinical Impression   Criteria for Skilled Therapeutic Interventions Met yes, treatment indicated   PT Diagnosis Left knee DJD   Influenced by the following impairments pain, impaired ROM, weakness, swelling    Functional limitations due to impairments Impaired gait, impaired transfers, diffculty on stairs, difficulty with housework   Clinical Presentation Stable/Uncomplicated   Clinical Presentation Rationale See PSFS   Clinical Decision Making (Complexity) Low complexity   Therapy Frequency 2 times/Week   Predicted Duration of Therapy Intervention (days/wks) 4 weeks    Risk & Benefits of therapy have been explained Yes   Patient, Family & other staff in agreement with plan of care Yes   Education Assessment   Preferred Learning Style Listening;Demonstration;Pictures/video   Barriers to Learning No barriers   ORTHO GOALS   PT Ortho Eval Goals 1;2;3   Ortho Goal 1   Goal Identifier walking    Goal Description Report 50% improvement in walking while shopping    Target Date 04/27/18   Ortho Goal 2   Goal Identifier getting up from a chair   Goal Description Report 50% improvement when getting up from  a chair   Target Date 04/27/18   Ortho Goal 3   Goal Identifier carrying objects   Goal Description Report 50% improvement when carrying a laundry basket   Target Date 04/27/18   Total Evaluation Time   Total Evaluation Time 25   Therapy Certification   Certification date from 03/30/18   Certification date to 05/25/18   Medical Diagnosis Chronic pain of left knee, Right shoulder rotator cuff tear

## 2018-03-30 NOTE — PROGRESS NOTES
[facility]          OUTPATIENT PHYSICAL THERAPY ORTHOPEDIC EVALUATION  PLAN OF TREATMENT FOR OUTPATIENT REHABILITATION  (COMPLETE FOR INITIAL CLAIMS ONLY)  Patient's Last Name, First Name, M.I.  YOB: 1950  Arlyn Holder    Provider s Name:  [facility]   Medical Record No.  [id number]   Start of Care Date:  03/30/18   Onset Date:  01/09/18   Type:     _X__PT   ___OT   ___SLP Medical Diagnosis:  Chronic pain of left knee, Right shoulder rotator cuff tear     PT Diagnosis:  Left knee DJD   Visits from SOC:  1      _________________________________________________________________________________  Plan of Treatment/Functional Goals:  bed mobility training, gait training, neuromuscular re-education, transfer training, ROM     Cryotherapy, Ultrasound     Goals  Goal Identifier: walking   Goal Description: Report 50% improvement in walking while shopping   Target Date: 04/27/18    Goal Identifier: getting up from a chair  Goal Description: Report 50% improvement when getting up from a chair  Target Date: 04/27/18    Goal Identifier: carrying objects  Goal Description: Report 50% improvement when carrying a laundry basket  Target Date: 04/27/18                                                           Therapy Frequency:  2 times/Week  Predicted Duration of Therapy Intervention:  4 weeks     Abdiaziz Raza, PT                 I CERTIFY THE NEED FOR THESE SERVICES FURNISHED UNDER        THIS PLAN OF TREATMENT AND WHILE UNDER MY CARE     (Physician co-signature of this document indicates review and certification of the therapy plan).                       Certification Date From:  03/30/18   Certification Date To:  05/25/18    Referring Provider:  Dr. Castaneda    Initial Assessment        See Epic Evaluation Start of Care Date: 03/30/18

## 2018-03-30 NOTE — PROGRESS NOTES
[facility]          OUTPATIENT PHYSICAL THERAPY ORTHOPEDIC EVALUATION  PLAN OF TREATMENT FOR OUTPATIENT REHABILITATION  (COMPLETE FOR INITIAL CLAIMS ONLY)  Patient's Last Name, First Name, M.I.  YOB: 1950  Arlyn Holder    Provider s Name:  [facility]   Medical Record No.  [id number]   Start of Care Date:  03/30/18   Onset Date:  01/09/18   Type:     _X__PT   ___OT   ___SLP Medical Diagnosis:  Chronic pain of left knee, Right shoulder rotator cuff tear     PT Diagnosis:  Left knee DJD   Visits from SOC:  1      _________________________________________________________________________________  Plan of Treatment/Functional Goals:  bed mobility training, gait training, neuromuscular re-education, transfer training, ROM     Cryotherapy, Ultrasound     Goals  Goal Identifier: walking   Goal Description: Report 50% improvement in walking while shopping   Target Date: 04/27/18    Goal Identifier: getting up from a chair  Goal Description: Report 50% improvement when getting up from a chair  Target Date: 04/27/18    Goal Identifier: carrying objects  Goal Description: Report 50% improvement when carrying a laundry basket  Target Date: 04/27/18                                                           Therapy Frequency:  2 times/Week  Predicted Duration of Therapy Intervention:  4 weeks     Abdiaziz Raza, PT                 I CERTIFY THE NEED FOR THESE SERVICES FURNISHED UNDER        THIS PLAN OF TREATMENT AND WHILE UNDER MY CARE     (Physician co-signature of this document indicates review and certification of the therapy plan).                       Certification Date From:  03/30/18   Certification Date To:  05/25/18    Referring Provider:  Dr. Castaneda    Initial Assessment        See Epic Evaluation Start of Care Date: 03/30/18

## 2018-04-02 ENCOUNTER — HOSPITAL ENCOUNTER (OUTPATIENT)
Dept: PHYSICAL THERAPY | Facility: OTHER | Age: 68
Setting detail: THERAPIES SERIES
End: 2018-04-02
Attending: FAMILY MEDICINE
Payer: MEDICARE

## 2018-04-02 PROCEDURE — 97035 APP MDLTY 1+ULTRASOUND EA 15: CPT | Mod: GP

## 2018-04-02 PROCEDURE — 97110 THERAPEUTIC EXERCISES: CPT | Mod: GP

## 2018-04-02 PROCEDURE — 97530 THERAPEUTIC ACTIVITIES: CPT | Mod: GP

## 2018-04-02 PROCEDURE — 40000185 ZZHC STATISTIC PT OUTPT VISIT

## 2018-04-04 ENCOUNTER — TELEPHONE (OUTPATIENT)
Dept: FAMILY MEDICINE | Facility: OTHER | Age: 68
End: 2018-04-04

## 2018-04-04 ENCOUNTER — HOSPITAL ENCOUNTER (OUTPATIENT)
Dept: PHYSICAL THERAPY | Facility: OTHER | Age: 68
Setting detail: THERAPIES SERIES
End: 2018-04-04
Attending: FAMILY MEDICINE
Payer: MEDICARE

## 2018-04-04 PROCEDURE — 40000185 ZZHC STATISTIC PT OUTPT VISIT

## 2018-04-04 PROCEDURE — 97110 THERAPEUTIC EXERCISES: CPT | Mod: GP

## 2018-04-04 PROCEDURE — 97140 MANUAL THERAPY 1/> REGIONS: CPT | Mod: GP

## 2018-04-04 PROCEDURE — 97035 APP MDLTY 1+ULTRASOUND EA 15: CPT | Mod: GP

## 2018-04-04 NOTE — TELEPHONE ENCOUNTER
[facility] - Patient calling with concerns about lt leg swelling and being hot. Patient also requesting results of labs. Please call. Message can be left on machine.    Valerie Dominguez on 4/4/2018 at 4:17 PM'

## 2018-04-05 ENCOUNTER — HOSPITAL ENCOUNTER (OUTPATIENT)
Dept: CARDIOLOGY | Facility: OTHER | Age: 68
Discharge: HOME | End: 2018-04-05
Attending: FAMILY MEDICINE | Admitting: FAMILY MEDICINE
Payer: MEDICARE

## 2018-04-05 ENCOUNTER — HOSPITAL ENCOUNTER (OUTPATIENT)
Dept: ULTRASOUND IMAGING | Facility: OTHER | Age: 68
End: 2018-04-05
Attending: FAMILY MEDICINE
Payer: MEDICARE

## 2018-04-05 ENCOUNTER — OFFICE VISIT (OUTPATIENT)
Dept: FAMILY MEDICINE | Facility: OTHER | Age: 68
End: 2018-04-05
Attending: FAMILY MEDICINE
Payer: MEDICARE

## 2018-04-05 VITALS
WEIGHT: 142.6 LBS | DIASTOLIC BLOOD PRESSURE: 70 MMHG | TEMPERATURE: 97.3 F | SYSTOLIC BLOOD PRESSURE: 124 MMHG | BODY MASS INDEX: 29.3 KG/M2 | HEART RATE: 70 BPM

## 2018-04-05 DIAGNOSIS — M79.662 PAIN OF LEFT LOWER LEG: Primary | ICD-10-CM

## 2018-04-05 DIAGNOSIS — I77.810 ASCENDING AORTA DILATATION (H): ICD-10-CM

## 2018-04-05 DIAGNOSIS — M79.662 PAIN OF LEFT LOWER LEG: ICD-10-CM

## 2018-04-05 LAB — D DIMER PPP DDU-MCNC: 535 NG/ML D-DU (ref 0–230)

## 2018-04-05 PROCEDURE — 36415 COLL VENOUS BLD VENIPUNCTURE: CPT | Performed by: FAMILY MEDICINE

## 2018-04-05 PROCEDURE — 99214 OFFICE O/P EST MOD 30 MIN: CPT | Performed by: FAMILY MEDICINE

## 2018-04-05 PROCEDURE — 93971 EXTREMITY STUDY: CPT | Mod: LT

## 2018-04-05 PROCEDURE — 85379 FIBRIN DEGRADATION QUANT: CPT | Performed by: FAMILY MEDICINE

## 2018-04-05 PROCEDURE — 93306 TTE W/DOPPLER COMPLETE: CPT

## 2018-04-05 PROCEDURE — G0463 HOSPITAL OUTPT CLINIC VISIT: HCPCS

## 2018-04-05 PROCEDURE — G0463 HOSPITAL OUTPT CLINIC VISIT: HCPCS | Mod: 25

## 2018-04-05 PROCEDURE — 93306 TTE W/DOPPLER COMPLETE: CPT | Mod: 26 | Performed by: INTERNAL MEDICINE

## 2018-04-05 ASSESSMENT — PAIN SCALES - GENERAL: PAINLEVEL: MILD PAIN (3)

## 2018-04-05 NOTE — PROGRESS NOTES
SUBJECTIVE:   Arlyn Holder is a 67 year old female who presents to clinic today for the following health issues: comes in stating she has had pain in L lower leg since January.. There has been swelling thought to be related to a large Bakers cyst. She has noted since mid March more warmth in the lower leg and pain. No cp nor SOB.               Problem list and histories reviewed & adjusted, as indicated.  Additional history: as documented        Reviewed and updated as needed this visit by clinical staff  Tobacco  Allergies  Meds  Med Hx  Surg Hx  Fam Hx  Soc Hx      Reviewed and updated as needed this visit by Provider         ROS:  CONSTITUTIONAL: NEGATIVE for fever, chills, change in weight  INTEGUMENTARY/SKIN: NEGATIVE for worrisome rashes, moles or lesions  EYES: NEGATIVE for vision changes or irritation  ENT/MOUTH: NEGATIVE for ear, mouth and throat problems  RESP: NEGATIVE for significant cough or SOB  BREAST: NEGATIVE for masses, tenderness or discharge  CV: NEGATIVE for chest pain, palpitations or peripheral edema  GI: NEGATIVE for nausea, abdominal pain, heartburn, or change in bowel habits  : NEGATIVE for frequency, dysuria, or hematuria  MUSCULOSKELETAL: NEGATIVE for significant arthralgias or myalgia  NEURO: NEGATIVE for weakness, dizziness or paresthesias  ENDOCRINE: NEGATIVE for temperature intolerance, skin/hair changes  HEME: NEGATIVE for bleeding problems  PSYCHIATRIC: NEGATIVE for changes in mood or affect    OBJECTIVE:     /70 (BP Location: Right arm, Patient Position: Sitting, Cuff Size: Adult Regular)  Pulse 70  Temp 97.3  F (36.3  C) (Tympanic)  Wt 142 lb 9.6 oz (64.7 kg)  BMI 29.3 kg/m2  Body mass index is 29.3 kg/(m^2).  GENERAL: healthy, alert and no distress  NECK: no adenopathy, no asymmetry, masses, or scars and thyroid normal to palpation  RESP: lungs clear to auscultation - no rales, rhonchi or wheezes  CV: regular rate and rhythm, normal S1 S2, no S3 or S4, no  murmur, click or rub, no peripheral edema and peripheral pulses strong  MS:L  lower leg has some swelling wo pitting / no redness and minimal tenderness in calf    D Dimer elevated so will do DVT  Leg vein US today    ASSESSMENT/PLAN:       1. Pain of left lower leg  Us neg for clot but + for large ruptured cyst/ rec ib and tylenol  - D-Dimer ([facility])  - US Lower Extremity Venous Duplex Left; Future    See Patient Instructions    CHRISS GUTIERREZ MD  [facility] [place] [facility]

## 2018-04-05 NOTE — MR AVS SNAPSHOT
After Visit Summary   4/5/2018    Arlyn Holder    MRN: [id number]           Patient Information     Date Of Birth          1950        Visit Information        Provider Department      4/5/2018 11:00 AM Wade Giles MD [facility]        Today's Diagnoses     Pain of left lower leg    -  1       Follow-ups after your visit        Your next 10 appointments already scheduled     Apr 09, 2018  9:00 AM CDT   Treatment with Abdiaziz Raza, PT   [facility] ([facility])    [address]  [place] [address]   [phone]            Apr 11, 2018   Procedure with Wilner Painting, DO   [facility] ([facility])    [address]  [place] [address] [phone]            Apr 19, 2018 10:15 AM CDT   Return Visit with Wilner Painting DO   [facility] ([facility])    [address]  [place] [address] [phone]              Future tests that were ordered for you today     Open Future Orders        Priority Expected Expires Ordered    US Lower Extremity Venous Duplex Left Routine  4/5/2019 4/5/2018            Who to contact     If you have questions or need follow up information about today's clinic visit or your schedule please contact [facility] hospitals directly at [phone].  Normal or non-critical lab and imaging results will be communicated to you by MyChart, letter or phone within 4 business days after the clinic has received the results. If you do not hear from us within 7 days, please contact the clinic through MyChart or phone. If you have a critical or abnormal lab result, we will notify you by phone as soon as possible.  Submit refill requests through [employer] or call your pharmacy and they will forward the refill request to us. Please allow 3 business days for your refill to be  "completed.          Additional Information About Your Visit        [employer] Information     [employer] lets you send messages to your doctor, view your test results, renew your prescriptions, schedule appointments and more. To sign up, go to [email or web address] . Click on \"Log in\" on the left side of the screen, which will take you to the Welcome page. Then click on \"Sign up Now\" on the right side of the page.     You will be asked to enter the access code listed below, as well as some personal information. Please follow the directions to create your username and password.     Your access code is: [email or web address]  Expires: 2018 12:53 PM     Your access code will  in 90 days. If you need help or a new code, please call your [facility] clinic or [phone].        Care EveryWhere ID     This is your Care EveryWhere ID. This could be used by other organizations to access your [facility] medical records  [id number]        Your Vitals Were     Pulse Temperature BMI (Body Mass Index)             70 97.3  F (36.3  C) (Tympanic) 29.3 kg/m2          Blood Pressure from Last 3 Encounters:   18 124/70   18 120/82   18 136/86    Weight from Last 3 Encounters:   18 142 lb 9.6 oz (64.7 kg)   18 146 lb (66.2 kg)   18 147 lb (66.7 kg)              We Performed the Following     D-Dimer ([facility])        Primary Care Provider Office Phone # Fax #    Brooke Anastasiya Castaneda [phone] [address] [facility] [address]        Equal Access to Services     [facility] Center[facility] : Hadii hawa Ruggiero, ale lau, adelinata liviaalaruna hannon. So [facility] [phone].    ATENCIÓN: Si habla español, tiene a frank disposición servicios gratuitos de asistencia lingüística. Llame al [phone].    We comply with applicable federal civil rights laws and [place] laws. We do not discriminate on the basis of race, color, " national origin, age, disability, sex, sexual orientation, or gender identity.            Thank you!     Thank you for choosing [facility]  for your care. Our goal is always to provide you with excellent care. Hearing back from our patients is one way we can continue to improve our services. Please take a few minutes to complete the written survey that you may receive in the mail after your visit with us. Thank you!             Your Updated Medication List - Protect others around you: Learn how to safely use, store and throw away your medicines at www.disposemymeds.org.          This list is accurate as of 4/5/18  1:48 PM.  Always use your most recent med list.                   Brand Name Dispense Instructions for use Diagnosis    ASPIRIN LOW DOSE 81 MG tablet   Generic drug:  aspirin     30 tablet    Take by mouth daily        atorvastatin 20 MG tablet    LIPITOR    90 tablet    Take 1 tablet (20 mg) by mouth daily    Pure hypercholesterolemia       CALCIUM + D 500-1000-40 MG-UNT-MCG Chew   Generic drug:  Calcium-Vitamin D-Vitamin K           Cod Liver Oil 5000-500 UNIT/5ML Oil           diclofenac 1 % Gel topical gel    VOLTAREN     Apply topically 2 times daily        EQL FLAXSEED OIL 1200 MG Caps      Take 1 capsule by mouth 3 times daily        hydrochlorothiazide 25 MG tablet    HYDRODIURIL    90 tablet    Take 1 tablet (25 mg) by mouth daily    Essential hypertension       lecithin 400 MG Caps      Take 1 capsule by mouth 2 times daily        MENS MULTIVITAMIN PLUS Tabs     30 tablet         omega-3 acid ethyl esters 1 G capsule    Lovaza     Take 1,000 mg by mouth        Potassium Chloride ER 20 MEQ Tbcr     90 tablet    Take 1 tablet (20 mEq) by mouth daily with food    Essential hypertension       triamcinolone 0.1 % cream    KENALOG     Apply topically 2 times daily        Vitamin B-12 CR 1000 MCG Tbcr           vitamin E 100 UNIT capsule    TOCOPHEROL

## 2018-04-05 NOTE — NURSING NOTE
Patient presents in the clinic with concerns of left leg pain. Patient states pain, and warmth in her left leg near her knee began in January.  Ronda Yuan LPN 4/5/2018 10:47 AM

## 2018-04-06 NOTE — TELEPHONE ENCOUNTER
She was seen by Wade Giles MD yesterday.   Note closed.  Michell Meyer LPN ..........4/6/2018 8:54 AM

## 2018-04-09 ENCOUNTER — HOSPITAL ENCOUNTER (OUTPATIENT)
Dept: PHYSICAL THERAPY | Facility: OTHER | Age: 68
Setting detail: THERAPIES SERIES
End: 2018-04-09
Attending: FAMILY MEDICINE
Payer: MEDICARE

## 2018-04-09 PROCEDURE — 97035 APP MDLTY 1+ULTRASOUND EA 15: CPT | Mod: GP

## 2018-04-09 PROCEDURE — G8980 MOBILITY D/C STATUS: HCPCS | Mod: GP,CJ

## 2018-04-09 PROCEDURE — 97140 MANUAL THERAPY 1/> REGIONS: CPT | Mod: GP

## 2018-04-09 PROCEDURE — G8979 MOBILITY GOAL STATUS: HCPCS | Mod: GP,CJ

## 2018-04-09 PROCEDURE — 40000185 ZZHC STATISTIC PT OUTPT VISIT

## 2018-04-09 NOTE — PROGRESS NOTES
Outpatient Physical Therapy Discharge Note     Patient: Arlyn Holder  : 1950    Beginning/End Dates of Reporting Period:  3/30/18 to 2018    Referring Provider: Dr. Castaneda    Therapy Diagnosis: Left knee pain     Client Self Report: Patient states she had an US which rulled out a DVT, but noted a ruptured Baker cyst.  She is scheduled for right shoulder rotator cuff repair 18.   Patient states PT treatment has helped decrease knee pain and improve walking and mobility.  She feels more confident in her mobility going into the shoulder surgery.     Objective Measurements:  Objective Measure: Pain rating  Details: 3/10 anterior medial knee.    Objective Measure: Palpable muscle tightness over the medial gastroc        Outcome Measures (most recent score):  PSFS score improved.  Currently at 30 % impairment     Goals:  Goal Identifier walking    Goal Description Report 50% improvement in walking while shopping    Target Date 18   Date Met  18   Progress: Goal Met      Goal Identifier getting up from a chair   Goal Description Report 50% improvement when getting up from a chair   Target Date 18   Date Met  18   Progress: Goal Met      Goal Identifier carrying objects   Goal Description Report 50% improvement when carrying a laundry basket   Target Date 18   Date Met   (Improved 40% overall with carrying)   Progress: Progressing.  Reports carrying has improved 40%        Progress Toward Goals:   Progress this reporting period: Patient has made progress towards goals as listed above.       Plan:  Discharge from therapy.    Discharge:    Reason for Discharge: Patient has made progress towards goals.  She is scheduled for right shoulder surgery on 18.     Equipment Issued: NA    Discharge Plan: Patient to continue home program.

## 2018-04-10 ENCOUNTER — ANESTHESIA EVENT (OUTPATIENT)
Dept: SURGERY | Facility: OTHER | Age: 68
End: 2018-04-10
Payer: MEDICARE

## 2018-04-11 ENCOUNTER — ANESTHESIA (OUTPATIENT)
Dept: SURGERY | Facility: OTHER | Age: 68
End: 2018-04-11
Payer: MEDICARE

## 2018-04-11 ENCOUNTER — HOSPITAL ENCOUNTER (OUTPATIENT)
Facility: OTHER | Age: 68
Discharge: HOME | End: 2018-04-11
Attending: ORTHOPAEDIC SURGERY | Admitting: ORTHOPAEDIC SURGERY
Payer: MEDICARE

## 2018-04-11 ENCOUNTER — SURGERY (OUTPATIENT)
Age: 68
End: 2018-04-11

## 2018-04-11 VITALS
SYSTOLIC BLOOD PRESSURE: 136 MMHG | WEIGHT: 137.8 LBS | HEIGHT: 59 IN | DIASTOLIC BLOOD PRESSURE: 79 MMHG | HEART RATE: 81 BPM | OXYGEN SATURATION: 96 % | BODY MASS INDEX: 27.78 KG/M2 | TEMPERATURE: 97.6 F | RESPIRATION RATE: 16 BRPM

## 2018-04-11 DIAGNOSIS — Z98.890 S/P ARTHROSCOPY OF SHOULDER: Primary | ICD-10-CM

## 2018-04-11 PROCEDURE — 36000056 ZZH SURGERY LEVEL 3 1ST 30 MIN: Performed by: ORTHOPAEDIC SURGERY

## 2018-04-11 PROCEDURE — 27210794 ZZH OR GENERAL SUPPLY STERILE: Performed by: ORTHOPAEDIC SURGERY

## 2018-04-11 PROCEDURE — C1713 ANCHOR/SCREW BN/BN,TIS/BN: HCPCS | Performed by: ORTHOPAEDIC SURGERY

## 2018-04-11 PROCEDURE — 25000128 H RX IP 250 OP 636: Performed by: ANESTHESIOLOGY

## 2018-04-11 PROCEDURE — 29824 SHO ARTHRS SRG DSTL CLAVICLC: CPT | Performed by: ORTHOPAEDIC SURGERY

## 2018-04-11 PROCEDURE — 71000014 ZZH RECOVERY PHASE 1 LEVEL 2 FIRST HR: Performed by: ORTHOPAEDIC SURGERY

## 2018-04-11 PROCEDURE — 40000306 ZZH STATISTIC PRE PROC ASSESS II: Performed by: ORTHOPAEDIC SURGERY

## 2018-04-11 PROCEDURE — 25000125 ZZHC RX 250: Performed by: ANESTHESIOLOGY

## 2018-04-11 PROCEDURE — 71000027 ZZH RECOVERY PHASE 2 EACH 15 MINS: Performed by: ORTHOPAEDIC SURGERY

## 2018-04-11 PROCEDURE — 29823 SHO ARTHRS SRG XTNSV DBRDMT: CPT | Performed by: ANESTHESIOLOGY

## 2018-04-11 PROCEDURE — 25000125 ZZHC RX 250: Performed by: NURSE ANESTHETIST, CERTIFIED REGISTERED

## 2018-04-11 PROCEDURE — 76942 ECHO GUIDE FOR BIOPSY: CPT | Mod: 26 | Performed by: ANESTHESIOLOGY

## 2018-04-11 PROCEDURE — 29823 SHO ARTHRS SRG XTNSV DBRDMT: CPT | Performed by: NURSE ANESTHETIST, CERTIFIED REGISTERED

## 2018-04-11 PROCEDURE — 29827 SHO ARTHRS SRG RT8TR CUF RPR: CPT | Performed by: ORTHOPAEDIC SURGERY

## 2018-04-11 PROCEDURE — 25000132 ZZH RX MED GY IP 250 OP 250 PS 637: Mod: GY | Performed by: ORTHOPAEDIC SURGERY

## 2018-04-11 PROCEDURE — 29823 SHO ARTHRS SRG XTNSV DBRDMT: CPT | Performed by: ORTHOPAEDIC SURGERY

## 2018-04-11 PROCEDURE — 25800025 ZZH RX 258: Performed by: ORTHOPAEDIC SURGERY

## 2018-04-11 PROCEDURE — 29826 SHO ARTHRS SRG DECOMPRESSION: CPT | Performed by: ORTHOPAEDIC SURGERY

## 2018-04-11 PROCEDURE — 37000008 ZZH ANESTHESIA TECHNICAL FEE, 1ST 30 MIN: Performed by: ORTHOPAEDIC SURGERY

## 2018-04-11 PROCEDURE — 64415 NJX AA&/STRD BRCH PLXS IMG: CPT | Mod: XU | Performed by: ANESTHESIOLOGY

## 2018-04-11 PROCEDURE — 36000058 ZZH SURGERY LEVEL 3 EA 15 ADDTL MIN: Performed by: ORTHOPAEDIC SURGERY

## 2018-04-11 PROCEDURE — 25000128 H RX IP 250 OP 636: Performed by: ORTHOPAEDIC SURGERY

## 2018-04-11 PROCEDURE — 37000009 ZZH ANESTHESIA TECHNICAL FEE, EACH ADDTL 15 MIN: Performed by: ORTHOPAEDIC SURGERY

## 2018-04-11 PROCEDURE — 25000128 H RX IP 250 OP 636: Performed by: NURSE ANESTHETIST, CERTIFIED REGISTERED

## 2018-04-11 PROCEDURE — A9270 NON-COVERED ITEM OR SERVICE: HCPCS | Mod: GY | Performed by: ORTHOPAEDIC SURGERY

## 2018-04-11 DEVICE — IMP ANCHOR ARTHREX BIO-SWIVELOCK 5.5MM AR-2323BCC: Type: IMPLANTABLE DEVICE | Site: SHOULDER | Status: FUNCTIONAL

## 2018-04-11 RX ORDER — ONDANSETRON 4 MG/1
4 TABLET, ORALLY DISINTEGRATING ORAL EVERY 8 HOURS PRN
Qty: 20 TABLET | Refills: 0 | Status: SHIPPED | OUTPATIENT
Start: 2018-04-11 | End: 2019-04-18

## 2018-04-11 RX ORDER — ONDANSETRON 2 MG/ML
INJECTION INTRAMUSCULAR; INTRAVENOUS PRN
Status: DISCONTINUED | OUTPATIENT
Start: 2018-04-11 | End: 2018-04-11

## 2018-04-11 RX ORDER — CEFAZOLIN SODIUM 1 G/50ML
1 INJECTION, SOLUTION INTRAVENOUS SEE ADMIN INSTRUCTIONS
Status: DISCONTINUED | OUTPATIENT
Start: 2018-04-11 | End: 2018-04-11 | Stop reason: HOSPADM

## 2018-04-11 RX ORDER — PROPOFOL 10 MG/ML
INJECTION, EMULSION INTRAVENOUS CONTINUOUS PRN
Status: DISCONTINUED | OUTPATIENT
Start: 2018-04-11 | End: 2018-04-11

## 2018-04-11 RX ORDER — SODIUM CHLORIDE, SODIUM LACTATE, POTASSIUM CHLORIDE, CALCIUM CHLORIDE 600; 310; 30; 20 MG/100ML; MG/100ML; MG/100ML; MG/100ML
INJECTION, SOLUTION INTRAVENOUS CONTINUOUS
Status: DISCONTINUED | OUTPATIENT
Start: 2018-04-11 | End: 2018-04-11 | Stop reason: HOSPADM

## 2018-04-11 RX ORDER — HYDROCODONE BITARTRATE AND ACETAMINOPHEN 5; 325 MG/1; MG/1
1 TABLET ORAL
Status: DISCONTINUED | OUTPATIENT
Start: 2018-04-11 | End: 2018-04-11 | Stop reason: HOSPADM

## 2018-04-11 RX ORDER — ACETAMINOPHEN 10 MG/ML
INJECTION, SOLUTION INTRAVENOUS PRN
Status: DISCONTINUED | OUTPATIENT
Start: 2018-04-11 | End: 2018-04-11

## 2018-04-11 RX ORDER — ACETAMINOPHEN 325 MG/1
650 TABLET ORAL
Status: DISCONTINUED | OUTPATIENT
Start: 2018-04-11 | End: 2018-04-11 | Stop reason: HOSPADM

## 2018-04-11 RX ORDER — FENTANYL CITRATE 50 UG/ML
25-50 INJECTION, SOLUTION INTRAMUSCULAR; INTRAVENOUS EVERY 5 MIN PRN
Status: DISCONTINUED | OUTPATIENT
Start: 2018-04-11 | End: 2018-04-11 | Stop reason: HOSPADM

## 2018-04-11 RX ORDER — ALBUTEROL SULFATE 0.83 MG/ML
2.5 SOLUTION RESPIRATORY (INHALATION) EVERY 4 HOURS PRN
Status: DISCONTINUED | OUTPATIENT
Start: 2018-04-11 | End: 2018-04-11 | Stop reason: HOSPADM

## 2018-04-11 RX ORDER — NALOXONE HYDROCHLORIDE 0.4 MG/ML
.1-.4 INJECTION, SOLUTION INTRAMUSCULAR; INTRAVENOUS; SUBCUTANEOUS
Status: DISCONTINUED | OUTPATIENT
Start: 2018-04-11 | End: 2018-04-11 | Stop reason: HOSPADM

## 2018-04-11 RX ORDER — EPINEPHRINE 1 MG/ML
VIAL (ML) INJECTION PRN
Status: DISCONTINUED | OUTPATIENT
Start: 2018-04-11 | End: 2018-04-11 | Stop reason: HOSPADM

## 2018-04-11 RX ORDER — DEXAMETHASONE SODIUM PHOSPHATE 4 MG/ML
INJECTION, SOLUTION INTRA-ARTICULAR; INTRALESIONAL; INTRAMUSCULAR; INTRAVENOUS; SOFT TISSUE PRN
Status: DISCONTINUED | OUTPATIENT
Start: 2018-04-11 | End: 2018-04-11

## 2018-04-11 RX ORDER — HYDROCODONE BITARTRATE AND ACETAMINOPHEN 10; 325 MG/1; MG/1
TABLET ORAL
Qty: 60 TABLET | Refills: 0 | Status: SHIPPED | OUTPATIENT
Start: 2018-04-11 | End: 2018-06-01

## 2018-04-11 RX ORDER — HYDROMORPHONE HYDROCHLORIDE 1 MG/ML
.3-.5 INJECTION, SOLUTION INTRAMUSCULAR; INTRAVENOUS; SUBCUTANEOUS EVERY 10 MIN PRN
Status: DISCONTINUED | OUTPATIENT
Start: 2018-04-11 | End: 2018-04-11 | Stop reason: HOSPADM

## 2018-04-11 RX ORDER — DEXAMETHASONE SODIUM PHOSPHATE 4 MG/ML
4 INJECTION, SOLUTION INTRA-ARTICULAR; INTRALESIONAL; INTRAMUSCULAR; INTRAVENOUS; SOFT TISSUE EVERY 10 MIN PRN
Status: DISCONTINUED | OUTPATIENT
Start: 2018-04-11 | End: 2018-04-11 | Stop reason: HOSPADM

## 2018-04-11 RX ORDER — PROPOFOL 10 MG/ML
INJECTION, EMULSION INTRAVENOUS PRN
Status: DISCONTINUED | OUTPATIENT
Start: 2018-04-11 | End: 2018-04-11

## 2018-04-11 RX ORDER — CEFAZOLIN SODIUM 2 G/100ML
2 INJECTION, SOLUTION INTRAVENOUS
Status: COMPLETED | OUTPATIENT
Start: 2018-04-11 | End: 2018-04-11

## 2018-04-11 RX ORDER — ONDANSETRON 2 MG/ML
4 INJECTION INTRAMUSCULAR; INTRAVENOUS EVERY 30 MIN PRN
Status: DISCONTINUED | OUTPATIENT
Start: 2018-04-11 | End: 2018-04-11 | Stop reason: HOSPADM

## 2018-04-11 RX ORDER — OXYCODONE HYDROCHLORIDE 5 MG/1
5 TABLET ORAL EVERY 4 HOURS PRN
Status: DISCONTINUED | OUTPATIENT
Start: 2018-04-11 | End: 2018-04-11 | Stop reason: HOSPADM

## 2018-04-11 RX ORDER — FENTANYL CITRATE 50 UG/ML
50 INJECTION, SOLUTION INTRAMUSCULAR; INTRAVENOUS
Status: DISCONTINUED | OUTPATIENT
Start: 2018-04-11 | End: 2018-04-11 | Stop reason: HOSPADM

## 2018-04-11 RX ORDER — LIDOCAINE HYDROCHLORIDE 20 MG/ML
INJECTION, SOLUTION INFILTRATION; PERINEURAL PRN
Status: DISCONTINUED | OUTPATIENT
Start: 2018-04-11 | End: 2018-04-11

## 2018-04-11 RX ORDER — MEPERIDINE HYDROCHLORIDE 50 MG/ML
12.5 INJECTION INTRAMUSCULAR; INTRAVENOUS; SUBCUTANEOUS
Status: DISCONTINUED | OUTPATIENT
Start: 2018-04-11 | End: 2018-04-11 | Stop reason: HOSPADM

## 2018-04-11 RX ORDER — ONDANSETRON 4 MG/1
4 TABLET, ORALLY DISINTEGRATING ORAL EVERY 30 MIN PRN
Status: DISCONTINUED | OUTPATIENT
Start: 2018-04-11 | End: 2018-04-11 | Stop reason: HOSPADM

## 2018-04-11 RX ORDER — LIDOCAINE 40 MG/G
CREAM TOPICAL
Status: DISCONTINUED | OUTPATIENT
Start: 2018-04-11 | End: 2018-04-11 | Stop reason: HOSPADM

## 2018-04-11 RX ORDER — CEPHALEXIN 500 MG/1
500 CAPSULE ORAL 4 TIMES DAILY
Qty: 20 CAPSULE | Refills: 0 | Status: SHIPPED | OUTPATIENT
Start: 2018-04-11 | End: 2018-04-16

## 2018-04-11 RX ORDER — PANTOPRAZOLE SODIUM 40 MG/1
40 TABLET, DELAYED RELEASE ORAL
Status: COMPLETED | OUTPATIENT
Start: 2018-04-11 | End: 2018-04-11

## 2018-04-11 RX ORDER — HYDRALAZINE HYDROCHLORIDE 20 MG/ML
2.5-5 INJECTION INTRAMUSCULAR; INTRAVENOUS EVERY 10 MIN PRN
Status: DISCONTINUED | OUTPATIENT
Start: 2018-04-11 | End: 2018-04-11 | Stop reason: HOSPADM

## 2018-04-11 RX ADMIN — PHENYLEPHRINE HYDROCHLORIDE 100 MCG: 10 INJECTION, SOLUTION INTRAMUSCULAR; INTRAVENOUS; SUBCUTANEOUS at 11:12

## 2018-04-11 RX ADMIN — PHENYLEPHRINE HYDROCHLORIDE 100 MCG: 10 INJECTION, SOLUTION INTRAMUSCULAR; INTRAVENOUS; SUBCUTANEOUS at 11:06

## 2018-04-11 RX ADMIN — PHENYLEPHRINE HYDROCHLORIDE 100 MCG: 10 INJECTION, SOLUTION INTRAMUSCULAR; INTRAVENOUS; SUBCUTANEOUS at 11:25

## 2018-04-11 RX ADMIN — PHENYLEPHRINE HYDROCHLORIDE 100 MCG: 10 INJECTION, SOLUTION INTRAMUSCULAR; INTRAVENOUS; SUBCUTANEOUS at 11:30

## 2018-04-11 RX ADMIN — DEXAMETHASONE SODIUM PHOSPHATE 4 MG: 4 INJECTION, SOLUTION INTRA-ARTICULAR; INTRALESIONAL; INTRAMUSCULAR; INTRAVENOUS; SOFT TISSUE at 10:20

## 2018-04-11 RX ADMIN — PHENYLEPHRINE HYDROCHLORIDE 100 MCG: 10 INJECTION, SOLUTION INTRAMUSCULAR; INTRAVENOUS; SUBCUTANEOUS at 10:49

## 2018-04-11 RX ADMIN — PHENYLEPHRINE HYDROCHLORIDE 100 MCG: 10 INJECTION, SOLUTION INTRAMUSCULAR; INTRAVENOUS; SUBCUTANEOUS at 11:35

## 2018-04-11 RX ADMIN — SODIUM CHLORIDE, SODIUM LACTATE, POTASSIUM CHLORIDE, AND CALCIUM CHLORIDE 10 ML/HR: 600; 310; 30; 20 INJECTION, SOLUTION INTRAVENOUS at 09:35

## 2018-04-11 RX ADMIN — MIDAZOLAM HYDROCHLORIDE 2 MG: 1 INJECTION, SOLUTION INTRAMUSCULAR; INTRAVENOUS at 09:39

## 2018-04-11 RX ADMIN — PHENYLEPHRINE HYDROCHLORIDE 100 MCG: 10 INJECTION, SOLUTION INTRAMUSCULAR; INTRAVENOUS; SUBCUTANEOUS at 10:44

## 2018-04-11 RX ADMIN — LIDOCAINE HYDROCHLORIDE 40 MG: 20 INJECTION, SOLUTION INFILTRATION; PERINEURAL at 10:20

## 2018-04-11 RX ADMIN — PHENYLEPHRINE HYDROCHLORIDE 200 MCG: 10 INJECTION, SOLUTION INTRAMUSCULAR; INTRAVENOUS; SUBCUTANEOUS at 10:54

## 2018-04-11 RX ADMIN — PHENYLEPHRINE HYDROCHLORIDE 100 MCG: 10 INJECTION, SOLUTION INTRAMUSCULAR; INTRAVENOUS; SUBCUTANEOUS at 11:01

## 2018-04-11 RX ADMIN — PANTOPRAZOLE SODIUM 40 MG: 40 TABLET, DELAYED RELEASE ORAL at 08:50

## 2018-04-11 RX ADMIN — PHENYLEPHRINE HYDROCHLORIDE 100 MCG: 10 INJECTION, SOLUTION INTRAMUSCULAR; INTRAVENOUS; SUBCUTANEOUS at 10:39

## 2018-04-11 RX ADMIN — ONDANSETRON 4 MG: 2 INJECTION INTRAMUSCULAR; INTRAVENOUS at 10:20

## 2018-04-11 RX ADMIN — ACETAMINOPHEN 1000 MG: 10 INJECTION, SOLUTION INTRAVENOUS at 10:37

## 2018-04-11 RX ADMIN — EPINEPHRINE 18 ML: 1 INJECTION, SOLUTION INTRAMUSCULAR; SUBCUTANEOUS at 11:37

## 2018-04-11 RX ADMIN — SODIUM CHLORIDE 6800 ML: 900 IRRIGANT IRRIGATION at 11:39

## 2018-04-11 RX ADMIN — LIDOCAINE HYDROCHLORIDE 0.1 ML: 10 INJECTION, SOLUTION EPIDURAL; INFILTRATION; INTRACAUDAL; PERINEURAL at 09:36

## 2018-04-11 RX ADMIN — PROPOFOL 170 MG: 10 INJECTION, EMULSION INTRAVENOUS at 10:20

## 2018-04-11 RX ADMIN — PHENYLEPHRINE HYDROCHLORIDE 100 MCG: 10 INJECTION, SOLUTION INTRAMUSCULAR; INTRAVENOUS; SUBCUTANEOUS at 11:18

## 2018-04-11 RX ADMIN — CEFAZOLIN SODIUM 2 G: 2 INJECTION, SOLUTION INTRAVENOUS at 10:13

## 2018-04-11 RX ADMIN — PROPOFOL 180 MCG/KG/MIN: 10 INJECTION, EMULSION INTRAVENOUS at 10:20

## 2018-04-11 NOTE — IP AVS SNAPSHOT
[facility]    [address]    [place] [address]    Phone:  [phone]    Fax:  [phone]                                       After Visit Summary   4/11/2018    Arlyn Holder    MRN: [id number]           After Visit Summary Signature Page     I have received my discharge instructions, and my questions have been answered. I have discussed any challenges I see with this plan with the nurse or doctor.    ..........................................................................................................................................  Patient/Patient Representative Signature      ..........................................................................................................................................  Patient Representative Print Name and Relationship to Patient    ..................................................               ................................................  Date                                            Time    ..........................................................................................................................................  Reviewed by Signature/Title    ...................................................              ..............................................  Date                                                            Time

## 2018-04-11 NOTE — ANESTHESIA CARE TRANSFER NOTE
Patient: Arlyn Holder    Procedure(s):  Right Shoulder Arthroscopy with Subacromial Decompression, Distal Clavicle Excision, Rotator Cuff Repair, Biceps Tenotomy, Extensive Debridement - Wound Class: I-Clean    Diagnosis: right impingement syndrome, ac arthritis, rotator cuff tear  Diagnosis Additional Information: No value filed.    Anesthesia Type:   General, LMA, Periph. Nerve Block for postop pain     Note:  Airway :Room Air  Patient transferred to:Phase II  Handoff Report: Identifed the Patient, Identified the Reponsible Provider, Reviewed the pertinent medical history, Discussed the surgical course, Reviewed Intra-OP anesthesia mangement and issues during anesthesia, Set expectations for post-procedure period and Allowed opportunity for questions and acknowledgement of understanding      Vitals: (Last set prior to Anesthesia Care Transfer)              Electronically Signed By: LUX FELIX CRNA  April 11, 2018  12:14 PM

## 2018-04-11 NOTE — BRIEF OP NOTE
POST OPERATIVE / POST PROCEDURE NOTE - IMMEDIATE :    Surgeon(s)/Proceduralist(s) and Assistants (if any):  Surgeon(s):  Wilner Painting DO    Procedure(s):  Right shoulder arthroscopy with ED/SAD/DCE/RTCR/biceps tenotomy/chondroplasty    Procedure(s) findings:   See op note    Specimen(s) removed: No    (EBL) Estimated blood loss (ml): 5    Postoperative/Postprocedure Diagnosis:   See op note    Wilner Painting D.O.  [place] [address]  Phone [phone] (KNEE)  Fax [phone]    11:33 AM 4/11/2018

## 2018-04-11 NOTE — H&P (VIEW-ONLY)
[facility] AND \[facility]\""  [address]  [place] [address]  [phone]    PRE-OP EVALUATION:  Today's date: 3/29/2018    Arlyn Holder (: 1950) presents for pre-operative evaluation assessment as requested by Dr. Painting.  She requires evaluation and anesthesia risk assessment prior to undergoing surgery/procedure for treatment of right shoulder arthroplasty .    Proposed Surgery/ Procedure: right shoulder arthroscopy and rotator cuff repair   Date of Surgery/ Procedure: 18  Time of Surgery/ Procedure: unknown  Hospital/Surgical Facility: [facility]  Fax number for surgical facility: [phone]  Primary Physician: Brooke Castaneda  Type of Anesthesia Anticipated: to be determined    Patient has a Health Care Directive or Living Will:  YES     1. NO - Do you have a history of heart attack, stroke, stent, bypass or surgery on an artery in the head, neck, heart or legs?  2. NO - Do you ever have any pain or discomfort in your chest?  3. NO - Do you have a history of  Heart Failure?  4. NO - Are you troubled by shortness of breath when: walking on the level, up a slight hill or at night?  5. NO - Do you currently have a cold, bronchitis or other respiratory infection?  6. NO - Do you have a cough, shortness of breath or wheezing?  7. yes - Do you sometimes get pains in the calves of your legs when you walk? Left calf related to her osteoarthritis of knee.  8. NO - Do you or anyone in your family have previous history of blood clots?  9. NO - Do you or does anyone in your family have a serious bleeding problem such as prolonged bleeding following surgeries or cuts?  10. NO - Have you ever had problems with anemia or been told to take iron pills?  11. NO - Have you had any abnormal blood loss such as black, tarry or bloody stools, or abnormal vaginal bleeding?  12. NO - Have you ever had a blood transfusion?  13. yes - Have you or any of your relatives ever had  problems with anesthesia?  Has had vomiting with coming out of anesthesia in past.  14. NO - Do you have sleep apnea, excessive snoring or daytime drowsiness?  15. NO - Do you have any prosthetic heart valves?  16. NO - Do you have prosthetic joints?  17. NO - Is there any chance that you may be pregnant?      HPI:     HPI related to upcoming procedure: Arlyn has been having a lot of pain in her right shoulder, particularly when she lays on that side.  She was found to have a full-thickness tear of her supraspinatus.  She also has degenerative disease of the AC joint causing impingement.    Arlyn is also here for follow-up of chronic medical problems as well.  Had a lot of pain in her left knee as well.  This pain extends into her left calf.  She was noted on MRI several months ago to have a large Baker's cyst that is leaking.  Discussed that the pain that radiates into her calf is likely from this Baker's cyst.    She also was noted to have a small dilation of her ascending aorta on CT calcium score 2 years ago.  We discussed follow-up with echo.      MEDICAL HISTORY:     Patient Active Problem List    Diagnosis Date Noted     Complete tear of right rotator cuff 03/01/2018     Priority: Medium     Impingement syndrome of right shoulder 03/01/2018     Priority: Medium     AC (acromioclavicular) arthritis 03/01/2018     Priority: Medium     Enthesopathy of hip region 02/15/2018     Priority: Medium     Degeneration of cervical intervertebral disc 02/15/2018     Priority: Medium     Overview:   Multilevel degenerative disk disease in cervical lumbar spine without   radiculopathy.  Add also possible right cubital tunnel syndrome at elbow.       Chondromalacia of patella 02/15/2018     Priority: Medium     Hypertension 02/15/2018     Priority: Medium     Enthesopathy of ankle and tarsus 02/15/2018     Priority: Medium     Symptomatic menopausal or female climacteric states 02/15/2018     Priority: Medium     Overview:    Postmenopausal now off hormone replacement therapy.       Acquired deformity of ankle and foot 02/15/2018     Priority: Medium     Other joint derangement, not elsewhere classified, unspecified site 02/15/2018     Priority: Medium     Overview:   preloader comment: selections available to preload differs from paper chart.    *Subluxation L3-L4 with mild stenosis       Uterine prolapse 02/15/2018     Priority: Medium     Overview:   preloader comment: selections available to preload differs from paper chart.    *Pelvic floor relaxation       Ascending aorta dilatation (H) 03/23/2016     Priority: Medium     Overview:   3.5-3.6 cm on CT 3/2016 - follow up in 1 year       Health care maintenance 03/12/2015     Priority: Medium     Arthritis of right wrist 11/07/2013     Priority: Medium     GERD (gastroesophageal reflux disease) 12/03/2012     Priority: Medium     Tendonitis of wrist, right 12/03/2012     Priority: Medium     Hyperlipidemia 10/04/2011     Priority: Medium     Osteoarthritis of ankle or foot 10/04/2011     Priority: Medium     Plantar fasciitis 10/04/2011     Priority: Medium     Disorder of skin or subcutaneous tissue 10/04/2011     Priority: Medium     Carpal tunnel syndrome 11/18/2010     Priority: Medium     Disorder of bursae and tendons in shoulder region 11/18/2010     Priority: Medium     Lesion of lateral popliteal nerve 10/11/2006     Priority: Medium     Cervical spondylosis without myelopathy 10/04/2006     Priority: Medium     Lumbosacral spondylosis without myelopathy 10/04/2006     Priority: Medium      Past Medical History:   Diagnosis Date     AC (acromioclavicular) arthritis 3/1/2018     Bunion of great toe of right foot     No Comments Provided     Carpal tunnel syndrome     11/18/2010     Complete tear of right rotator cuff 3/1/2018     Essential (primary) hypertension     No Comments Provided     Gastro-esophageal reflux disease without esophagitis     12/3/2012     Hyperlipidemia      10/4/2011     Impingement syndrome of right shoulder     10/09/07     Personal history of other medical treatment (CODE)      3, Para 3 with three vaginal deliveries     Thoracic aortic ectasia (H)     3/23/2016,3.5-3.6 cm on CT 3/2016 - follow up in 1 year     Uterovaginal prolapse     preloader comment: selections available to preload differs from paper chart.   *Pelvic floor relaxation     Past Surgical History:   Procedure Laterality Date     BUNIONECTOMY      ,right     COLONOSCOPY      , normal     COLONOSCOPY      3/16/15,F/U      HYSTERECTOMY VAGINAL      ,for uterine prolapse with A&P repair.  Tube and ovaries still in place.     LAPAROSCOPY DIAGNOSTIC (GYN)      ,for pelvic adhesion     LAPAROSCOPY DIAGNOSTIC (GYN)      ,vaginal vault prolapse - Dr. Mumtaz Marshall - anterior/posterior repair with bladder mesh.     RELEASE CARPAL TUNNEL      Dr. Méndez     Current Outpatient Prescriptions   Medication Sig Dispense Refill     aspirin (ASPIRIN LOW DOSE) 81 MG tablet Take by mouth daily 30 tablet      Multiple Vitamins-Minerals (MENS MULTIVITAMIN PLUS) TABS  30 tablet      Calcium-Vitamin D-Vitamin K (CALCIUM + D) 500-1000-40 MG-UNT-MCG CHEW        atorvastatin (LIPITOR) 20 MG tablet Take 1 tablet (20 mg) by mouth daily 90 tablet 3     hydrochlorothiazide (HYDRODIURIL) 25 MG tablet Take 1 tablet (25 mg) by mouth daily 90 tablet 3     Potassium Chloride ER 20 MEQ TBCR Take 1 tablet (20 mEq) by mouth daily with food 90 tablet 3     Cod Liver Oil 5000-500 UNIT/5ML OIL        Cyanocobalamin (VITAMIN B-12 CR) 1000 MCG TBCR        diclofenac (VOLTAREN) 1 % GEL topical gel Apply topically 2 times daily       Flaxseed, Linseed, (EQL FLAXSEED OIL) 1200 MG CAPS Take 1 capsule by mouth 3 times daily       lecithin 400 MG CAPS Take 1 capsule by mouth 2 times daily       omega-3 acid ethyl esters (LOVAZA) 1 G capsule Take 1,000 mg by mouth       triamcinolone (KENALOG) 0.1 % cream Apply  "topically 2 times daily       vitamin E (TOCOPHEROL) 100 UNIT capsule        [DISCONTINUED] atorvastatin (LIPITOR) 20 MG tablet Take 20 mg by mouth daily       [DISCONTINUED] hydrochlorothiazide (HYDRODIURIL) 25 MG tablet Take 25 mg by mouth daily       OTC products: Aspirin and NSAIDS    Allergies   Allergen Reactions     Codeine Nausea and Vomiting     Diazepam      Other reaction(s): Hallucinations     Naproxen Other (See Comments)     Mouth sores     Sulfa Drugs Unknown     Sulfacetamide Rash     Had a reaction to sulfa drug but is not sure which one it was-states the reaction resulted in redness      Latex Allergy: NO    Social History   Substance Use Topics     Smoking status: Passive Smoke Exposure - Never Smoker     Smokeless tobacco: Never Used      Comment: Quit smoking:  was a smoker     Alcohol use No     History   Drug Use Not on file     Comment: Drug use: No       REVIEW OF SYSTEMS:   CONSTITUTIONAL: NEGATIVE for fever, chills, change in weight  INTEGUMENTARY/SKIN: NEGATIVE for worrisome rashes, moles or lesions  EYES: NEGATIVE for vision changes or irritation  ENT/MOUTH: NEGATIVE for ear, mouth and throat problems  RESP: NEGATIVE for significant cough or SOB  BREAST: NEGATIVE for masses, tenderness or discharge  CV: NEGATIVE for chest pain, palpitations or peripheral edema  GI: NEGATIVE for nausea, abdominal pain, heartburn, or change in bowel habits  : NEGATIVE for frequency, dysuria, or hematuria  MUSCULOSKELETAL: Joint pains as noted above.  NEURO: NEGATIVE for weakness, dizziness or paresthesias  ENDOCRINE: NEGATIVE for temperature intolerance, skin/hair changes  HEME: NEGATIVE for bleeding problems  PSYCHIATRIC: NEGATIVE for changes in mood or affect    EXAM:   /82 (BP Location: Right arm, Patient Position: Sitting, Cuff Size: Adult Regular)  Pulse 75  Temp 97.5  F (36.4  C) (Tympanic)  Ht 4' 10.5\" (1.486 m)  Wt 146 lb (66.2 kg)  SpO2 97%  BMI 29.99 kg/m2    GENERAL " APPEARANCE: healthy, alert and no distress     EYES: EOMI, PERRL     HENT: ear canals and TM's normal and nose and mouth without ulcers or lesions     NECK: no adenopathy, no asymmetry, masses, or scars and thyroid normal to palpation     RESP: lungs clear to auscultation - no rales, rhonchi or wheezes     CV: regular rates and rhythm, normal S1 S2, no S3 or S4 and no murmur, click or rub     BREAST: No masses palpable.  No skin changes, tethering or axillary lymphadenopathy noted.     ABDOMEN:  soft, nontender, no HSM or masses and bowel sounds normal     MS: extremities normal- no gross deformities noted.  Swelling of left knee noted.  Tenderness in the popliteal fossa region.     SKIN: no suspicious lesions or rashes     NEURO: Normal strength and tone, sensory exam grossly normal, mentation intact and speech normal     PSYCH: mentation appears normal. and affect normal/bright     LYMPHATICS: No cervical adenopathy    DIAGNOSTICS:   EKG: Notes Recorded by Brooke Castaneda MD on 6/16/2017 at 3:43 PM Discussed in clinic. Brooke Castaneda MD ....................  6/16/2017   3:43 PM ------  Notes Recorded by Katarina Tinoco DO on 6/16/2017 at 1:35 PM EKG Interpretation: Rhythm:   Sinus bradycardia Rate:  59 Axis: Normal QRS interval: Normal Conduction: Normal ST Segments: Normal QT interval: Normal APC's Present: No VPC's Present: No  Impression: Normal EKG.  When compared to previous tracing dated March 9, 2016, unchanged other   than rate.  Katarina Tinoco DO    Results for orders placed or performed in visit on 03/29/18   Lipid Profile   Result Value Ref Range    Cholesterol 126 <200 mg/dL    Triglycerides 69 <150 mg/dL    HDL Cholesterol 54 23 - 92 mg/dL    LDL Cholesterol Calculated 58 <100 mg/dL    Non HDL Cholesterol 72 <130 mg/dL   Comprehensive metabolic panel   Result Value Ref Range    Sodium 139 134 - 144 mmol/L    Potassium 3.6 3.5 - 5.1 mmol/L    Chloride 103 98 - 107 mmol/L    Carbon Dioxide 28  21 - 31 mmol/L    Anion Gap 8 3 - 14 mmol/L    Glucose 100 70 - 105 mg/dL    Urea Nitrogen 23 7 - 25 mg/dL    Creatinine 0.89 0.60 - 1.20 mg/dL    GFR Estimate 63 >60 mL/min/1.7m2    GFR Estimate If Black 76 >60 mL/min/1.7m2    Calcium 9.8 8.6 - 10.3 mg/dL    Bilirubin Total 0.8 0.3 - 1.0 mg/dL    Albumin 4.4 3.5 - 5.7 g/dL    Protein Total 7.4 6.4 - 8.9 g/dL    Alkaline Phosphatase 65 34 - 104 U/L    ALT 17 7 - 52 U/L    AST 18 13 - 39 U/L   TSH GH   Result Value Ref Range    Thyrotropin 2.44 0.34 - 5.60 IU/mL   CBC with platelets and differential   Result Value Ref Range    WBC 6.1 4.0 - 11.0 10e9/L    RBC Count 4.61 3.8 - 5.2 10e12/L    Hemoglobin 13.6 11.7 - 15.7 g/dL    Hematocrit 40.6 35.0 - 47.0 %    MCV 88 78 - 100 fl    MCH 29.5 26.5 - 33.0 pg    MCHC 33.5 31.5 - 36.5 g/dL    RDW 13.2 10.0 - 15.0 %    Platelet Count 266 150 - 450 10e9/L    Diff Method Automated Method     % Neutrophils 54.6 %    % Lymphocytes 28.6 %    % Monocytes 11.3 %    % Eosinophils 4.3 %    % Basophils 1.0 %    % Immature Granulocytes 0.2 %    Absolute Neutrophil 3.3 1.6 - 8.3 10e9/L    Absolute Lymphocytes 1.7 0.8 - 5.3 10e9/L    Absolute Monocytes 0.7 0.0 - 1.3 10e9/L    Absolute Eosinophils 0.3 0.0 - 0.7 10e9/L    Absolute Basophils 0.1 0.0 - 0.2 10e9/L    Abs Immature Granulocytes 0.0 0 - 0.4 10e9/L                                                                              IMPRESSION:   Reason for surgery/procedure: Right rotator cuff tear  Diagnosis/reason for consult: Hypertension    The proposed surgical procedure is considered INTERMEDIATE risk.    REVISED CARDIAC RISK INDEX  The patient has the following serious cardiovascular risks for perioperative complications such as (MI, PE, VFib and 3  AV Block):  No serious cardiac risks  INTERPRETATION: 1 risks: Class II (low risk - 0.9% complication rate)    The patient has the following additional risks for perioperative complications:  No identified additional risks       ICD-10-CM    1. Preop general physical exam Z01.818    2. Rotator cuff tear arthropathy of right shoulder M12.811 PHYSICAL THERAPY REFERRAL   3. Chronic pain of left knee M25.562 PHYSICAL THERAPY REFERRAL    G89.29    4. Ascending aorta dilatation (H) I77.810 Echocardiogram Complete   5. Gastroesophageal reflux disease without esophagitis K21.9 CBC with platelets and differential   6. Pure hypercholesterolemia E78.00 Lipid Profile     Comprehensive metabolic panel     atorvastatin (LIPITOR) 20 MG tablet   7. Essential hypertension I10 TSH GH     hydrochlorothiazide (HYDRODIURIL) 25 MG tablet     Potassium Chloride ER 20 MEQ TBCR   8. Health care maintenance Z00.00    9. Encounter for screening mammogram for breast cancer Z12.31 MA Screening Digital Bilateral       RECOMMENDATIONS:     Do not use aspirin, ibuprofen or aleve (naproxen) for at least a week prior to your surgery.  It's ok to take tylenol if needed for pain.    The morning of surgery, take your potassium supplement and your hydrochlorothiazide only.  Hold everything else until later in the day.      APPROVAL GIVEN to proceed with proposed procedure, without further diagnostic evaluation     3.  With her rotator cuff tear and her significant arthritis in her left leg, she is worried about how she is going to get up from seated position and out of bed if her right arm is in a sling.  I did refer her to physical therapy.  Hopefully they can see her prior to her surgery to help her make plans.  4.  Repeat echo ordered to recheck this.  5.  Recheck CBC.  6.  Labs as above.  Atorvastatin refilled.  7.  Blood pressure is stable.  Check TSH as above.  HCTZ and potassium chloride were refilled.  8.  Mammogram as noted below.  She is due for a DEXA scan later this year and will address this at next years physical.  Colonoscopy is up-to-date.  Pap deferred due to age greater than 65 and previous hysterectomy.  Vaccines are up-to-date.  9.  Mammogram ordered as  above.  Want to do this in about 6 months to allow for time for her shoulder to heal.  Signed Electronically by: Brooke Castaneda MD    Copy of this evaluation report is provided to requesting physician.    [facility] Preop [email or web address]

## 2018-04-11 NOTE — INTERVAL H&P NOTE
History and Physical Update    The history and physical has been reviewed and the patient's right shoulder has been examined.  There are no interim changes to the patient's history or physical condition.  She is ready to proceed with planned procedure.  She understands the risks and benefits and once again these where outlined.    Wilner Painting DO  Orthopedic Surgeon    4/11/2018 8:53 AM

## 2018-04-11 NOTE — PROCEDURES
Right Interscalene Brachial Plexus block for post-op pain management.    Patient: DAHIANA LLAMAS  Patient : 1950  Date: 2018Procedure time:  0940 to 0950  Diagnosis: Shoulder Pain.  Indication: Surgeon requestsInterscalene Brachial plexus block for post-op pain management.      The procedure, potential benefits, risks, and alternatives were discussed during the preoperative interview with the patient. She voiced understanding of the information and agreed to proceed with the procedure.    The Universal protocol (verbal participation with the assistant) was followed to verify the patient's identity, the surgicalprocedure, the regional anesthesia procedure, including the correct site and laterality.    Anesthesia: 1% lidocaine subcutaneously    The Right neck was prepped with Chloroprep.  A 22 gauge stimuplexinsulated needle was advanced with ultrasound guidance to approximately 2 cm depth.  30ml of 0.5% Ropivicaine with 2mg  Preservative Free Decadron was injected incrementally, with confirmation of negative aspiration.  No parasthesias were elicited either during needle placement or medication injection.  There were no other immediate complications apparent. An image was saved to the ultrasound memory.      Baljinder Saldivar DO on 2018 at 10:00 AM

## 2018-04-11 NOTE — OR NURSING
PACU Transfer Note    Arlyn Holder was transferred to DSU via cart.  Equipment used for transport:  none  Accompanied by:  RN    PACU Respiratory Event Documentation     1) Episodes of Apnea greater than or equal to 10 seconds: no    2) Bradypnea - less than 8 breaths per minute: no    3) Pain score on 0 to 10 scale: 2/10 behind shoulder  4) Pain-sedation mismatch (yes or no): no/    5) Repeated 02 desaturation less than 90% (yes or no): no    Anesthesia notified? (yes or no): no    Any of the above events occuring repeatedly in separate 30 minute intervals may be considered recurrent PACU respiratory events.    Patient stable and meets phase 1 discharge criteria for transport from PACU.

## 2018-04-11 NOTE — ANESTHESIA POSTPROCEDURE EVALUATION
Patient: Arlyn Holder    Procedure(s):  Right Shoulder Arthroscopy with Subacromial Decompression, Distal Clavicle Excision, Rotator Cuff Repair, Biceps Tenotomy, Extensive Debridement - Wound Class: I-Clean    Diagnosis:right impingement syndrome, ac arthritis, rotator cuff tear  Diagnosis Additional Information: No value filed.    Anesthesia Type:  General, LMA, Periph. Nerve Block for postop pain    Note:  Anesthesia Post Evaluation    Patient location during evaluation: PACU  Patient participation: Able to fully participate in evaluation  Level of consciousness: awake and alert  Pain management: adequate  Airway patency: patent  Cardiovascular status: acceptable  Respiratory status: acceptable  Hydration status: acceptable  PONV: none     Anesthetic complications: None          Last vitals:  Vitals:    04/11/18 1300 04/11/18 1315 04/11/18 1330   BP: 157/88 148/85 146/84   Pulse:      Resp:      Temp:      SpO2: 98% 97%          Electronically Signed By: Baljinder Saldivar DO  April 11, 2018  1:38 PM

## 2018-04-11 NOTE — IP AVS SNAPSHOT
MRN:[id number]                      After Visit Summary   4/11/2018    Arlyn Holder    MRN: [id number]           Thank you!     Thank you for choosing [facility] for your care. Our goal is always to provide you with excellent care. Hearing back from our patients is one way we can continue to improve our services. Please take a few minutes to complete the written survey that you may receive in the mail after you visit with us. Thank you!        Patient Information     Date Of Birth          1950        About your hospital stay     You were admitted on:  April 11, 2018 You last received care in the:  [facility]    You were discharged on:  April 11, 2018       Who to Call     For medical emergencies, please call 911.  For non-urgent questions about your medical care, please call your primary care provider or clinic, [phone]  For questions related to your surgery, please call your surgery clinic        Attending Provider     Provider Specialty    Wilner Painting, DO Orthopaedic Surgery       Primary Care Provider Office Phone # Fax #    Brooke Anastasiya Castaneda [phone]      After Care Instructions      Diet as Tolerated       Return to diet before surgery, unless instructed otherwise.            Discharge Instructions       Review outpatient procedure discharge instructions with patient as directed by Provider            Discharge Instructions - Lifting Limit (specify)       Lifting limit 2 pounds until seen at Post-op follow up appointment.            Ice to affected area       Ice pack to surgical site every 30 minutes per hour for the next week.  Suggest ice pack into the armpit area as well.            Remove dressing - at 48 hours       May shower and let water go over incisions, pat dry and do not place antibiotic creams or lotions or band aids over the incisions.            Return to clinic       Return to clinic 3-5 days                 "  Your next 10 appointments already scheduled     2018 10:15 AM CDT   Return Visit with Wilner Painting DO   [facility] ([facility])    [address]  [place] [id number] [phone]              Pending Results     No orders found from 2018 to 2018.            Admission Information     Date & Time Provider Department Dept. Phone    2018 Wilner Painting,  [facility] [phone]      Your Vitals Were     Blood Pressure Pulse Temperature Respirations Height Weight    136/79 (Cuff Size: Adult Regular) 81 97.6  F (36.4  C) (Temporal) 16 1.492 m (4' 10.75\") 62.5 kg (137 lb 12.8 oz)    Pulse Oximetry BMI (Body Mass Index)                96% 28.07 kg/m2          [employer] Information     Lola Pirindola lets you send messages to your doctor, view your test results, renew your prescriptions, schedule appointments and more. To sign up, go to [email or web address] . Click on \"Log in\" on the left side of the screen, which will take you to the Welcome page. Then click on \"Sign up Now\" on the right side of the page.     You will be asked to enter the access code listed below, as well as some personal information. Please follow the directions to create your username and password.     Your access code is: [email or web address]  Expires: 2018 12:53 PM     Your access code will  in 90 days. If you need help or a new code, please call your [facility] clinic or [phone].        Care EveryWhere ID     This is your Care EveryWhere ID. This could be used by other organizations to access your [facility] medical records  [id number]        Equal Access to Services     BLAIRE MEIER : Owen Ruggiero, ale lau, qanan kaaruna mallory. So [facility] [phone].    ATENCIÓN: Si habla español, tiene a frank disposición servicios gratuitos de asistencia lingüística. Llame al " [phone].    We comply with applicable federal civil rights laws and [place] laws. We do not discriminate on the basis of race, color, national origin, age, disability, sex, sexual orientation, or gender identity.               Review of your medicines      START taking        Dose / Directions    cephALEXin 500 MG capsule   Commonly known as:  KEFLEX        Dose:  500 mg   Take 1 capsule (500 mg) by mouth 4 times daily for 5 days One tab every 6 hours until gone.  Take with food.   Quantity:  20 capsule   Refills:  0       HYDROcodone-acetaminophen  MG per tablet   Commonly known as:  NORCO        Take 1/2 tab every 1 1/2 hours PRN pain.  Take with food, never on an empty stomach.   Quantity:  60 tablet   Refills:  0       ondansetron 4 MG ODT tab   Commonly known as:  ZOFRAN-ODT        Dose:  4 mg   Take 1 tablet (4 mg) by mouth every 8 hours as needed for nausea Dissolve ON the tongue.   Quantity:  20 tablet   Refills:  0         CONTINUE these medicines which have NOT CHANGED        Dose / Directions    ASPIRIN LOW DOSE 81 MG tablet   Generic drug:  aspirin        Take by mouth daily   Quantity:  30 tablet   Refills:  0       atorvastatin 20 MG tablet   Commonly known as:  LIPITOR   Used for:  Pure hypercholesterolemia        Dose:  20 mg   Take 1 tablet (20 mg) by mouth daily   Quantity:  90 tablet   Refills:  3       CALCIUM + D 500-1000-40 MG-UNT-MCG Chew   Generic drug:  Calcium-Vitamin D-Vitamin K        Refills:  0       Cod Liver Oil 5000-500 UNIT/5ML Oil        Refills:  0       diclofenac 1 % Gel topical gel   Commonly known as:  VOLTAREN        Apply topically 2 times daily   Refills:  0       EQL FLAXSEED OIL 1200 MG Caps        Dose:  1 capsule   Take 1 capsule by mouth 3 times daily   Refills:  0       hydrochlorothiazide 25 MG tablet   Commonly known as:  HYDRODIURIL   Used for:  Essential hypertension        Dose:  25 mg   Take 1 tablet (25 mg) by mouth daily   Quantity:  90 tablet    Refills:  3       lecithin 400 MG Caps        Dose:  1 capsule   Take 1 capsule by mouth 2 times daily   Refills:  0       MENS MULTIVITAMIN PLUS Tabs        Quantity:  30 tablet   Refills:  0       omega-3 acid ethyl esters 1 g capsule   Commonly known as:  Lovaza        Dose:  1000 mg   Take 1,000 mg by mouth   Refills:  0       Potassium Chloride ER 20 MEQ Tbcr   Used for:  Essential hypertension        Dose:  20 mEq   Take 1 tablet (20 mEq) by mouth daily with food   Quantity:  90 tablet   Refills:  3       triamcinolone 0.1 % cream   Commonly known as:  KENALOG        Apply topically 2 times daily   Refills:  0       Vitamin B-12 CR 1000 MCG Tbcr        Refills:  0       vitamin E 100 UNIT capsule   Commonly known as:  TOCOPHEROL        Refills:  0            Where to get your medicines      These medications were sent to [facility], - [place], [place] - [address]  [address], [place] [address]     Phone:  [phone]     cephALEXin 500 MG capsule         Some of these will need a paper prescription and others can be bought over the counter. Ask your nurse if you have questions.     Bring a paper prescription for each of these medications     HYDROcodone-acetaminophen  MG per tablet    ondansetron 4 MG ODT tab                Protect others around you: Learn how to safely use, store and throw away your medicines at www.disposemymeds.org.        ANTIBIOTIC INSTRUCTION     You've Been Prescribed an Antibiotic - Now What?  Your healthcare team thinks that you or your loved one might have an infection. Some infections can be treated with antibiotics, which are powerful, life-saving drugs. Like all medications, antibiotics have side effects and should only be used when necessary. There are some important things you should know about your antibiotic treatment.      Your healthcare team may run tests before you start taking an antibiotic.    Your team may take  samples (e.g., from your blood, urine or other areas) to run tests to look for bacteria. These test can be important to determine if you need an antibiotic at all and, if you do, which antibiotic will work best.      Within a few days, your healthcare team might change or even stop your antibiotic.    Your team may start you on an antibiotic while they are working to find out what is making you sick.    Your team might change your antibiotic because test results show that a different antibiotic would be better to treat your infection.    In some cases, once your team has more information, they learn that you do not need an antibiotic at all. They may find out that you don't have an infection, or that the antibiotic you're taking won't work against your infection. For example, an infection caused by a virus can't be treated with antibiotics. Staying on an antibiotic when you don't need it is more likely to be harmful than helpful.      You may experience side effects from your antibiotic.    Like all medications, antibiotics have side effects. Some of these can be serious.    Let you healthcare team know if you have any known allergies when you are admitted to the hospital.    One significant side effect of nearly all antibiotics is the risk of severe and sometimes deadly diarrhea caused by Clostridium difficile (C. Difficile). This occurs when a person takes antibiotics because some good germs are destroyed. Antibiotic use allows C. diificile to take over, putting patients at high risk for this serious infection.    As a patient or caregiver, it is important to understand your or your loved one's antibiotic treatment. It is especially important for caregivers to speak up when patients can't speak for themselves. Here are some important questions to ask your healthcare team.    What infection is this antibiotic treating and how do you know I have that infection?    What side effects might occur from this  antibiotic?    How long will I need to take this antibiotic?    Is it safe to take this antibiotic with other medications or supplements (e.g., vitamins) that I am taking?     Are there any special directions I need to know about taking this antibiotic? For example, should I take it with food?    How will I be monitored to know whether my infection is responding to the antibiotic?    What tests may help to make sure the right antibiotic is prescribed for me?      Information provided by:  www.cdc.gov/getsmart  U.S. Department of Health and Human Services  Centers for disease Control and Prevention  National Center for Emerging and Zoonotic Infectious Diseases  Division of Healthcare Quality Promotion        Information about OPIOIDS     PRESCRIPTION OPIOIDS: WHAT YOU NEED TO KNOW    Prescription opioids can be used to help relieve moderate to severe pain and are often prescribed following a surgery or injury, or for certain health conditions. These medications can be an important part of treatment but also come with serious risks. It is important to work with your health care provider to make sure you are getting the safest, most effective care.    WHAT ARE THE RISKS AND SIDE EFFECTS OF OPIOID USE?  Prescription opioids carry serious risks of addiction and overdose, especially with prolonged use. An opioid overdose, often marked by slowed breathing can cause sudden death. The use of prescription opioids can have a number of side effects as well, even when taken as directed:      Tolerance - meaning you might need to take more of a medication for the same pain relief    Physical dependence - meaning you have symptoms of withdrawal when a medication is stopped    Increased sensitivity to pain    Constipation    Nausea, vomiting, and dry mouth    Sleepiness and dizziness    Confusion    Depression    Low levels of testosterone that can result in lower sex drive, energy, and strength    Itching and sweating    RISKS ARE  GREATER WITH:    History of drug misuse, substance use disorder, or overdose    Mental health conditions (such as depression or anxiety)    Sleep apnea    Older age (65 years or older)    Pregnancy    Avoid alcohol while taking prescription opioids.   Also, unless specifically advised by your health care provider, medications to avoid include:    Benzodiazepines (such as Xanax or Valium)    Muscle relaxants (such as Soma or Flexeril)    Hypnotics (such as Ambien or Lunesta)    Other prescription opioids    KNOW YOUR OPTIONS:  Talk to your health care provider about ways to manage your pain that do not involve prescription opioids. Some of these options may actually work better and have fewer risks and side effects:    Pain relievers such as acetaminophen, ibuprofen, and naproxen    Some medications that are also used for depression or seizures    Physical therapy and exercise    Cognitive behavioral therapy, a psychological, goal-directed approach, in which patients learn how to modify physical, behavioral, and emotional triggers of pain and stress    IF YOU ARE PRESCRIBED OPIOIDS FOR PAIN:    Never take opioids in greater amounts or more often than prescribed    Follow up with your primary health care provider and work together to create a plan on how to manage your pain.    Talk about ways to help manage your pain that do not involve prescription opioids    Talk about all concerns and side effects    Help prevent misuse and abuse    Never sell or share prescription opioids    Never use another person's prescription opioids    Store prescription opioids in a secure place and out of reach of others (this may include visitors, children, friends, and family)    Visit www.cdc.gov/drugoverdose to learn about risks of opioid abuse and overdose    If you believe you may be struggling with addiction, tell your health care provider and ask for guidance or call [facility]'s National Helpline at 9-581-028-HELP    LEARN MORE /  www.cdc.gov/drugoverdose/prescribing/guideline.html    Safely dispose of unused prescription opioids: Find your local drug take-back programs and more information about the importance of safe disposal at www.doseofreality.[place].gov             Medication List: This is a list of all your medications and when to take them. Check marks below indicate your daily home schedule. Keep this list as a reference.      Medications           Morning Afternoon Evening Bedtime As Needed    ASPIRIN LOW DOSE 81 MG tablet   Take by mouth daily   Generic drug:  aspirin                                atorvastatin 20 MG tablet   Commonly known as:  LIPITOR   Take 1 tablet (20 mg) by mouth daily                                CALCIUM + D 500-1000-40 MG-UNT-MCG Chew   Generic drug:  Calcium-Vitamin D-Vitamin K                                cephALEXin 500 MG capsule   Commonly known as:  KEFLEX   Take 1 capsule (500 mg) by mouth 4 times daily for 5 days One tab every 6 hours until gone.  Take with food.                                Cod Liver Oil 5000-500 UNIT/5ML Oil                                diclofenac 1 % Gel topical gel   Commonly known as:  VOLTAREN   Apply topically 2 times daily                                EQL FLAXSEED OIL 1200 MG Caps   Take 1 capsule by mouth 3 times daily                                hydrochlorothiazide 25 MG tablet   Commonly known as:  HYDRODIURIL   Take 1 tablet (25 mg) by mouth daily                                HYDROcodone-acetaminophen  MG per tablet   Commonly known as:  NORCO   Take 1/2 tab every 1 1/2 hours PRN pain.  Take with food, never on an empty stomach.                                lecithin 400 MG Caps   Take 1 capsule by mouth 2 times daily                                MENS MULTIVITAMIN PLUS Tabs                                omega-3 acid ethyl esters 1 g capsule   Commonly known as:  Lovaza   Take 1,000 mg by mouth                                ondansetron 4 MG ODT tab    Commonly known as:  ZOFRAN-ODT   Take 1 tablet (4 mg) by mouth every 8 hours as needed for nausea Dissolve ON the tongue.                                Potassium Chloride ER 20 MEQ Tbcr   Take 1 tablet (20 mEq) by mouth daily with food                                triamcinolone 0.1 % cream   Commonly known as:  KENALOG   Apply topically 2 times daily                                Vitamin B-12 CR 1000 MCG Tbcr                                vitamin E 100 UNIT capsule   Commonly known as:  TOCOPHEROL

## 2018-04-11 NOTE — OP NOTE
Procedure Date: 04/11/2018      PREOPERATIVE DIAGNOSES:     1.  Complete tear of the right supraspinatus.   2.  Impingement syndrome.   3.  Acromioclavicular arthritis.      POSTOPERATIVE DIAGNOSES:     1.  Complete tear of the supraspinatus.   2.  Labral degenerative tearing.   3.  Fraying of the long head of the biceps with subluxation.   4.  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     5.  Impingement syndrome.   6.  Acromioclavicular arthritis.      PROCEDURE:   1.  Right shoulder arthroscopy with extensive debridement to include 3% of the anterior to posterior labrum (DOS 04/11/2018).   2.  Arthroscopic extensive debridement 2% of the supraspinatus, done from the subacromial space.   3.  Arthroscopic biceps tenotomy.   4.  Arthroscopic chondroplasty of the humeral head and glenoid.   5.  Arthroscopic subacromial decompression.   6.  Arthroscopic distal clavicle excision with 16 mm width of resection completed.   7.  Arthroscopic rotator cuff repair performed with a double row technique with 1 medial row 5.5 mm Arthrex BioComposite SwiveLock anchor with 1 fiber cord and 1 other centralized FiberLink and lateral row fixation with a 5.5 mm BioComposite Arthrex SwiveLock anchor.      IMPLANTS:  As above.      SURGEON:  Wilner Painting DO      ASSISTANT:  None.      ANESTHESIA:  General via LMA as well as interscalene block for pain control.      ESTIMATED BLOOD LOSS:  Less than 5.      FLUIDS:  See chart.      DRAINS:  None.      COMPLICATIONS:  None.      DISPOSITION:  Stable to postop.      INDICATIONS:  The patient is a 67-year-old female who had been having ongoing complaints of pain about her right shoulder.  She had struggled with conservative measures, eventually underwent an MR arthrogram which showed a large tear in her rotator cuff.  She elected for surgical intervention.  She had been working on her range of motion exercises as best she could.      PROCEDURE NOTE:  After  informed consent was received from the patient having listed all the risks and benefits as noted on the consent form but not limited to the consent form and having discussed all conservative surgical and alternatives to treatment, the patient signed a consent form with a witness present.  The patient understood there were numerous risks, all of them were not written done but were discussed only.  No guarantees were given.  All questions were answered prior to signing consent form.  The patient was given antibiotics, one half hour prior to skin incision.  She also received an interscalene block per Anesthesia protocol.  She was taken back to the operating room supine on gurney, transferred to the operating table, secured and all bony prominences were padded.  She was then given general anesthesia via LMA.  Once proper anesthesia was obtained, I examined her shoulder.  She had good stability.  She was repositioned to beachchair head and neck position by Anesthesia, bony prominences checked, padded once again and she was secured to the operating room table.  The patient's right upper extremity was sterilely prepped and draped.      A timeout was performed according to institutional guidelines.  With this done, I then marked my incision site and made my posterior skin incision with a #11 scalpel.  Blunt trocar and cannula were inserted.  Trocar removed and a [employer] video lens was placed.  Beginning in the glenohumeral joint, right away it was noted that she had a large tear of full thickness through the supraspinatus.  I could see into the subacromial space.  She had degenerative labral tearing.  Long head of the biceps appeared subluxed as well as had tearing into it.  No loose bodies in the inferior pouch but there was some grade 2 chondromalacia of the humeral head and glenoid smaller areas.  Utilizing inside-out technique, I made my anterior portal site.  With the probe in place, once again noted above-mentioned  structures.  I then inserted my arthroscopic scissors and performed an arthroscopic biceps tenotomy.  I then inserted my shaver and performed extensive debridement.  About 3% of the anterior to posterior labrum was removed.  This was probed and found to be very stable.  I then performed a chondroplasty of the humeral head and glenoid.  Once again, this was probed and found to be very stable.  I then irrigated everything copiously, reviewed it once more, brought my viewing lens anteriorly, was happy with my debridements and I then removed my instruments.        I repositioned my instruments into the subacromial space.  I performed extensive debridement and bursectomy.  I could see the large type 2 acromial hook, narrowing of the AC joint with inferior osteophytes.  A large tear of the rotator cuff once again was identified.  I made a lateral portal under direct visualization, brought my viewing lens laterally and brought my bur in posteriorly and performed a subacromial decompression.  I then reversed my viewing and working portals and removed more of the anterolateral corner of the acromion until I had it back to a type 1 reconfiguration.  I then removed the inferior aspect of the distal clavicle, brought my  bur in anteriorly and performed the last distal clavicle excision until 16 mm width of resection was completed when measured.  I checked this through the anterior and posterior portal sites.  Satisfied with my distal clavicle excision, I then copiously irrigated the area and turned my attention now back to the rotator cuff.  Here, with my viewing lens posteriorly, I brought my shaver in through that lateral portal site and performed extensive debridement of the supraspinatus; 2% of this was removed to get it back to good edges.  I then burred along the tuberosity to get better bleeding bone.  Having cleaned this all up, I made a more anterior lateral portal site under direct visualization, placed 1 Arthrex  passport and then placed my first 5.5 mm BioComposite SwiveLock anchor with both FiberTapes and passed these 2 FiberTapes.  I elected to place a centralized suture length which had a loop in it.  I then brought all 3 sutures out to a lateral anchor, which was another 5.5 mm BioComposite SwiveLock anchor, and placed a screw to manufacture specifications.  This reconstituted the rotator cuff very nicely with excellent coverage of the footprint.  I irrigated everything copiously, checked my repairs.  Satisfied with all this, I irrigated once more and removed my instruments.      Portal sites were closed deep with 3-0 Monocryl interrupted and all 4 skin incisions were closed with 3-0 nylon modified horizontal mattress fashion.      Sterile dressings including Xeroform, 4 x 4 and Tegaderm were placed.  She was placed into a slingshot 3, extubated and transferred back to [facility], taken to recovery room in satisfactory condition.  She will be discharged home per protocol.      She is given a prescription for Norco 10/325, #60, no refills and Keflex.  She is already taking an 81 mg aspirin.  I would like her to continue with this.  She was given a prescription for Zofran just in case she has any nausea.  She has a 2-pound weight restriction.  I had reviewed with her prior in preop how to utilize her elbow as well as precautions for her shoulder.  She verbalizes understanding.  She will receive occupational/PT evaluation again after surgery here.  She will follow up in my office as already arranged.         YVONNE OCONNELL DO             D: 2018   T: 2018   MT: CATHRYN      Name:     DAHIANA LLAMAS   MRN:      [id number]        Account:        [id number]   :      1950           Procedure Date: 2018      Document: [id number]

## 2018-04-11 NOTE — OR NURSING
Checked CS due to shaky left arm.  Warmer on.  Taking fluids.  CS is 179.  Will continue to monitor. Jailene Castañeda RN on 4/11/2018 at 2:57 PM

## 2018-04-11 NOTE — ANESTHESIA PREPROCEDURE EVALUATION
Anesthesia Evaluation     . Pt has had prior anesthetic. Type: General and MAC           ROS/MED HX    ENT/Pulmonary:  - neg pulmonary ROS     Neurologic:  - neg neurologic ROS     Cardiovascular:  - neg cardiovascular ROS   (+) hypertension----. : . . . :. .       METS/Exercise Tolerance:     Hematologic:  - neg hematologic  ROS       Musculoskeletal:  - neg musculoskeletal ROS       GI/Hepatic:  - neg GI/hepatic ROS   (+) GERD Asymptomatic on medication,       Renal/Genitourinary:  - ROS Renal section negative       Endo:  - neg endo ROS       Psychiatric:  - neg psychiatric ROS       Infectious Disease:  - neg infectious disease ROS       Malignancy:      - no malignancy   Other:    (+) No chance of pregnancy C-spine cleared: N/A, no H/O Chronic Pain,no other significant disability                    Physical Exam  Normal systems: cardiovascular, pulmonary and dental    Airway   Mallampati: II  TM distance: >3 FB  Neck ROM: full    Dental     Cardiovascular       Pulmonary                     Anesthesia Plan      History & Physical Review      ASA Status:  3 .    NPO Status:  > 8 hours    Plan for General, LMA and Periph. Nerve Block for postop pain Maintenance will be TIVA.    PONV prophylaxis:  Ondansetron (or other 5HT-3) and Dexamethasone or Solumedrol       Postoperative Care  Postoperative pain management:  IV analgesics and Peripheral nerve block (Single Shot).      Consents  Anesthetic plan, risks, benefits and alternatives discussed with: .  Use of blood products discussed: No .   .                         .

## 2018-04-17 ENCOUNTER — OFFICE VISIT (OUTPATIENT)
Dept: ORTHOPEDICS | Facility: OTHER | Age: 68
End: 2018-04-17
Attending: ORTHOPAEDIC SURGERY
Payer: MEDICARE

## 2018-04-17 ENCOUNTER — HOSPITAL ENCOUNTER (OUTPATIENT)
Dept: PHYSICAL THERAPY | Facility: OTHER | Age: 68
Setting detail: THERAPIES SERIES
End: 2018-04-17
Attending: ORTHOPAEDIC SURGERY
Payer: MEDICARE

## 2018-04-17 VITALS
TEMPERATURE: 98 F | SYSTOLIC BLOOD PRESSURE: 138 MMHG | DIASTOLIC BLOOD PRESSURE: 88 MMHG | WEIGHT: 137 LBS | BODY MASS INDEX: 27.91 KG/M2

## 2018-04-17 DIAGNOSIS — Z98.890 S/P ARTHROSCOPY OF SHOULDER: Primary | ICD-10-CM

## 2018-04-17 PROCEDURE — G8985 CARRY GOAL STATUS: HCPCS | Mod: GP,CI | Performed by: PHYSICAL THERAPIST

## 2018-04-17 PROCEDURE — 40000185 ZZHC STATISTIC PT OUTPT VISIT: Performed by: PHYSICAL THERAPIST

## 2018-04-17 PROCEDURE — G0463 HOSPITAL OUTPT CLINIC VISIT: HCPCS | Mod: 25

## 2018-04-17 PROCEDURE — 99024 POSTOP FOLLOW-UP VISIT: CPT | Performed by: ORTHOPAEDIC SURGERY

## 2018-04-17 PROCEDURE — G8984 CARRY CURRENT STATUS: HCPCS | Mod: GP,CN | Performed by: PHYSICAL THERAPIST

## 2018-04-17 PROCEDURE — 97161 PT EVAL LOW COMPLEX 20 MIN: CPT | Mod: GP | Performed by: PHYSICAL THERAPIST

## 2018-04-17 ASSESSMENT — PAIN SCALES - GENERAL: PAINLEVEL: MILD PAIN (2)

## 2018-04-17 NOTE — PROGRESS NOTES
" 04/17/18 1028   General Information   Type of Visit Initial OP Ortho PT Evaluation   Start of Care Date 04/17/18   Referring Physician Mert   Patient/Family Goals Statement Return to activity at prior level of function   Orders Evaluate and Treat   Orders Comment phase 2a 5/9, 2b 5/23, 3 6/6   Date of Order 04/17/18   Insurance Type Medicare   Medical Diagnosis S/P arthroscopy of right shoulder Z98.890   Surgical/Medical history reviewed Yes   Precautions/Limitations no known precautions/limitations   Special Instructions see orders for details   General Information Comments see op report for additional info   Presentation and Etiology   Pertinent history of current problem (include personal factors and/or comorbidities that impact the POC) Pt states she inured her shoulder \"long ago\" and had some PT that seemed to help. Then this past fall she injured her shoulder again while chasing after some sheets that had blown off the closthes line   Impairments A. Pain;D. Decreased ROM;E. Decreased flexibility;F. Decreased strength and endurance   Functional Limitations perform activities of daily living;perform desired leisure / sports activities   Symptom Location R shoulder   How/Where did it occur At home   Onset date of current episode/exacerbation 04/11/18   Chronicity New   Pain rating (0-10 point scale) Best (/10);Worst (/10)   Best (/10) 2   Worst (/10) 8   Pain quality B. Dull;C. Aching;D. Burning   Frequency of pain/symptoms B. Intermittent   Pain/symptoms are: Other   Pain symptoms comment intermittent throughout the day   Pain/symptoms exacerbated by G. Certain positions   Pain/symptoms eased by E. Changing positions;H. Cold;C. Rest   Progression of symptoms since onset: Unchanged   Prior Level of Function   Prior Level of Function-Mobility Ind   Prior Level of Function-ADLs Ind   Current Level of Function   Current Community Support Family/friend caregiver  (Spouse Bill)   Patient role/employment history " F. Retired  (Homemaker)   Living environment [facility]   Fall Risk Screen   Fall screen completed by PT   Have you fallen 2 or more times in the past year? No   Have you fallen and had an injury in the past year? No   Is patient a fall risk? Department fall risk interventions implemented;No   Functional Scales   Functional Scales Other   Other Scales  PSFS   Planned Therapy Interventions   Planned Therapy Interventions joint mobilization;manual therapy;motor coordination training;neuromuscular re-education;ROM;strengthening;stretching   Planned Modality Interventions   Planned Modality Interventions Cryotherapy;Electrical stimulation;Ultrasound   Clinical Impression   Criteria for Skilled Therapeutic Interventions Met yes, treatment indicated   PT Diagnosis R shoulder pain   Influenced by the following impairments decreased ROM & strength   Functional limitations due to impairments impared ADLs   Clinical Presentation Stable/Uncomplicated   Clinical Decision Making (Complexity) Low complexity   Therapy Frequency 2 times/Week   Predicted Duration of Therapy Intervention (days/wks) 12 weeks   Risk & Benefits of therapy have been explained Yes   Patient, Family & other staff in agreement with plan of care Yes   Education Assessment   Preferred Learning Style Demonstration;Pictures/video   Barriers to Learning No barriers   ORTHO GOALS   PT Ortho Eval Goals 1;2;3   Ortho Goal 1   Goal Identifier Pain   Goal Description Pt to report a max pain level of 1/10 throughout the day for improved ADLs   Target Date 07/10/18   Ortho Goal 2   Goal Identifier ROM   Goal Description Pt to demo R shoulder AROM that is comparable to uninvolved for improved ADLs   Target Date 07/10/18   Ortho Goal 3   Goal Identifier Strength   Goal Description Pt to demo R shoulder strength that is comparable to uninvolved for improved ADLs   Target Date 07/10/18   Total Evaluation Time   Total Evaluation Time 15   Therapy Certification    Certification date from 04/17/18   Certification date to 07/10/18   Medical Diagnosis S/P arthroscopy of right shoulder Z98.890

## 2018-04-17 NOTE — PROGRESS NOTES
[facility]          OUTPATIENT PHYSICAL THERAPY ORTHOPEDIC EVALUATION  PLAN OF TREATMENT FOR OUTPATIENT REHABILITATION  (COMPLETE FOR INITIAL CLAIMS ONLY)  Patient's Last Name, First Name, M.I.  YOB: 1950  Arlyn Holder    Provider s Name:  [facility]   Medical Record No.  [id number]   Start of Care Date:  04/17/18   Onset Date:  04/11/18   Type:     _X__PT   ___OT   ___SLP Medical Diagnosis:  S/P arthroscopy of right shoulder Z98.890     PT Diagnosis:  R shoulder pain   Visits from SOC:  1      _________________________________________________________________________________  Plan of Treatment/Functional Goals:  joint mobilization, manual therapy, motor coordination training, neuromuscular re-education, ROM, strengthening, stretching     Cryotherapy, Electrical stimulation, Ultrasound     Goals  Goal Identifier: Pain  Goal Description: Pt to report a max pain level of 1/10 throughout the day for improved ADLs  Target Date: 07/10/18    Goal Identifier: ROM  Goal Description: Pt to demo R shoulder AROM that is comparable to uninvolved for improved ADLs  Target Date: 07/10/18    Goal Identifier: Strength  Goal Description: Pt to demo R shoulder strength that is comparable to uninvolved for improved ADLs  Target Date: 07/10/18          Therapy Frequency:  2 times/Week  Predicted Duration of Therapy Intervention:  12 weeks    Brendon Martins, PT                 I CERTIFY THE NEED FOR THESE SERVICES FURNISHED UNDER        THIS PLAN OF TREATMENT AND WHILE UNDER MY CARE     (Physician co-signature of this document indicates review and certification of the therapy plan).                       Certification Date From:  04/17/18   Certification Date To:  07/10/18    Referring Provider:  Mert    Initial Assessment        See Epic Evaluation Start of Care Date: 04/17/18

## 2018-04-17 NOTE — MR AVS SNAPSHOT
After Visit Summary   4/17/2018    Arlyn Holder    MRN: [id number]           Patient Information     Date Of Birth          1950        Visit Information        Provider Department      4/17/2018 9:30 AM Wilner Painting DO [facility]        Today's Diagnoses     S/P arthroscopy of right shoulder    -  1       Follow-ups after your visit        Additional Services     PHYSICAL THERAPY REFERRAL       Evaluate and treat.  Modalities as needed.    See operative note for full details of procedure.    Start rehab now:  Phase 1 - no passive ROM at shoulder.  Phase 2A - on 5/9.  Phase 2B - on 5/23 -out of pillow in the daytime, wall walk 1X every hour awake.  Focus is FULL PASSIVE FORWARD FLEXION by 8 weeks post op. Continue to wear pillow at night.  Phase 3 - on 6/6 - out of all gear then.                  Follow-up notes from your care team     Return in about 6 weeks (around 5/29/2018).      Who to contact     If you have questions or need follow up information about today's clinic visit or your schedule please contact [facility] directly at [phone].  Normal or non-critical lab and imaging results will be communicated to you by MyChart, letter or phone within 4 business days after the clinic has received the results. If you do not hear from us within 7 days, please contact the clinic through MyChart or phone. If you have a critical or abnormal lab result, we will notify you by phone as soon as possible.  Submit refill requests through [employer] or call your pharmacy and they will forward the refill request to us. Please allow 3 business days for your refill to be completed.          Additional Information About Your Visit        Care EveryWhere ID     This is your Care EveryWhere ID. This could be used by other organizations to access your [place] medical records  [id number]        Your Vitals Were     Temperature BMI (Body Mass Index)                 98  F (36.7  C) (Tympanic) 27.91 kg/m2           Blood Pressure from Last 3 Encounters:   04/17/18 138/88   04/11/18 136/79   04/05/18 124/70    Weight from Last 3 Encounters:   04/17/18 62.1 kg (137 lb)   04/11/18 62.5 kg (137 lb 12.8 oz)   04/05/18 64.7 kg (142 lb 9.6 oz)              We Performed the Following     PHYSICAL THERAPY REFERRAL        Primary Care Provider Office Phone # Fax #    Brooke Anastasiya Castaneda [phone] [address] [facility] [address]        Equal Access to Services     BLAIRE MEIER : Hadii hawa Ruggiero, ale lau, chan walkermaemmanuel garcia, aruna marcos. So [facility] [phone].    ATENCIÓN: Si habla español, tiene a frank disposición servicios gratuitos de asistencia lingüística. Llame al [phone].    We comply with applicable federal civil rights laws and [place] laws. We do not discriminate on the basis of race, color, national origin, age, disability, sex, sexual orientation, or gender identity.            Thank you!     Thank you for choosing [facility] AND [facility]  for your care. Our goal is always to provide you with excellent care. Hearing back from our patients is one way we can continue to improve our services. Please take a few minutes to complete the written survey that you may receive in the mail after your visit with us. Thank you!             Your Updated Medication List - Protect others around you: Learn how to safely use, store and throw away your medicines at www.disposemymeds.org.          This list is accurate as of 4/17/18  9:57 AM.  Always use your most recent med list.                   Brand Name Dispense Instructions for use Diagnosis    ASPIRIN LOW DOSE 81 MG tablet   Generic drug:  aspirin     30 tablet    Take by mouth daily        atorvastatin 20 MG tablet    LIPITOR    90 tablet    Take 1 tablet (20 mg) by mouth daily    Pure hypercholesterolemia       CALCIUM + D  500-1000-40 MG-UNT-MCG Chew   Generic drug:  Calcium-Vitamin D-Vitamin K           Cod Liver Oil 5000-500 UNIT/5ML Oil           diclofenac 1 % Gel topical gel    VOLTAREN     Apply topically 2 times daily        EQL FLAXSEED OIL 1200 MG Caps      Take 1 capsule by mouth 3 times daily        hydrochlorothiazide 25 MG tablet    HYDRODIURIL    90 tablet    Take 1 tablet (25 mg) by mouth daily    Essential hypertension       HYDROcodone-acetaminophen  MG per tablet    NORCO    60 tablet    Take 1/2 tab every 1 1/2 hours PRN pain.  Take with food, never on an empty stomach.    S/P arthroscopy of shoulder       lecithin 400 MG Caps      Take 1 capsule by mouth 2 times daily        MENS MULTIVITAMIN PLUS Tabs     30 tablet         omega-3 acid ethyl esters 1 g capsule    Lovaza     Take 1,000 mg by mouth        ondansetron 4 MG ODT tab    ZOFRAN-ODT    20 tablet    Take 1 tablet (4 mg) by mouth every 8 hours as needed for nausea Dissolve ON the tongue.    S/P arthroscopy of shoulder       Potassium Chloride ER 20 MEQ Tbcr     90 tablet    Take 1 tablet (20 mEq) by mouth daily with food    Essential hypertension       triamcinolone 0.1 % cream    KENALOG     Apply topically 2 times daily        Vitamin B-12 CR 1000 MCG Tbcr           vitamin E 100 UNIT capsule    TOCOPHEROL

## 2018-04-17 NOTE — PROGRESS NOTES
PROGRESS NOTE    SUBJECTIVE:  Arlyn Holder is here for recheck in evaluation of right shoulder surgery.  She is doing well with expected discomfort.  She is able to move her fingers around without troubles and has been exercising her elbow.    OBJECTIVE:  /88 (BP Location: Right arm, Patient Position: Sitting, Cuff Size: Adult Regular)  Temp 98  F (36.7  C) (Tympanic)  Wt 62.1 kg (137 lb)  BMI 27.91 kg/m2 Body mass index is 27.91 kg/(m^2).    General Appearance: Pleasant 67 year old female in good appearance, mood and affect.  Alert and orientated times three ( time, date and location).    Incision Clean, dry, and intact.    Shoulder:  Motion: Not tested.  Strength: Not tested.    Elbow:  Motion: Full motion.    Hand:  Sensation: Intact.  Radial and ulnar blood flow:  normal.    Eyes: Pupils are round and she wears glasses.    Ears: Hearing: Intact.    Heart: Good capillary refill and her hands pulses are regular.    Lungs: Clear.    Arthroscopy pictures and op note where reviewed with the patient and copies given.    IMPRESSION:    POSTOPERATIVE DIAGNOSES:     1.  Complete tear of the supraspinatus.   2.  Labral degenerative tearing.   3.  Fraying of the long head of the biceps with subluxation.   4.  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     5.  Impingement syndrome.   6.  Acromioclavicular arthritis.       PROCEDURE:   1.  Right shoulder arthroscopy with extensive debridement to include 3% of the anterior to posterior labrum (DOS 04/11/2018).   2.  Arthroscopic extensive debridement 2% of the supraspinatus, done from the subacromial space.   3.  Arthroscopic biceps tenotomy.   4.  Arthroscopic chondroplasty of the humeral head and glenoid.   5.  Arthroscopic subacromial decompression.   6.  Arthroscopic distal clavicle excision with 16 mm width of resection completed.   7.  Arthroscopic rotator cuff repair performed with a double row technique with 1 medial row 5.5 mm  Arthrex BioComposite SwiveLock anchor with 1 fiber cord and 1 other centralized FiberLink and lateral row fixation with a 5.5 mm BioComposite Arthrex SwiveLock anchor.     PLAN:  Suture removal and wound care where completed.  Patient will work with therapy.  Elbow motions where reviewed.  Follow-up in 6 1/2 weeks with PT notes.  Questions and concerns answered.    Evaluate and treat.  Modalities as needed.    See operative note for full details of procedure.    Start rehab now:  Phase 1 - no passive ROM at shoulder.  Phase 2A - on 5/9.  Phase 2B - on 5/23 -out of pillow in the daytime, wall walk 1X every hour awake.  Focus is FULL PASSIVE FORWARD FLEXION by 8 weeks post op. Continue to wear pillow at night.  Phase 3 - on 6/6 - out of all gear then.    Wilner Painting D.O.  Orthopaedic Surgeon    [address] [place] [address]  Phone [phone] (KNEE)  Fax [phone]    Disclaimer:  This note consists of words and symbols derived from keyboarding, dictation, or using voice recognition software. As a result, there may be errors in the script that have gone undetected. Please consider this when interpreting information found in this note.    9:54 AM 4/17/2018

## 2018-04-17 NOTE — NURSING NOTE
Patient is here for her post op of her right shoulder.    DOS: 4/11/18  Vanessa Orr LPN .......4/17/2018 9:32 AM

## 2018-04-26 ENCOUNTER — HOSPITAL ENCOUNTER (OUTPATIENT)
Dept: PHYSICAL THERAPY | Facility: OTHER | Age: 68
Setting detail: THERAPIES SERIES
End: 2018-04-26
Attending: FAMILY MEDICINE
Payer: MEDICARE

## 2018-04-26 PROCEDURE — 97110 THERAPEUTIC EXERCISES: CPT | Mod: GP | Performed by: PHYSICAL THERAPIST

## 2018-04-26 PROCEDURE — 40000185 ZZHC STATISTIC PT OUTPT VISIT: Performed by: PHYSICAL THERAPIST

## 2018-05-09 ENCOUNTER — HOSPITAL ENCOUNTER (OUTPATIENT)
Dept: PHYSICAL THERAPY | Facility: OTHER | Age: 68
Setting detail: THERAPIES SERIES
End: 2018-05-09
Attending: FAMILY MEDICINE
Payer: MEDICARE

## 2018-05-09 PROCEDURE — 40000185 ZZHC STATISTIC PT OUTPT VISIT: Performed by: PHYSICAL THERAPIST

## 2018-05-09 PROCEDURE — 97110 THERAPEUTIC EXERCISES: CPT | Mod: GP | Performed by: PHYSICAL THERAPIST

## 2018-05-09 PROCEDURE — 97016 VASOPNEUMATIC DEVICE THERAPY: CPT | Mod: GP | Performed by: PHYSICAL THERAPIST

## 2018-05-23 ENCOUNTER — HOSPITAL ENCOUNTER (OUTPATIENT)
Dept: PHYSICAL THERAPY | Facility: OTHER | Age: 68
Setting detail: THERAPIES SERIES
End: 2018-05-23
Attending: ORTHOPAEDIC SURGERY
Payer: MEDICARE

## 2018-05-23 PROCEDURE — 97110 THERAPEUTIC EXERCISES: CPT | Mod: GP | Performed by: PHYSICAL THERAPIST

## 2018-05-23 PROCEDURE — 40000185 ZZHC STATISTIC PT OUTPT VISIT: Performed by: PHYSICAL THERAPIST

## 2018-05-25 ENCOUNTER — HOSPITAL ENCOUNTER (OUTPATIENT)
Dept: PHYSICAL THERAPY | Facility: OTHER | Age: 68
Setting detail: THERAPIES SERIES
End: 2018-05-25
Attending: FAMILY MEDICINE
Payer: MEDICARE

## 2018-05-25 PROCEDURE — 97110 THERAPEUTIC EXERCISES: CPT | Mod: GP | Performed by: PHYSICAL THERAPIST

## 2018-05-25 PROCEDURE — 40000185 ZZHC STATISTIC PT OUTPT VISIT: Performed by: PHYSICAL THERAPIST

## 2018-05-30 ENCOUNTER — HOSPITAL ENCOUNTER (OUTPATIENT)
Dept: PHYSICAL THERAPY | Facility: OTHER | Age: 68
Setting detail: THERAPIES SERIES
End: 2018-05-30
Attending: FAMILY MEDICINE
Payer: MEDICARE

## 2018-05-30 PROCEDURE — 40000185 ZZHC STATISTIC PT OUTPT VISIT: Performed by: PHYSICAL THERAPIST

## 2018-05-30 PROCEDURE — 97110 THERAPEUTIC EXERCISES: CPT | Mod: GP | Performed by: PHYSICAL THERAPIST

## 2018-06-01 ENCOUNTER — OFFICE VISIT (OUTPATIENT)
Dept: ORTHOPEDICS | Facility: OTHER | Age: 68
End: 2018-06-01
Attending: ORTHOPAEDIC SURGERY
Payer: MEDICARE

## 2018-06-01 ENCOUNTER — HOSPITAL ENCOUNTER (OUTPATIENT)
Dept: PHYSICAL THERAPY | Facility: OTHER | Age: 68
Setting detail: THERAPIES SERIES
End: 2018-06-01
Attending: FAMILY MEDICINE
Payer: MEDICARE

## 2018-06-01 VITALS
DIASTOLIC BLOOD PRESSURE: 90 MMHG | HEIGHT: 59 IN | WEIGHT: 137 LBS | SYSTOLIC BLOOD PRESSURE: 150 MMHG | HEART RATE: 68 BPM | BODY MASS INDEX: 27.62 KG/M2

## 2018-06-01 DIAGNOSIS — Z98.890 S/P ARTHROSCOPY OF SHOULDER: Primary | ICD-10-CM

## 2018-06-01 PROCEDURE — 99024 POSTOP FOLLOW-UP VISIT: CPT | Performed by: ORTHOPAEDIC SURGERY

## 2018-06-01 PROCEDURE — 97110 THERAPEUTIC EXERCISES: CPT | Mod: GP

## 2018-06-01 PROCEDURE — 40000185 ZZHC STATISTIC PT OUTPT VISIT

## 2018-06-01 PROCEDURE — G0463 HOSPITAL OUTPT CLINIC VISIT: HCPCS

## 2018-06-01 ASSESSMENT — PAIN SCALES - GENERAL: PAINLEVEL: NO PAIN (1)

## 2018-06-01 NOTE — PROGRESS NOTES
"PROGRESS NOTE    SUBJECTIVE:  Arlyn Holder is here for evaluation in regards to right shoulder surgery.  She is 7 weeks and 2 days out from her procedure.  She is making good gains with her physical therapy with expected discomfort..    OBJECTIVE:  BP (!) 152/100 (BP Location: Left arm, Patient Position: Sitting, Cuff Size: Adult Regular)  Pulse 80  Ht 1.492 m (4' 10.74\")  Wt 62.1 kg (137 lb)  BMI 27.92 kg/m2 Body mass index is 27.92 kg/(m^2).    General Appearance: Pleasant 68 year old female in good appearance, mood and affect.  Alert and orientated times three ( time, date and location).    Skin: Intact well-healed surgical incision sites.    Shoulder:  Motion: Her passive forward flexion when tested today was 0-165  with a soft end feel to her capsule.  Strength: Expected weakness.    Elbow:  Motion: Full motion.    Hand:  Sensation: Intact.  Radial and ulnar blood flow:  normal.    Eyes: Pupils are round and she wears glasses.    Ears: Hearing: Intact.    Heart: Good capillary refill and her hands pulses are regular.    Lungs: Clear.    Physical therapy: She is making good gains and is following protocol.    Notes were reviewed with the patient.  Please refer to the reports for full details.      IMPRESSION:    POSTOPERATIVE DIAGNOSES:     1.  Complete tear of the supraspinatus.   2.  Labral degenerative tearing.   3.  Fraying of the long head of the biceps with subluxation.   4.  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     5.  Impingement syndrome.   6.  Acromioclavicular arthritis.       PROCEDURE:   1.  Right shoulder arthroscopy with extensive debridement to include 3% of the anterior to posterior labrum (DOS 04/11/2018).   2.  Arthroscopic extensive debridement 2% of the supraspinatus, done from the subacromial space.   3.  Arthroscopic biceps tenotomy.   4.  Arthroscopic chondroplasty of the humeral head and glenoid.   5.  Arthroscopic subacromial decompression.   6.  " Arthroscopic distal clavicle excision with 16 mm width of resection completed.   7.  Arthroscopic rotator cuff repair performed with a double row technique with 1 medial row 5.5 mm Arthrex BioComposite SwiveLock anchor with 1 fiber cord and 1 other centralized FiberLink and lateral row fixation with a 5.5 mm BioComposite Arthrex SwiveLock anchor.     PLAN:  Patient will continue to work with her formal physical therapy.  I did stress the fact that she needs to be doing lots of stretches at home.  Follow-up in 6 1/2 weeks with PT notes.  Questions and concerns answered.  Phase 3 - on 6/6 - out of all gear then.    Wilner Painting D.O.  Orthopaedic [place] [address]  Phone [phone] (KNEE)  Fax [phone]    Disclaimer:  This note consists of words and symbols derived from keyboarding, dictation, or using voice recognition software. As a result, there may be errors in the script that have gone undetected. Please consider this when interpreting information found in this note.    9:57 AM 6/1/2018

## 2018-06-01 NOTE — MR AVS SNAPSHOT
After Visit Summary   6/1/2018    Arlyn Holder    MRN: [id number]           Patient Information     Date Of Birth          1950        Visit Information        Provider Department      6/1/2018 9:15 AM Wilner Painting DO [facility]        Today's Diagnoses     S/P arthroscopy of right shoulder    -  1       Follow-ups after your visit        Follow-up notes from your care team     Return in about 6 weeks (around 7/13/2018).      Your next 10 appointments already scheduled     Jun 01, 2018 10:30 AM CDT   Treatment with Teressa Guzman PTA   [facility]    [address] [address]   [phone]            Jun 06, 2018  9:00 AM CDT   Treatment with Brendon Martins PT   [facility]    [address] [address]   [phone]            Jun 08, 2018  2:30 PM CDT   Treatment with Brendon Martins PT   [facility]    [address] [address]   [phone]            Jun 11, 2018  9:00 AM CDT   Treatment with Teressa Guzman PTA   [facility]    [address] [address]   [phone]            Ashu 15, 2018  9:00 AM CDT   Treatment with Brendon Martins PT   [facility]    [address] [address]   [phone]            Jun 19, 2018  9:00 AM CDT   Treatment with Teressa Guzman PTA   [facility]    [address] [address]   [phone]            Jun 21, 2018  9:00 AM CDT   Treatment with Brendon Martnis PT   [facility]    [address] [place]  [id number] [phone]            Jun 26, 2018  1:45 PM CDT   Treatment with Brendon Martins, PT   [facility]    [address]   [place] [address] [phone]            Jul 17, 2018  9:30 AM CDT   Return Visit with Wilner Painting,    [facility] ([facility])    [address]   [place] [address] [phone]              Who to contact     If you have questions or need follow up information about today's clinic visit or your schedule please contact [facility] directly at [phone].  Normal or non-critical lab and imaging results will be communicated to you by MyChart, letter or phone within 4 business days after the clinic has received the results. If you do not hear from us within 7 days, please contact the clinic through MyChart or phone. If you have a critical or abnormal lab result, we will notify you by phone as soon as possible.  Submit refill requests through [employer] or call your pharmacy and they will forward the refill request to us. Please allow 3 business days for your refill to be completed.          Additional Information About Your Visit        Care EveryWhere ID     This is your Care EveryWhere ID. This could be used by other organizations to access your [facility] medical records  [id number]        Your Vitals Were     Pulse Height BMI (Body Mass Index)             68 1.492 m (4' 10.74\") 27.92 kg/m2          Blood Pressure from Last 3 Encounters:   06/01/18 150/90   04/17/18 138/88   04/11/18 136/79    Weight from Last 3 Encounters:   06/01/18 62.1 kg (137 lb)   04/17/18 62.1 kg (137 lb)   04/11/18 62.5 kg (137 lb 12.8 oz)              Today, you had the following     No orders found for display       Primary Care Provider Office Phone # Fax #    Brooke Castaneda [phone]       [address]   [place] " [place] [address]        Equal Access to Services     [facility]: Hadii hawa avilez ana paulacarlos Enrriqueali, waaxda luqadaha, qaybta kaalmada jose, aruna marcos. So [facility] [phone].    ATENCIÓN: Si habla español, tiene a frank disposición servicios gratuitos de asistencia lingüística. Llame al [phone].    We comply with applicable federal civil rights laws and [place] laws. We do not discriminate on the basis of race, color, national origin, age, disability, sex, sexual orientation, or gender identity.            Thank you!     Thank you for choosing [facility]  for your care. Our goal is always to provide you with excellent care. Hearing back from our patients is one way we can continue to improve our services. Please take a few minutes to complete the written survey that you may receive in the mail after your visit with us. Thank you!             Your Updated Medication List - Protect others around you: Learn how to safely use, store and throw away your medicines at www.disposemymeds.org.          This list is accurate as of 6/1/18 10:04 AM.  Always use your most recent med list.                   Brand Name Dispense Instructions for use Diagnosis    ASPIRIN LOW DOSE 81 MG tablet   Generic drug:  aspirin     30 tablet    Take by mouth daily        atorvastatin 20 MG tablet    LIPITOR    90 tablet    Take 1 tablet (20 mg) by mouth daily    Pure hypercholesterolemia       Cod Liver Oil 5000-500 UNIT/5ML Oil           diclofenac 1 % Gel topical gel    VOLTAREN     Apply topically 2 times daily        EQL FLAXSEED OIL 1200 MG Caps      Take 1 capsule by mouth 3 times daily        hydrochlorothiazide 25 MG tablet    HYDRODIURIL    90 tablet    Take 1 tablet (25 mg) by mouth daily    Essential hypertension       lecithin 400 MG Caps      Take 1 capsule by mouth 2 times daily        MENS MULTIVITAMIN PLUS Tabs     30 tablet         omega-3 acid ethyl esters 1 g capsule     Lovaza     Take 1,000 mg by mouth        ondansetron 4 MG ODT tab    ZOFRAN-ODT    20 tablet    Take 1 tablet (4 mg) by mouth every 8 hours as needed for nausea Dissolve ON the tongue.    S/P arthroscopy of shoulder       Potassium Chloride ER 20 MEQ Tbcr     90 tablet    Take 1 tablet (20 mEq) by mouth daily with food    Essential hypertension       triamcinolone 0.1 % cream    KENALOG     Apply topically 2 times daily        Vitamin B-12 CR 1000 MCG Tbcr           vitamin E 100 UNIT capsule    TOCOPHEROL

## 2018-06-01 NOTE — NURSING NOTE
Patient is here for a follow up on her right shoulder.  DOS: 4/11/18  Vanessa Orr LPN .......6/1/2018 9:16 AM

## 2018-06-06 ENCOUNTER — HOSPITAL ENCOUNTER (OUTPATIENT)
Dept: PHYSICAL THERAPY | Facility: OTHER | Age: 68
Setting detail: THERAPIES SERIES
End: 2018-06-06
Attending: FAMILY MEDICINE
Payer: MEDICARE

## 2018-06-06 PROCEDURE — 97110 THERAPEUTIC EXERCISES: CPT | Mod: GP | Performed by: PHYSICAL THERAPIST

## 2018-06-06 PROCEDURE — 40000185 ZZHC STATISTIC PT OUTPT VISIT: Performed by: PHYSICAL THERAPIST

## 2018-06-08 ENCOUNTER — HOSPITAL ENCOUNTER (OUTPATIENT)
Dept: PHYSICAL THERAPY | Facility: OTHER | Age: 68
Setting detail: THERAPIES SERIES
End: 2018-06-08
Attending: FAMILY MEDICINE
Payer: MEDICARE

## 2018-06-08 PROCEDURE — G8984 CARRY CURRENT STATUS: HCPCS | Mod: GP,CK | Performed by: PHYSICAL THERAPIST

## 2018-06-08 PROCEDURE — 40000185 ZZHC STATISTIC PT OUTPT VISIT: Performed by: PHYSICAL THERAPIST

## 2018-06-08 PROCEDURE — G8985 CARRY GOAL STATUS: HCPCS | Mod: GP,CI | Performed by: PHYSICAL THERAPIST

## 2018-06-08 PROCEDURE — 97110 THERAPEUTIC EXERCISES: CPT | Mod: GP | Performed by: PHYSICAL THERAPIST

## 2018-06-08 NOTE — PROGRESS NOTES
Outpatient Physical Therapy Progress Note     Patient: Arlyn Holder  : 1950    Beginning/End Dates of Reporting Period:  18 to 2018    Referring Provider: Painting    Therapy Diagnosis: s/p shoulder surgery z98.89     Client Self Report: More sore but she has been using her arm more now that she is out of her sling.    Objective Measurements:  Objective Measure: : 90 P.  : 135 AA.  : 150 AA.  : 155 AA.   157 AA.  : 160 AA.  : 160 AA, 105 A.  Details: R shoulder flexion. P=PROM. AA=AAROM. A=AROM  Objective Measure: 1/10  Details: subjective pain level      Goals:  Goal Identifier Pain   Goal Description Pt to report a max pain level of 1/10 throughout the day for improved ADLs   Target Date 07/10/18   Date Met      Progress:     Goal Identifier ROM   Goal Description Pt to demo R shoulder AROM that is comparable to uninvolved for improved ADLs   Target Date 07/10/18   Date Met      Progress:     Goal Identifier Strength   Goal Description Pt to demo R shoulder strength that is comparable to uninvolved for improved ADLs   Target Date 07/10/18   Date Met      Progress:     Progress Toward Goals:   Progress this reporting period: increased R shoulder ROM & strength    Plan:  Continue therapy per current plan of care.    Discharge:  No

## 2018-06-11 ENCOUNTER — HOSPITAL ENCOUNTER (OUTPATIENT)
Dept: PHYSICAL THERAPY | Facility: OTHER | Age: 68
Setting detail: THERAPIES SERIES
End: 2018-06-11
Attending: FAMILY MEDICINE
Payer: MEDICARE

## 2018-06-11 PROCEDURE — 40000185 ZZHC STATISTIC PT OUTPT VISIT

## 2018-06-11 PROCEDURE — 97110 THERAPEUTIC EXERCISES: CPT | Mod: GP

## 2018-06-15 ENCOUNTER — HOSPITAL ENCOUNTER (OUTPATIENT)
Dept: PHYSICAL THERAPY | Facility: OTHER | Age: 68
Setting detail: THERAPIES SERIES
End: 2018-06-15
Attending: FAMILY MEDICINE
Payer: MEDICARE

## 2018-06-15 PROCEDURE — 97110 THERAPEUTIC EXERCISES: CPT | Mod: GP | Performed by: PHYSICAL THERAPIST

## 2018-06-19 ENCOUNTER — HOSPITAL ENCOUNTER (OUTPATIENT)
Dept: PHYSICAL THERAPY | Facility: OTHER | Age: 68
Setting detail: THERAPIES SERIES
End: 2018-06-19
Attending: FAMILY MEDICINE
Payer: MEDICARE

## 2018-06-19 PROCEDURE — 40000185 ZZHC STATISTIC PT OUTPT VISIT

## 2018-06-19 PROCEDURE — 97110 THERAPEUTIC EXERCISES: CPT | Mod: GP

## 2018-06-21 ENCOUNTER — HOSPITAL ENCOUNTER (OUTPATIENT)
Dept: PHYSICAL THERAPY | Facility: OTHER | Age: 68
Setting detail: THERAPIES SERIES
End: 2018-06-21
Attending: FAMILY MEDICINE
Payer: MEDICARE

## 2018-06-21 PROCEDURE — 97110 THERAPEUTIC EXERCISES: CPT | Mod: GP | Performed by: PHYSICAL THERAPIST

## 2018-06-21 PROCEDURE — 40000185 ZZHC STATISTIC PT OUTPT VISIT: Performed by: PHYSICAL THERAPIST

## 2018-06-27 ENCOUNTER — HOSPITAL ENCOUNTER (OUTPATIENT)
Dept: PHYSICAL THERAPY | Facility: OTHER | Age: 68
Setting detail: THERAPIES SERIES
End: 2018-06-27
Attending: ORTHOPAEDIC SURGERY
Payer: MEDICARE

## 2018-06-27 PROCEDURE — 40000185 ZZHC STATISTIC PT OUTPT VISIT

## 2018-06-27 PROCEDURE — 97110 THERAPEUTIC EXERCISES: CPT | Mod: GP

## 2018-07-06 ENCOUNTER — HOSPITAL ENCOUNTER (OUTPATIENT)
Dept: PHYSICAL THERAPY | Facility: OTHER | Age: 68
Setting detail: THERAPIES SERIES
End: 2018-07-06
Attending: ORTHOPAEDIC SURGERY
Payer: MEDICARE

## 2018-07-06 PROCEDURE — 97110 THERAPEUTIC EXERCISES: CPT | Mod: GP | Performed by: PHYSICAL THERAPIST

## 2018-07-06 PROCEDURE — 40000185 ZZHC STATISTIC PT OUTPT VISIT: Performed by: PHYSICAL THERAPIST

## 2018-07-13 ENCOUNTER — HOSPITAL ENCOUNTER (OUTPATIENT)
Dept: PHYSICAL THERAPY | Facility: OTHER | Age: 68
Setting detail: THERAPIES SERIES
End: 2018-07-13
Attending: ORTHOPAEDIC SURGERY
Payer: MEDICARE

## 2018-07-13 PROCEDURE — 40000185 ZZHC STATISTIC PT OUTPT VISIT: Performed by: PHYSICAL THERAPIST

## 2018-07-13 PROCEDURE — 97110 THERAPEUTIC EXERCISES: CPT | Mod: GP | Performed by: PHYSICAL THERAPIST

## 2018-07-20 ENCOUNTER — TRANSFERRED RECORDS (OUTPATIENT)
Dept: HEALTH INFORMATION MANAGEMENT | Facility: OTHER | Age: 68
End: 2018-07-20

## 2018-07-24 NOTE — PROGRESS NOTES
"Patient Information     Patient Name  Arlyn Holder MRN  [id number] Sex  Female   1950      Letter by Brooke Castaneda MD at      Author:  Brooke Castaneda MD Service:  (none) Author Type:  (none)    Filed:   Encounter Date:  3/23/2017 Status:  (Other)           Arlyn Holder  [address]  [facility] [address]          2017    Dear Ms. Holder:    I wanted to let you know about your recent labs.  Your Basic Metabolic Panel showed that your glucose was 103.  A normal fasting glucose should be under 100.  To meet criteria for diabetes, you would need to have a level of 126 or above.  This means that you are in the \"prediabetes\" category between 100-125.  This means that you do not yet have diabetes, but are at risk for developing it in the future.  Ways you can offset your risks for developing diabetes are to eat healthy (especially minimizing your carbohydrate intake), exercise and maintain a healthy weight.     Your lipids were slightly elevated.  Based on your calculated risks, you have a 12.7% risk of cardiovascular disease in the next 10 years.  For this reason, I would recommend that you consider taking a moderate to high dosed statin such as lipitor or crestor.  If you are interested in this, please call [phone] and a prescription will be made available to you.      If you have questions, please call [phone].      Sincerely,      Brooke Castaneda MD     Resulted Orders      BASIC METABOLIC PANEL (Collected: 3/23/2017 10:06 AM)      Result  Value Ref Range    SODIUM 139 133 - 143 mmol/L    POTASSIUM 3.7 3.5 - 5.1 mmol/L    CHLORIDE 102 98 - 107 mmol/L    CO2,TOTAL 28 21 - 31 mmol/L    ANION GAP 9 5 - 18                    GLUCOSE 103 70 - 105 mg/dL    CALCIUM 9.9 8.6 - 10.3 mg/dL    BUN 27 (H) 7 - 25 mg/dL    CREATININE 0.84 0.70 - 1.30 mg/dL    BUN/CREAT RATIO           32                    GFR if African American >60 >60 ml/min/1.73m2    GFR if not  >60 >60 " ml/min/1.73m2   LIPID PANEL (Collected: 3/23/2017 10:06 AM)      Result  Value Ref Range    CHOLESTEROL,TOTAL 210 (H) <200 mg/dL    TRIGLYCERIDES 106 <150 mg/dL    HDL CHOLESTEROL 55 23 - 92 mg/dL    NON-HDL CHOLESTEROL 155 (H) <145 mg/dl    CHOL/HDL RATIO            3.82 <4.50                    LDL CHOLESTEROL 134 (H) <100 mg/dL    PATIENT STATUS            FASTING

## 2018-08-08 NOTE — ADDENDUM NOTE
Encounter addended by: Brendon Martins, PT on: 8/8/2018  5:13 PM<BR>     Actions taken: Sign clinical note, Episode resolved

## 2018-08-08 NOTE — PROGRESS NOTES
Outpatient Physical Therapy Discharge Note     Patient: Arlyn Holder  : 1950    Beginning/End Dates:  18 to 18    Referring Provider: Dr Painting    Therapy Diagnosis: s/p shoulder surgery, Z98.89     Plan:  Discharge from therapy.    Discharge:   Pt has not returned to physical therapy after a trial of self mgmt techniques indicating that she is doing well with HEP. Please refer to pt's chart for most recent information on objective measurements, goals or any additional information. This is an unplanned DC    Reason for Discharge: Patient has met all goals.  No further expectation of progress.  Patient chooses to discontinue therapy.    Discharge Plan: Patient to continue home program.

## 2018-09-10 ENCOUNTER — HOSPITAL ENCOUNTER (OUTPATIENT)
Dept: MAMMOGRAPHY | Facility: OTHER | Age: 68
Discharge: HOME | End: 2018-09-10
Attending: FAMILY MEDICINE | Admitting: FAMILY MEDICINE
Payer: MEDICARE

## 2018-09-10 DIAGNOSIS — Z12.31 ENCOUNTER FOR SCREENING MAMMOGRAM FOR BREAST CANCER: ICD-10-CM

## 2018-09-10 PROCEDURE — 77063 BREAST TOMOSYNTHESIS BI: CPT

## 2019-03-18 DIAGNOSIS — E78.00 PURE HYPERCHOLESTEROLEMIA: ICD-10-CM

## 2019-03-18 RX ORDER — ATORVASTATIN CALCIUM 20 MG/1
TABLET, FILM COATED ORAL
Qty: 90 TABLET | Refills: 0 | Status: SHIPPED | OUTPATIENT
Start: 2019-03-18 | End: 2019-04-18

## 2019-03-18 NOTE — TELEPHONE ENCOUNTER
Prescription approved per [facility] Refill Protocol.  LOV and labs : 3/29/18  Patient will be due for annual review and labs , letter sent.    Astrid Reyna RN on 3/18/2019 at 10:28 AM

## 2019-03-18 NOTE — LETTER
March 18, 2019      Arlyn Holder  [address]   [place] [address]        A refill request was received from your pharmacy for Atorvastatin.    Additional refills will requires an office visit with Brooke Castaneda for annual review and labs.    Please call [phone] to schedule appointment.        Sincerely,      Refill Nurse

## 2019-04-01 DIAGNOSIS — I10 ESSENTIAL HYPERTENSION: ICD-10-CM

## 2019-04-04 RX ORDER — HYDROCHLOROTHIAZIDE 25 MG/1
TABLET ORAL
Qty: 90 TABLET | Refills: 0 | Status: SHIPPED | OUTPATIENT
Start: 2019-04-04 | End: 2019-04-08

## 2019-04-04 NOTE — TELEPHONE ENCOUNTER
Hydrochlorothiazide  Last Office Visit: 04/05/2018 with Dr. Giles  Last CMP was on 03/29/2018-WNL  Future Office visit:    Next 5 appointments (look out 90 days)    Apr 18, 2019 10:00 AM CDT  PHYSICAL with Brooke Castaneda MD  [facility] [address]  [place] [address]  [phone]         Due for exam.  Limited refill per protocol. Upcoming OV scheduled for 04/18/2019. Judith Amato ........   4/4/2019    8:53 AM

## 2019-04-08 ENCOUNTER — TELEPHONE (OUTPATIENT)
Dept: FAMILY MEDICINE | Facility: OTHER | Age: 69
End: 2019-04-08

## 2019-04-08 DIAGNOSIS — I10 ESSENTIAL HYPERTENSION: ICD-10-CM

## 2019-04-08 RX ORDER — HYDROCHLOROTHIAZIDE 25 MG/1
25 TABLET ORAL DAILY
Qty: 90 TABLET | Refills: 3 | Status: SHIPPED | OUTPATIENT
Start: 2019-04-08 | End: 2020-07-02

## 2019-04-08 NOTE — TELEPHONE ENCOUNTER
Spoke to patient.  She stated that she has an appointment for the 18th, but she needs a refill of her Hydrochlorothiazide.  She last took the medication on Wednesday (04/03/2019)  Natty Tidwell LPN 4/8/2019   9:59 AM

## 2019-04-18 ENCOUNTER — OFFICE VISIT (OUTPATIENT)
Dept: FAMILY MEDICINE | Facility: OTHER | Age: 69
End: 2019-04-18
Attending: FAMILY MEDICINE
Payer: MEDICARE

## 2019-04-18 VITALS
RESPIRATION RATE: 20 BRPM | BODY MASS INDEX: 31.07 KG/M2 | SYSTOLIC BLOOD PRESSURE: 128 MMHG | HEART RATE: 76 BPM | DIASTOLIC BLOOD PRESSURE: 82 MMHG | TEMPERATURE: 98.1 F | HEIGHT: 58 IN | WEIGHT: 148 LBS

## 2019-04-18 DIAGNOSIS — Z78.0 POSTMENOPAUSE: ICD-10-CM

## 2019-04-18 DIAGNOSIS — I10 ESSENTIAL HYPERTENSION: Primary | ICD-10-CM

## 2019-04-18 DIAGNOSIS — Z13.29 SCREENING FOR THYROID DISORDER: ICD-10-CM

## 2019-04-18 DIAGNOSIS — E78.00 PURE HYPERCHOLESTEROLEMIA: ICD-10-CM

## 2019-04-18 DIAGNOSIS — Z00.00 HEALTH CARE MAINTENANCE: ICD-10-CM

## 2019-04-18 DIAGNOSIS — Z23 NEED FOR SHINGLES VACCINE: ICD-10-CM

## 2019-04-18 DIAGNOSIS — Z13.0 SCREENING FOR DEFICIENCY ANEMIA: ICD-10-CM

## 2019-04-18 DIAGNOSIS — M54.9 BACK PAIN, UNSPECIFIED BACK LOCATION, UNSPECIFIED BACK PAIN LATERALITY, UNSPECIFIED CHRONICITY: ICD-10-CM

## 2019-04-18 DIAGNOSIS — I77.810 ASCENDING AORTA DILATATION (H): ICD-10-CM

## 2019-04-18 PROBLEM — M75.41 IMPINGEMENT SYNDROME OF RIGHT SHOULDER: Status: RESOLVED | Noted: 2018-03-01 | Resolved: 2019-04-18

## 2019-04-18 PROBLEM — M19.019 AC (ACROMIOCLAVICULAR) ARTHRITIS: Status: RESOLVED | Noted: 2018-03-01 | Resolved: 2019-04-18

## 2019-04-18 PROBLEM — N95.1 SYMPTOMATIC MENOPAUSAL OR FEMALE CLIMACTERIC STATES: Status: RESOLVED | Noted: 2018-02-15 | Resolved: 2019-04-18

## 2019-04-18 PROBLEM — N81.4 UTERINE PROLAPSE: Status: RESOLVED | Noted: 2018-02-15 | Resolved: 2019-04-18

## 2019-04-18 PROBLEM — M75.121 COMPLETE TEAR OF RIGHT ROTATOR CUFF: Status: RESOLVED | Noted: 2018-03-01 | Resolved: 2019-04-18

## 2019-04-18 PROBLEM — Z98.890 S/P ARTHROSCOPY OF SHOULDER: Status: RESOLVED | Noted: 2018-04-11 | Resolved: 2019-04-18

## 2019-04-18 PROBLEM — M24.80: Status: RESOLVED | Noted: 2018-02-15 | Resolved: 2019-04-18

## 2019-04-18 PROBLEM — M77.50 ENTHESOPATHY OF ANKLE AND TARSUS: Status: RESOLVED | Noted: 2018-02-15 | Resolved: 2019-04-18

## 2019-04-18 PROBLEM — M21.969 ACQUIRED DEFORMITY OF ANKLE AND FOOT: Status: RESOLVED | Noted: 2018-02-15 | Resolved: 2019-04-18

## 2019-04-18 LAB
ALBUMIN SERPL-MCNC: 4.6 G/DL (ref 3.5–5.7)
ALP SERPL-CCNC: 65 U/L (ref 34–104)
ALT SERPL W P-5'-P-CCNC: 20 U/L (ref 7–52)
ANION GAP SERPL CALCULATED.3IONS-SCNC: 8 MMOL/L (ref 3–14)
AST SERPL W P-5'-P-CCNC: 18 U/L (ref 13–39)
BASOPHILS # BLD AUTO: 0.1 10E9/L (ref 0–0.2)
BASOPHILS NFR BLD AUTO: 0.9 %
BILIRUB SERPL-MCNC: 0.7 MG/DL (ref 0.3–1)
BUN SERPL-MCNC: 21 MG/DL (ref 7–25)
CALCIUM SERPL-MCNC: 9.9 MG/DL (ref 8.6–10.3)
CHLORIDE SERPL-SCNC: 104 MMOL/L (ref 98–107)
CHOLEST SERPL-MCNC: 124 MG/DL
CO2 SERPL-SCNC: 28 MMOL/L (ref 21–31)
CREAT SERPL-MCNC: 0.92 MG/DL (ref 0.6–1.2)
DIFFERENTIAL METHOD BLD: NORMAL
EOSINOPHIL # BLD AUTO: 0.3 10E9/L (ref 0–0.7)
EOSINOPHIL NFR BLD AUTO: 3.8 %
ERYTHROCYTE [DISTWIDTH] IN BLOOD BY AUTOMATED COUNT: 14.2 % (ref 10–15)
GFR SERPL CREATININE-BSD FRML MDRD: 60 ML/MIN/{1.73_M2}
GLUCOSE SERPL-MCNC: 113 MG/DL (ref 70–105)
HCT VFR BLD AUTO: 42 % (ref 35–47)
HDLC SERPL-MCNC: 51 MG/DL (ref 23–92)
HGB BLD-MCNC: 13.8 G/DL (ref 11.7–15.7)
IMM GRANULOCYTES # BLD: 0 10E9/L (ref 0–0.4)
IMM GRANULOCYTES NFR BLD: 0.2 %
LDLC SERPL CALC-MCNC: 55 MG/DL
LYMPHOCYTES # BLD AUTO: 1.9 10E9/L (ref 0.8–5.3)
LYMPHOCYTES NFR BLD AUTO: 28.7 %
MCH RBC QN AUTO: 29.7 PG (ref 26.5–33)
MCHC RBC AUTO-ENTMCNC: 32.9 G/DL (ref 31.5–36.5)
MCV RBC AUTO: 91 FL (ref 78–100)
MONOCYTES # BLD AUTO: 0.7 10E9/L (ref 0–1.3)
MONOCYTES NFR BLD AUTO: 10.1 %
NEUTROPHILS # BLD AUTO: 3.7 10E9/L (ref 1.6–8.3)
NEUTROPHILS NFR BLD AUTO: 56.3 %
NONHDLC SERPL-MCNC: 73 MG/DL
PLATELET # BLD AUTO: 271 10E9/L (ref 150–450)
POTASSIUM SERPL-SCNC: 3.5 MMOL/L (ref 3.5–5.1)
PROT SERPL-MCNC: 7.6 G/DL (ref 6.4–8.9)
RBC # BLD AUTO: 4.64 10E12/L (ref 3.8–5.2)
SODIUM SERPL-SCNC: 140 MMOL/L (ref 134–144)
TRIGL SERPL-MCNC: 90 MG/DL
TSH SERPL DL<=0.05 MIU/L-ACNC: 1.87 IU/ML (ref 0.34–5.6)
WBC # BLD AUTO: 6.5 10E9/L (ref 4–11)

## 2019-04-18 PROCEDURE — G0463 HOSPITAL OUTPT CLINIC VISIT: HCPCS

## 2019-04-18 PROCEDURE — 80053 COMPREHEN METABOLIC PANEL: CPT | Performed by: FAMILY MEDICINE

## 2019-04-18 PROCEDURE — 80061 LIPID PANEL: CPT | Performed by: FAMILY MEDICINE

## 2019-04-18 PROCEDURE — 84443 ASSAY THYROID STIM HORMONE: CPT | Performed by: FAMILY MEDICINE

## 2019-04-18 PROCEDURE — 99214 OFFICE O/P EST MOD 30 MIN: CPT | Performed by: FAMILY MEDICINE

## 2019-04-18 PROCEDURE — 36415 COLL VENOUS BLD VENIPUNCTURE: CPT | Performed by: FAMILY MEDICINE

## 2019-04-18 PROCEDURE — 85025 COMPLETE CBC W/AUTO DIFF WBC: CPT | Performed by: FAMILY MEDICINE

## 2019-04-18 RX ORDER — ATORVASTATIN CALCIUM 20 MG/1
20 TABLET, FILM COATED ORAL DAILY
Qty: 90 TABLET | Refills: 3 | Status: SHIPPED | OUTPATIENT
Start: 2019-04-18 | End: 2020-05-01

## 2019-04-18 RX ORDER — CYCLOBENZAPRINE HCL 10 MG
10 TABLET ORAL 3 TIMES DAILY PRN
Qty: 90 TABLET | Refills: 11 | Status: SHIPPED | OUTPATIENT
Start: 2019-04-18 | End: 2020-09-03

## 2019-04-18 ASSESSMENT — ENCOUNTER SYMPTOMS
COUGH: 1
FATIGUE: 0

## 2019-04-18 ASSESSMENT — MIFFLIN-ST. JEOR: SCORE: 1073.13

## 2019-04-18 ASSESSMENT — PAIN SCALES - GENERAL: PAINLEVEL: SEVERE PAIN (6)

## 2019-04-18 NOTE — LETTER
"      Arlyn Holder  [address]   [place] [address]    4/18/2019      Dear Ms. Holder,      I wanted to let you know about your recent labs.  Your glucose was 113.  A normal fasting glucose should be under 100.  To meet criteria for diabetes, you would need to have a level of 126 or above.  This means that you are in the \"prediabetes\" category between 100-125.  This means that you do not yet have diabetes, but are at risk for developing itin the future.  Ways you can offset your risks for developing diabetes are to eat healthy (especially minimizing your carbohydrate intake), exercise and maintain a healthy weight.    All of your other labs were in good range.    Please contact us at [phone] with any questions or concerns that you have.    I have attached your lab results for your records.        Sincerely,         Brooke Castaneda MD    Resulted Orders   CBC and Differential   Result Value Ref Range    WBC 6.5 4.0 - 11.0 10e9/L    RBC Count 4.64 3.8 - 5.2 10e12/L    Hemoglobin 13.8 11.7 - 15.7 g/dL    Hematocrit 42.0 35.0 - 47.0 %    MCV 91 78 - 100 fl    MCH 29.7 26.5 - 33.0 pg    MCHC 32.9 31.5 - 36.5 g/dL    RDW 14.2 10.0 - 15.0 %    Platelet Count 271 150 - 450 10e9/L    Diff Method Automated Method     % Neutrophils 56.3 %    % Lymphocytes 28.7 %    % Monocytes 10.1 %    % Eosinophils 3.8 %    % Basophils 0.9 %    % Immature Granulocytes 0.2 %    Absolute Neutrophil 3.7 1.6 - 8.3 10e9/L    Absolute Lymphocytes 1.9 0.8 - 5.3 10e9/L    Absolute Monocytes 0.7 0.0 - 1.3 10e9/L    Absolute Eosinophils 0.3 0.0 - 0.7 10e9/L    Absolute Basophils 0.1 0.0 - 0.2 10e9/L    Abs Immature Granulocytes 0.0 0 - 0.4 10e9/L   TSH Reflex GH   Result Value Ref Range    TSH Reflex 1.87 0.34 - 5.60 IU/mL   Lipid Panel   Result Value Ref Range    Cholesterol 124 <200 mg/dL    Triglycerides 90 <150 mg/dL    HDL Cholesterol 51 23 - 92 mg/dL    LDL Cholesterol Calculated 55 <100 mg/dL      Comment:      Desirable:       " <100 mg/dl    Non HDL Cholesterol 73 <130 mg/dL   Comprehensive Metabolic Panel   Result Value Ref Range    Sodium 140 134 - 144 mmol/L    Potassium 3.5 3.5 - 5.1 mmol/L    Chloride 104 98 - 107 mmol/L    Carbon Dioxide 28 21 - 31 mmol/L    Anion Gap 8 3 - 14 mmol/L    Glucose 113 (H) 70 - 105 mg/dL    Urea Nitrogen 21 7 - 25 mg/dL    Creatinine 0.92 0.60 - 1.20 mg/dL    GFR Estimate 60 (L) >60 mL/min/[1.73_m2]    GFR Estimate If Black 73 >60 mL/min/[1.73_m2]    Calcium 9.9 8.6 - 10.3 mg/dL    Bilirubin Total 0.7 0.3 - 1.0 mg/dL    Albumin 4.6 3.5 - 5.7 g/dL    Protein Total 7.6 6.4 - 8.9 g/dL    Alkaline Phosphatase 65 34 - 104 U/L    ALT 20 7 - 52 U/L    AST 18 13 - 39 U/L

## 2019-04-18 NOTE — PROGRESS NOTES
"  SUBJECTIVE:   Nursing Notes:   Keily Crystal LPN  4/18/2019 10:18 AM  Sign at exiting of workspace  Chief Complaint   Patient presents with     Follow up of chronic medical issues.       Initial /82 (BP Location: Right arm, Patient Position: Sitting, Cuff Size: Adult Regular)   Pulse 76   Temp 98.1  F (36.7  C) (Tympanic)   Resp 20   Ht 1.461 m (4' 9.5\")   Wt 67.1 kg (148 lb)   Breastfeeding? No   BMI 31.47 kg/m    Estimated body mass index is 31.47 kg/m  as calculated from the following:    Height as of this encounter: 1.461 m (4' 9.5\").    Weight as of this encounter: 67.1 kg (148 lb).  Medication Reconciliation: complete    Keily Crystal LPN    Arlyn Holder is a 68 year old female who presents to clinic today for follow up of chronic medical issues.  She is due for labs.      She has had a history of slightly enlarged ascending aorta noted on previous imagine.  We had previously discussed having a yearly echocardiogram to follow this.    She is fasting today and would like to have labs to recheck her lipids and Comprehensive Metabolic Profile.      She has had previous anterior & posterior repair for a history of cystocele and rectocele.  She has also had a history of mesh bladder suspension in the past.  Sometimes she has a discomfort in her right pelvic region and wonders if she has mesh eroding into her vagina.  She is no longer sexually active since her  had his stroke.      HPI    I personally reviewed medications/allergies/history listed below:    Patient Active Problem List    Diagnosis Date Noted     Enthesopathy of hip region 02/15/2018     Priority: Medium     Degeneration of cervical intervertebral disc 02/15/2018     Priority: Medium     Overview:   Multilevel degenerative disk disease in cervical lumbar spine without   radiculopathy.  Add also possible right cubital tunnel syndrome at elbow.       Chondromalacia of patella 02/15/2018     Priority: Medium     " Hypertension 02/15/2018     Priority: Medium     Ascending aorta dilatation (H) 2016     Priority: Medium     Overview:   3.5-3.6 cm on CT 3/2016 - follow up in 1 year       Arthritis of right wrist 2013     Priority: Medium     GERD (gastroesophageal reflux disease) 2012     Priority: Medium     Tendonitis of wrist, right 2012     Priority: Medium     Hyperlipidemia 10/04/2011     Priority: Medium     Osteoarthritis of ankle or foot 10/04/2011     Priority: Medium     Plantar fasciitis 10/04/2011     Priority: Medium     Carpal tunnel syndrome 2010     Priority: Medium     Disorder of bursae and tendons in shoulder region 2010     Priority: Medium     Lesion of lateral popliteal nerve 10/11/2006     Priority: Medium     Lumbosacral spondylosis without myelopathy 10/04/2006     Priority: Medium     Past Medical History:   Diagnosis Date     AC (acromioclavicular) arthritis 3/1/2018     Bunion of great toe of right foot     No Comments Provided     Carpal tunnel syndrome     2010     Complete tear of right rotator cuff 3/1/2018     Essential (primary) hypertension     No Comments Provided     Gastro-esophageal reflux disease without esophagitis     12/3/2012     Hyperlipidemia     10/4/2011     Impingement syndrome of right shoulder     10/09/07     Personal history of other medical treatment (CODE)      3, Para 3 with three vaginal deliveries     S/P arthroscopy of right shoulder 2018     Thoracic aortic ectasia (H)     3/23/2016,3.5-3.6 cm on CT 3/2016 - follow up in 1 year     Uterovaginal prolapse     preloader comment: selections available to preload differs from paper chart.   *Pelvic floor relaxation      Past Surgical History:   Procedure Laterality Date     ARTHROSCOPY SHOULDER ROTATOR CUFF REPAIR Right 2018    Procedure: ARTHROSCOPY SHOULDER ROTATOR CUFF REPAIR;  Right Shoulder Arthroscopy with Subacromial Decompression, Distal Clavicle Excision,  Rotator Cuff Repair, Biceps Tenotomy, Extensive Debridement;  Surgeon: Wilner Painting DO;  Location: [facility] OR     AS SHOULDER ARTHROSCOPY, DX Right 4/11/18    Dr. Painting     BUNIONECTOMY      1999,right     COLONOSCOPY      2004, normal     COLONOSCOPY  03/16/2015    3/16/15,F/U 2025     HYSTERECTOMY VAGINAL      1982,for uterine prolapse with A&P repair.  Tube and ovaries still in place.     LAPAROSCOPY DIAGNOSTIC (GYN)      1989,for pelvic adhesion     LAPAROSCOPY DIAGNOSTIC (GYN)      11/08,vaginal vault prolapse - Dr. Mumtaz Marshall - anterior/posterior repair with bladder mesh.     RELEASE CARPAL TUNNEL      Dr. Méndez     S/P RIGHT SHOULDER ARTHROSCOPY Right 04/11/2018     Family History   Problem Relation Age of Onset     Diabetes Father         Diabetes     Hypertension Father         Hypertension     Heart Disease Father         Heart Disease     Other - See Comments Father          hearing loss, kidney problems.     Heart Disease Mother         Heart Disease     Hypertension Mother         Hypertension     Cancer Maternal Grandmother         Cancer,bone cancer in her leg     Other - See Comments Maternal Grandmother         Psychiatric illness,depression     Heart Disease Son         Heart Disease,MI - first at age 48     Breast Cancer Sister         Cancer-breast     Diabetes Sister         Diabetes     Heart Disease Sister         Heart Disease,angioplasty CAD     Other - See Comments Sister         Right ear deafness - surgically repaired     Other - See Comments Sister         depression     Diabetes Sister         Diabetes     Hypertension Sister         Hypertension     Other - See Comments Sister         car accident age 75 with multiple fractures - in nursing home.     Heart Disease Brother         Heart Disease     Heart Disease Brother         Heart Disease     Hypertension Brother         Hypertension     Other - See Comments Brother         vertigo, also kidney problems.     Social History      Tobacco Use     Smoking status: Passive Smoke Exposure - Never Smoker     Smokeless tobacco: Never Used     Tobacco comment: Quit smoking:  was a smoker   Substance Use Topics     Alcohol use: No     Alcohol/week: 0.0 oz     Social History     Social History Narrative    The patient is .  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  Justice Holder  Children Ilya born 10/11/65, Gregorio born 7/7/68, Niranjan born 12/31/70.  Sco    tt-grandson born 2/23/87 who is the son of Ilya.     Current Outpatient Medications   Medication Sig Dispense Refill     aspirin (ASPIRIN LOW DOSE) 81 MG tablet Take by mouth daily 30 tablet      atorvastatin (LIPITOR) 20 MG tablet Take 1 tablet (20 mg) by mouth daily 90 tablet 3     Cod Liver Oil 5000-500 UNIT/5ML OIL        Cyanocobalamin (VITAMIN B-12 CR) 1000 MCG TBCR        cyclobenzaprine (FLEXERIL) 10 MG tablet Take 1 tablet (10 mg) by mouth 3 times daily as needed for muscle spasms 90 tablet 11     Flaxseed, Linseed, (EQL FLAXSEED OIL) 1200 MG CAPS Take 1 capsule by mouth 3 times daily       hydrochlorothiazide (HYDRODIURIL) 25 MG tablet Take 1 tablet (25 mg) by mouth daily 90 tablet 3     lecithin 400 MG CAPS Take 1 capsule by mouth 2 times daily       Multiple Vitamins-Minerals (MENS MULTIVITAMIN PLUS) TABS  30 tablet      omega-3 acid ethyl esters (LOVAZA) 1 G capsule Take 1,000 mg by mouth       other medical supplies Shingrix.  Diagnosis:  Z23. 1 each 1     Potassium Chloride ER 20 MEQ TBCR Take 1 tablet (20 mEq) by mouth daily with food 90 tablet 3     triamcinolone (KENALOG) 0.1 % cream Apply topically 2 times daily       vitamin E (TOCOPHEROL) 100 UNIT capsule        Allergies   Allergen Reactions     Codeine Nausea and Vomiting     Diazepam      Other reaction(s): Hallucinations     Naproxen Other (See Comments)     Mouth sores     Sulfa Drugs Unknown      "Chlorhexidine Rash     Sulfacetamide Rash     Had a reaction to sulfa drug but is not sure which one it was-states the reaction resulted in redness       Review of Systems   Constitutional: Negative for fatigue.   Respiratory: Positive for cough (residual since having influenza over the winter).    Psychiatric/Behavioral: Negative for mood changes.        OBJECTIVE:     /82 (BP Location: Right arm, Patient Position: Sitting, Cuff Size: Adult Regular)   Pulse 76   Temp 98.1  F (36.7  C) (Tympanic)   Resp 20   Ht 1.461 m (4' 9.5\")   Wt 67.1 kg (148 lb)   Breastfeeding? No   BMI 31.47 kg/m    Body mass index is 31.47 kg/m .  Physical Exam   Constitutional: She is oriented to person, place, and time. She appears well-developed and well-nourished. No distress.   HENT:   Right Ear: Tympanic membrane and external ear normal.   Left Ear: Tympanic membrane and external ear normal.   Nose: Nose normal.   Mouth/Throat: Oropharynx is clear and moist. No oropharyngeal exudate.   Eyes: Pupils are equal, round, and reactive to light. Conjunctivae are normal. Right eye exhibits no discharge. Left eye exhibits no discharge.   Neck: Neck supple. No tracheal deviation present. No thyromegaly present.   Cardiovascular: Normal rate, regular rhythm, S1 normal, S2 normal, normal heart sounds and normal pulses. Exam reveals no S3, no S4 and no friction rub.   No murmur heard.  Pulmonary/Chest: Effort normal and breath sounds normal. No respiratory distress. She has no wheezes. She has no rales. Right breast exhibits no mass, no nipple discharge and no tenderness. Left breast exhibits no mass, no nipple discharge and no tenderness.   Breast exam:  No masses palpable bilaterally.  No skin changes, tethering or axillary lymphadenopathy bilaterally.     Abdominal: Soft. Bowel sounds are normal. She exhibits no mass. There is no hepatosplenomegaly. There is no tenderness.   Genitourinary: Cervix exhibits no motion tenderness and no " discharge.   Genitourinary Comments: Pelvic exam: bimanual exam showed surgically absent uterus.  Adnexa were normal in size without masses palpable.  No vaginal mesh palpable.    Rectal exam:  Normal sphincter tone.  No masses palpable.     Musculoskeletal: Normal range of motion. She exhibits no edema.   Lymphadenopathy:     She has no cervical adenopathy.   Neurological: She is alert and oriented to person, place, and time. She has normal strength and normal reflexes. She exhibits normal muscle tone.   Skin: Skin is warm and dry. No rash noted.   Psychiatric: She has a normal mood and affect. Judgment and thought content normal. Cognition and memory are normal.         PHQ-2 Score:     PHQ-2 ( 1999 Pfizer) 4/18/2019 4/5/2018   Q1: Little interest or pleasure in doing things 0 0   Q2: Feeling down, depressed or hopeless 0 0   PHQ-2 Score 0 0         I personally reviewed results withpatient as listed below:   Diagnostic Test Results:  none     ASSESSMENT/PLAN:       ICD-10-CM    1. Essential hypertension I10 Comprehensive Metabolic Panel     Comprehensive Metabolic Panel   2. Pure hypercholesterolemia E78.00 atorvastatin (LIPITOR) 20 MG tablet     Lipid Panel     Lipid Panel   3. Ascending aorta dilatation (H) I77.810 Echocardiogram Complete   4. Health care maintenance Z00.00    5. Postmenopause Z78.0 DX Hip/Pelvis/Spine   6. Need for shingles vaccine Z23 other medical supplies   7. Back pain, unspecified back location, unspecified back pain laterality, unspecified chronicity M54.9 cyclobenzaprine (FLEXERIL) 10 MG tablet   8. Screening for deficiency anemia Z13.0 CBC and Differential     CBC and Differential   9. Screening for thyroid disorder Z13.29 TSH Reflex [facility]     TSH Reflex [facility]       1.  Blood pressure stable.  Comprehensive Metabolic Profile as above.  Refills of hydrochlorothiazide are up to date.  2.  Fasting lipid profile today.  Atorvastatin refilled.  3.  Follow up echocardiogram ordered.  4.  Mammogram  last completed 9/10/18.  Colonoscopy last completed in 2015 - 10 year follow up recommended.  Pap Smear deferred due to age > 65 and prior hysterectomy.  Vaccines reviewed.  5.  DEXA ordered.  6.  Prescription given to obtain Shingrix at outside pharmacy.  7.  Stable.  Flexeril refilled.  8.  Complete Blood Count as above.  9.  TSH as above.     Brooke Castaneda MD  [facility] AND [facility]

## 2019-04-18 NOTE — NURSING NOTE
"Chief Complaint   Patient presents with     Physical       Initial /82 (BP Location: Right arm, Patient Position: Sitting, Cuff Size: Adult Regular)   Pulse 76   Temp 98.1  F (36.7  C) (Tympanic)   Resp 20   Ht 1.461 m (4' 9.5\")   Wt 67.1 kg (148 lb)   Breastfeeding? No   BMI 31.47 kg/m   Estimated body mass index is 31.47 kg/m  as calculated from the following:    Height as of this encounter: 1.461 m (4' 9.5\").    Weight as of this encounter: 67.1 kg (148 lb).  Medication Reconciliation: complete    Keily Crystal LPN  "

## 2019-05-01 ENCOUNTER — HOSPITAL ENCOUNTER (OUTPATIENT)
Dept: CARDIOLOGY | Facility: OTHER | Age: 69
End: 2019-05-01
Attending: FAMILY MEDICINE
Payer: MEDICARE

## 2019-05-01 ENCOUNTER — HOSPITAL ENCOUNTER (OUTPATIENT)
Dept: BONE DENSITY | Facility: OTHER | Age: 69
Discharge: HOME | End: 2019-05-01
Attending: FAMILY MEDICINE | Admitting: FAMILY MEDICINE
Payer: MEDICARE

## 2019-05-01 DIAGNOSIS — Z78.0 POSTMENOPAUSE: ICD-10-CM

## 2019-05-01 DIAGNOSIS — I77.810 ASCENDING AORTA DILATATION (H): ICD-10-CM

## 2019-05-01 PROCEDURE — 93306 TTE W/DOPPLER COMPLETE: CPT | Mod: 26 | Performed by: INTERNAL MEDICINE

## 2019-05-01 PROCEDURE — 93306 TTE W/DOPPLER COMPLETE: CPT

## 2019-05-01 PROCEDURE — 77080 DXA BONE DENSITY AXIAL: CPT

## 2019-06-05 DIAGNOSIS — E87.6 HYPOKALEMIA: Primary | ICD-10-CM

## 2019-06-07 ENCOUNTER — TELEPHONE (OUTPATIENT)
Dept: FAMILY MEDICINE | Facility: OTHER | Age: 69
End: 2019-06-07

## 2019-06-07 RX ORDER — POTASSIUM CHLORIDE 1500 MG/1
TABLET, EXTENDED RELEASE ORAL
Qty: 90 TABLET | Refills: 3 | Status: SHIPPED | OUTPATIENT
Start: 2019-06-07 | End: 2020-06-09

## 2019-06-07 NOTE — TELEPHONE ENCOUNTER
Informed patient that her Potassium chloride ER (K-DUR/KLOR-CON M) 20 meq CR tablet has been refilled by her PCP for a year supply. Informed her that it was authorized for refill today and sent to [id number]. Patient stated understanding and that she will  the RX on Monday. Patient denied further questions and concerns at conclusion of phone conversation.       Natty Boudreaux on 6/7/2019 at 3:19 PM

## 2019-06-07 NOTE — TELEPHONE ENCOUNTER
Potassium Chloride ER 20 MEQ TBCR  LOV and labs- 04/18/2019    Future Office visit:       Routing refill request to provider for review/approval.     Unable to complete prescription refill per RNMedication Refill Policy.................... Judith Amato ....................  6/7/2019   11:48 AM

## 2019-10-04 ENCOUNTER — HOSPITAL ENCOUNTER (OUTPATIENT)
Dept: MAMMOGRAPHY | Facility: OTHER | Age: 69
Discharge: HOME | End: 2019-10-04
Attending: FAMILY MEDICINE | Admitting: FAMILY MEDICINE
Payer: MEDICARE

## 2019-10-04 DIAGNOSIS — Z12.31 VISIT FOR SCREENING MAMMOGRAM: ICD-10-CM

## 2019-10-04 PROCEDURE — 77063 BREAST TOMOSYNTHESIS BI: CPT

## 2019-11-14 ENCOUNTER — OFFICE VISIT (OUTPATIENT)
Dept: PEDIATRICS | Facility: OTHER | Age: 69
End: 2019-11-14
Attending: INTERNAL MEDICINE
Payer: COMMERCIAL

## 2019-11-14 VITALS
RESPIRATION RATE: 19 BRPM | TEMPERATURE: 97.9 F | BODY MASS INDEX: 31.57 KG/M2 | HEART RATE: 90 BPM | WEIGHT: 150.4 LBS | DIASTOLIC BLOOD PRESSURE: 80 MMHG | HEIGHT: 58 IN | SYSTOLIC BLOOD PRESSURE: 126 MMHG | OXYGEN SATURATION: 96 %

## 2019-11-14 DIAGNOSIS — R13.19 ESOPHAGEAL DYSPHAGIA: ICD-10-CM

## 2019-11-14 DIAGNOSIS — K21.00 GASTROESOPHAGEAL REFLUX DISEASE WITH ESOPHAGITIS: Primary | ICD-10-CM

## 2019-11-14 DIAGNOSIS — E66.09 NON MORBID OBESITY DUE TO EXCESS CALORIES: ICD-10-CM

## 2019-11-14 PROCEDURE — G0008 ADMIN INFLUENZA VIRUS VAC: HCPCS

## 2019-11-14 PROCEDURE — 90662 IIV NO PRSV INCREASED AG IM: CPT

## 2019-11-14 PROCEDURE — G0463 HOSPITAL OUTPT CLINIC VISIT: HCPCS | Mod: 25

## 2019-11-14 PROCEDURE — 99214 OFFICE O/P EST MOD 30 MIN: CPT | Performed by: INTERNAL MEDICINE

## 2019-11-14 PROCEDURE — G0463 HOSPITAL OUTPT CLINIC VISIT: HCPCS

## 2019-11-14 RX ORDER — SUCRALFATE ORAL 1 G/10ML
1 SUSPENSION ORAL 4 TIMES DAILY
Qty: 420 ML | Refills: 3 | Status: SHIPPED | OUTPATIENT
Start: 2019-11-14 | End: 2020-09-03

## 2019-11-14 ASSESSMENT — MIFFLIN-ST. JEOR: SCORE: 1082.99

## 2019-11-14 ASSESSMENT — PAIN SCALES - GENERAL: PAINLEVEL: NO PAIN (0)

## 2019-11-14 NOTE — NURSING NOTE
Patient presents to the clinic for throat problem, has been burping since this summer and coughing started Sunday, having some dysphagia also. Has the feeling that she needs to clear her throat.        Medication Reconciliation: complete    Roberto Connolly LPN

## 2019-11-14 NOTE — NURSING NOTE
Immunization Documentation    Prior to Immunization administration, verified patients identity using patient's name and date of birth. Please see IMMUNIZATIONS  and order for additional information.  Patient / Parent instructed to remain in clinic for 15 minutes and report any adverse reaction to staff immediately.    Was the entire amount of vaccines given used? Yes    Roberto Connolly LPN  11/14/2019   12:01 PM

## 2019-11-14 NOTE — PROGRESS NOTES
Subjective  Arlyn Holder is a 69 year old female who presents for throat concerns.  For the last couple of months she is been burping a lot.  She thinks she might have acid reflux but it is not painful but it is uncomfortable.  She has some sour taste occasionally.  She feels some nausea.  On Saturday she was eating tater tot hot dish which she was really looking forward to and took 2 bites and it got stuck in her chest area.  She is found that she cannot swallow French fries.  She can do well with fissure salmon.  She is been able to eat small pieces of venison without any problem.  She tried aluminating some vitamins and foods without any major change in her symptom.    Problem List/PMH: reviewed in EMR, and made relevant updates today.  Medications: reviewed in EMR, and made relevant updates today.  Allergies: reviewed in EMR, and made relevant updates today.    Social Hx:  Social History     Tobacco Use     Smoking status: Passive Smoke Exposure - Never Smoker     Smokeless tobacco: Never Used     Tobacco comment: Quit smoking:  was a smoker   Substance Use Topics     Alcohol use: No     Alcohol/week: 0.0 standard drinks     Drug use: No     Social History     Social History Narrative    The patient is .  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  Justice Holder  Children Ilya born 10/11/65, Gregorio born 7/7/68, Niranjan born 12/31/70.  Sco    tt-grandson born 2/23/87 who is the son of Ilya.     I reviewed social history and made relevant updates today.    Family Hx:   Family History   Problem Relation Age of Onset     Diabetes Father         Diabetes     Hypertension Father         Hypertension     Heart Disease Father         Heart Disease     Other - See Comments Father          hearing loss, kidney problems.     Heart Disease Mother         Heart Disease     Hypertension Mother         Hypertension  "    Cancer Maternal Grandmother         Cancer,bone cancer in her leg     Other - See Comments Maternal Grandmother         Psychiatric illness,depression     Heart Disease Son         Heart Disease,MI - first at age 48     Breast Cancer Sister         Cancer-breast     Diabetes Sister         Diabetes     Heart Disease Sister         Heart Disease,angioplasty CAD     Other - See Comments Sister         Right ear deafness - surgically repaired     Other - See Comments Sister         depression     Diabetes Sister         Diabetes     Hypertension Sister         Hypertension     Other - See Comments Sister         car accident age 75 with multiple fractures - in nursing home.     Heart Disease Brother         Heart Disease     Heart Disease Brother         Heart Disease     Hypertension Brother         Hypertension     Other - See Comments Brother         vertigo, also kidney problems.       Objective  Vitals: reviewed in EMR.  /80   Pulse 90   Temp 97.9  F (36.6  C) (Tympanic)   Resp 19   Ht 1.467 m (4' 9.75\")   Wt 68.2 kg (150 lb 6.4 oz)   SpO2 96%   BMI 31.71 kg/m      Gen: Pleasant female, NAD.  HEENT: MMM, no OP erythema.   Neck: Supple, no JVD, no bruits.  CV: RRR no m/r/g.   Pulm: CTAB no w/r/r  GI: Soft, NT, ND. No HSM. No masses. Bowel sounds present.  Neuro: Grossly intact  Msk: No lower extremity edema.  Skin: No concerning lesions.  Psychiatric: Normal affect and insight. Does not appear anxious or depressed.        Assessment    ICD-10-CM    1. Gastroesophageal reflux disease with esophagitis K21.0 omeprazole (PRILOSEC) 20 MG DR capsule     ranitidine (ZANTAC) 150 MG tablet     sucralfate (CARAFATE) 1 GM/10ML suspension   2. Esophageal dysphagia R13.10 omeprazole (PRILOSEC) 20 MG DR capsule     ranitidine (ZANTAC) 150 MG tablet     sucralfate (CARAFATE) 1 GM/10ML suspension   3. Non morbid obesity due to excess calories E66.09 omeprazole (PRILOSEC) 20 MG DR capsule     ranitidine (ZANTAC) " 150 MG tablet     sucralfate (CARAFATE) 1 GM/10ML suspension     Orders Placed This Encounter   Procedures     INFLUENZA (HIGH DOSE) 3 VALENT VACCINE [19429]       It sounds as though she has gastritis with gastroesophageal reflux disease hopefully symptoms will improve with conservative treatments.  Differential diagnosis also includes Bauer's esophagus, esophageal stricture, esophageal cancer, eosinophilic esophagitis, extrinsic compression from lymphoma or others, others.    Plan   -- Expected clinical course discussed   -- Medications and their side effects discussed  Patient Instructions      -- Start omeprazole (Prilosec) 20 mg daily   -- Use ranitidine (Zantac) 150 mg twice daily as needed for heart burn, upset stomach.   -- Use sucralfate (Carafate) 2-3 times per day to allow stomach lining to heal   -- Antacids are okay to use as well as needed (eg Tums)   -- Avoid trigger foods (eg spicy foods, caffeine, alcohol -- see longer list below)   -- Avoid NSAIDs (eg ibuprofen/Advil, naproxen/Aleve)   -- Work on healthy weight   -- Reduce stress in your life   -- After your symptoms have improved (around 30 days) consider stopping omeprazole   -- Okay to continue ranitidine as needed   -- If symptoms persist or worsen, return as we would consider obtaining endoscopy      Your BMI is Body mass index is 31.71 kg/m .  (BMI ranges: Normal 18.5 - 25, Overweight 25 - 30, Obesity greater than 30,     Morbid Obesity greater than 40 or greater than 35 with associated conditions.)    Facts about losing weight:   -- Overweight and Obesity increase your risk for developing diabetes, high blood pressure and stroke, and shorten your life.   -- 90% of weight loss comes from dietary changes, only 10% from exercise    What should I do?   -- Work on 5-10% weight loss   -- Focus on a few healthy dietary changes   -- Eat more fresh fruits and vegetables, and fewer carbohydrates   -- Cut out all calorie-containing beverages (milk,  juice, alcohol, etc)   -- Exercise every day   -- Weigh yourself once a week   -- Consider the DASH Diet (http://http://bit.ly/DASHDiet - redirects to the [facility])   -- Consider Weight Watchers (http://www.weightwatchers.[employer])   -- Consider My Fitness Pal (iOS, Android, http://www.[employer])               Lifestyle Changes for Controlling GERD  When you have GERD, stomach acid feels as if it s backing up toward your mouth. Whether or not you take medicine to control your GERD, your symptoms can often be improved with lifestyle changes. Talk to your healthcare provider about the following suggestions. These suggestions may help you get relief from your symptoms.      Raise your head  Reflux is more likely to strike when you re lying down flat, because stomach fluid can flow backward more easily. Raising the head of your bed 4 to 6 inches can help. To do this:    Slide blocks or books under the legs at the head of your bed. Or, place a wedge under the mattress. Many [employer] can make a suitable wedge for you. The wedge should run from your waist to the top of your head.    Don t just prop your head on several pillows. This increases pressure on your stomach. It can make GERD worse.  Watch your eating habits  Certain foods may increase the acid in your stomach or relax the lower esophageal sphincter. This makes GERD more likely. It s best to avoid the following if they cause you symptoms:    Coffee, tea, and carbonated drinks (with and without caffeine)    Fatty, fried, or spicy food    Mint, chocolate, onions, and tomatoes    Peppermint    Any other foods that seem to irritate your stomach or cause you pain  Relieve the pressure  Tips include the following:    Eat smaller meals, even if you have to eat more often.    Don t lie down right after you eat. Wait a few hours for your stomach to empty.    Avoid tight belts and tight-fitting clothes.    Lose excess weight.  Tobacco and alcohol  Avoid smoking tobacco and  drinking alcohol. They can make GERD symptoms worse.  Date Last Reviewed: 7/1/2016 2000-2017 [employer]. [address] [place] [address], [place] [address]. All rights reserved. This information is not intended as a substitute for professional medical care. Always follow your healthcare professional's instructions.          Return if symptoms worsen or fail to improve.    Signed, Jhonatan Burdick MD, FAAP, FACP  Internal Medicine & Pediatrics

## 2019-11-14 NOTE — PATIENT INSTRUCTIONS
-- Start omeprazole (Prilosec) 20 mg daily   -- Use ranitidine (Zantac) 150 mg twice daily as needed for heart burn, upset stomach.   -- Use sucralfate (Carafate) 2-3 times per day to allow stomach lining to heal   -- Antacids are okay to use as well as needed (eg Tums)   -- Avoid trigger foods (eg spicy foods, caffeine, alcohol -- see longer list below)   -- Avoid NSAIDs (eg ibuprofen/Advil, naproxen/Aleve)   -- Work on healthy weight   -- Reduce stress in your life   -- After your symptoms have improved (around 30 days) consider stopping omeprazole   -- Okay to continue ranitidine as needed   -- If symptoms persist or worsen, return as we would consider obtaining endoscopy      Your BMI is Body mass index is 31.71 kg/m .  (BMI ranges: Normal 18.5 - 25, Overweight 25 - 30, Obesity greater than 30,     Morbid Obesity greater than 40 or greater than 35 with associated conditions.)    Facts about losing weight:   -- Overweight and Obesity increase your risk for developing diabetes, high blood pressure and stroke, and shorten your life.   -- 90% of weight loss comes from dietary changes, only 10% from exercise    What should I do?   -- Work on 5-10% weight loss   -- Focus on a few healthy dietary changes   -- Eat more fresh fruits and vegetables, and fewer carbohydrates   -- Cut out all calorie-containing beverages (milk, juice, alcohol, etc)   -- Exercise every day   -- Weigh yourself once a week   -- Consider the DASH Diet (http://http://bit.ly/DASHDiet - redirects to the [employer])   -- Consider Weight Watchers (http://www.weightwatchers.com)   -- Consider My Fitness Pal (iOS, Android, http://www.[employer].com)               Lifestyle Changes for Controlling GERD  When you have GERD, stomach acid feels as if it s backing up toward your mouth. Whether or not you take medicine to control your GERD, your symptoms can often be improved with lifestyle changes. Talk to your healthcare provider about the following  suggestions. These suggestions may help you get relief from your symptoms.      Raise your head  Reflux is more likely to strike when you re lying down flat, because stomach fluid can flow backward more easily. Raising the head of your bed 4 to 6 inches can help. To do this:    Slide blocks or books under the legs at the head of your bed. Or, place a wedge under the mattress. Many foam stores can make a suitable wedge for you. The wedge should run from your waist to the top of your head.    Don t just prop your head on several pillows. This increases pressure on your stomach. It can make GERD worse.  Watch your eating habits  Certain foods may increase the acid in your stomach or relax the lower esophageal sphincter. This makes GERD more likely. It s best to avoid the following if they cause you symptoms:    Coffee, tea, and carbonated drinks (with and without caffeine)    Fatty, fried, or spicy food    Mint, chocolate, onions, and tomatoes    Peppermint    Any other foods that seem to irritate your stomach or cause you pain  Relieve the pressure  Tips include the following:    Eat smaller meals, even if you have to eat more often.    Don t lie down right after you eat. Wait a few hours for your stomach to empty.    Avoid tight belts and tight-fitting clothes.    Lose excess weight.  Tobacco and alcohol  Avoid smoking tobacco and drinking alcohol. They can make GERD symptoms worse.  Date Last Reviewed: 7/1/2016 2000-2017 [employer]. [address], [place] [address]. All rights reserved. This information is not intended as a substitute for professional medical care. Always follow your healthcare professional's instructions.

## 2019-11-15 ENCOUNTER — TELEPHONE (OUTPATIENT)
Dept: PEDIATRICS | Facility: OTHER | Age: 69
End: 2019-11-15

## 2019-11-15 DIAGNOSIS — K21.00 GASTROESOPHAGEAL REFLUX DISEASE WITH ESOPHAGITIS: Primary | ICD-10-CM

## 2019-11-15 RX ORDER — SUCRALFATE 1 G/1
1 TABLET ORAL 4 TIMES DAILY
Qty: 360 TABLET | Refills: 11 | Status: SHIPPED | OUTPATIENT
Start: 2019-11-15 | End: 2020-09-03

## 2019-11-18 ENCOUNTER — TELEPHONE (OUTPATIENT)
Dept: INTERNAL MEDICINE | Facility: OTHER | Age: 69
End: 2019-11-18

## 2019-11-18 NOTE — TELEPHONE ENCOUNTER
I received a denial for the medication Carfate suspension for non formulary. I faxed denial to [phone].  Edith Elam on 11/18/2019 at 9:14 AM

## 2020-04-30 DIAGNOSIS — E78.00 PURE HYPERCHOLESTEROLEMIA: ICD-10-CM

## 2020-05-01 RX ORDER — ATORVASTATIN CALCIUM 20 MG/1
20 TABLET, FILM COATED ORAL DAILY
Qty: 90 TABLET | Refills: 3 | Status: SHIPPED | OUTPATIENT
Start: 2020-05-01 | End: 2020-09-03

## 2020-05-01 NOTE — TELEPHONE ENCOUNTER
Atorvastatin (LIPITOR) 20 MG tablet  Last Written Prescription Date: 04/18/2019  Last Fill Quantity: 90,   # refills: 3  Last Office Visit: 11/14/2019  Future Office visit:       Routing refill request to provider for review/approval because:    Failed - No LDL on file in past 12 months   Recent Labs   Lab Test 04/18/19  1100   LDL 55          Unable to complete prescription refill per RNMedication Refill Policy.................... Judith Amato RN ....................  5/1/2020   10:05 AM

## 2020-06-08 DIAGNOSIS — E87.6 HYPOKALEMIA: ICD-10-CM

## 2020-06-09 RX ORDER — POTASSIUM CHLORIDE 1500 MG/1
TABLET, EXTENDED RELEASE ORAL
Qty: 90 TABLET | Refills: 1 | Status: SHIPPED | OUTPATIENT
Start: 2020-06-09 | End: 2020-09-03

## 2020-06-09 NOTE — TELEPHONE ENCOUNTER
Potassium chloride ER (K-DUR/KLOR-CON M) 20 MEQ CR tablet   Last Written Prescription Date: 06/07/2019  Last Fill Quantity: 90,   # refills: 3  Last Office Visit: 11/14/2019  Future Office visit:       Routing refill request to provider for review/approval because:  Due for annual labs  Recent Labs   Lab Test 04/18/19  1100   POTASSIUM 3.5      Unable to complete prescription refill per RNMedication Refill Policy.................... Judith Amato RN ....................  6/9/2020   12:29 PM

## 2020-07-02 DIAGNOSIS — I10 ESSENTIAL HYPERTENSION: ICD-10-CM

## 2020-07-02 RX ORDER — HYDROCHLOROTHIAZIDE 25 MG/1
25 TABLET ORAL DAILY
Qty: 90 TABLET | Refills: 3 | Status: SHIPPED | OUTPATIENT
Start: 2020-07-02 | End: 2020-09-03

## 2020-07-02 NOTE — TELEPHONE ENCOUNTER
Routing refill request to provider for review/approval because:   Recent (12 mo) or future (30 days) visit within the authorizing provider's specialty Protocol Details    Normal serum creatinine on file in past 12 months     Normal serum potassium on file in past 12 months     Normal serum sodium on file in past 12 months      LOV: 11/14/2019  Patient is due for annual review   Letter sent  Astrid Reyna RN on 7/2/2020 at 4:09 PM

## 2020-07-02 NOTE — LETTER
July 2, 2020      Arlyn Holder  [address]   [place] [address]        Dear Arlyn,     A refill request was received from your pharmacy for hydrochlorothiazide    Additional refills require an office visit with Dr. Roger Roman for annual review.    Please call [phone] to schedule appointment.        Sincerely,    Refill Nurse

## 2020-09-03 ENCOUNTER — OFFICE VISIT (OUTPATIENT)
Dept: FAMILY MEDICINE | Facility: OTHER | Age: 70
End: 2020-09-03
Attending: FAMILY MEDICINE
Payer: COMMERCIAL

## 2020-09-03 VITALS
RESPIRATION RATE: 18 BRPM | SYSTOLIC BLOOD PRESSURE: 132 MMHG | HEART RATE: 65 BPM | TEMPERATURE: 97.5 F | DIASTOLIC BLOOD PRESSURE: 78 MMHG | OXYGEN SATURATION: 98 % | HEIGHT: 58 IN | WEIGHT: 148 LBS | BODY MASS INDEX: 31.07 KG/M2

## 2020-09-03 DIAGNOSIS — N39.0 URINARY TRACT INFECTION WITHOUT HEMATURIA, SITE UNSPECIFIED: ICD-10-CM

## 2020-09-03 DIAGNOSIS — E78.00 PURE HYPERCHOLESTEROLEMIA: ICD-10-CM

## 2020-09-03 DIAGNOSIS — Z00.00 ENCOUNTER FOR MEDICARE ANNUAL WELLNESS EXAM: Primary | ICD-10-CM

## 2020-09-03 DIAGNOSIS — L30.9 DERMATITIS: ICD-10-CM

## 2020-09-03 DIAGNOSIS — I77.810 ASCENDING AORTA DILATATION (H): ICD-10-CM

## 2020-09-03 DIAGNOSIS — T83.711A EROSION OF IMPLANTED VAGINAL MESH TO SURROUNDING TISSUE, INITIAL ENCOUNTER (H): ICD-10-CM

## 2020-09-03 DIAGNOSIS — N81.6 CYSTOCELE WITH RECTOCELE: ICD-10-CM

## 2020-09-03 DIAGNOSIS — M54.9 BACK PAIN, UNSPECIFIED BACK LOCATION, UNSPECIFIED BACK PAIN LATERALITY, UNSPECIFIED CHRONICITY: ICD-10-CM

## 2020-09-03 DIAGNOSIS — I10 ESSENTIAL HYPERTENSION: ICD-10-CM

## 2020-09-03 DIAGNOSIS — N81.10 CYSTOCELE WITH RECTOCELE: ICD-10-CM

## 2020-09-03 LAB
ALBUMIN SERPL-MCNC: 4.6 G/DL (ref 3.5–5.7)
ALBUMIN UR-MCNC: NEGATIVE MG/DL
ALP SERPL-CCNC: 75 U/L (ref 34–104)
ALT SERPL W P-5'-P-CCNC: 21 U/L (ref 7–52)
AMORPH CRY #/AREA URNS HPF: ABNORMAL /HPF
ANION GAP SERPL CALCULATED.3IONS-SCNC: 7 MMOL/L (ref 3–14)
APPEARANCE UR: CLEAR
AST SERPL W P-5'-P-CCNC: 20 U/L (ref 13–39)
BILIRUB SERPL-MCNC: 0.8 MG/DL (ref 0.3–1)
BILIRUB UR QL STRIP: NEGATIVE
BUN SERPL-MCNC: 22 MG/DL (ref 7–25)
CALCIUM SERPL-MCNC: 9.8 MG/DL (ref 8.6–10.3)
CHLORIDE SERPL-SCNC: 104 MMOL/L (ref 98–107)
CHOLEST SERPL-MCNC: 130 MG/DL
CO2 SERPL-SCNC: 30 MMOL/L (ref 21–31)
COLOR UR AUTO: ABNORMAL
CREAT SERPL-MCNC: 0.9 MG/DL (ref 0.6–1.2)
GFR SERPL CREATININE-BSD FRML MDRD: 62 ML/MIN/{1.73_M2}
GLUCOSE SERPL-MCNC: 111 MG/DL (ref 70–105)
GLUCOSE UR STRIP-MCNC: NEGATIVE MG/DL
HDLC SERPL-MCNC: 47 MG/DL (ref 23–92)
HGB UR QL STRIP: NEGATIVE
KETONES UR STRIP-MCNC: NEGATIVE MG/DL
LDLC SERPL CALC-MCNC: 61 MG/DL
LEUKOCYTE ESTERASE UR QL STRIP: NEGATIVE
MUCOUS THREADS #/AREA URNS LPF: PRESENT /LPF
NITRATE UR QL: POSITIVE
NONHDLC SERPL-MCNC: 83 MG/DL
PH UR STRIP: 6.5 PH (ref 5–7)
POTASSIUM SERPL-SCNC: 3.6 MMOL/L (ref 3.5–5.1)
PROT SERPL-MCNC: 7.4 G/DL (ref 6.4–8.9)
RBC #/AREA URNS AUTO: 0 /HPF (ref 0–2)
SODIUM SERPL-SCNC: 141 MMOL/L (ref 134–144)
SOURCE: ABNORMAL
SP GR UR STRIP: 1.01 (ref 1–1.03)
SQUAMOUS #/AREA URNS AUTO: 1 /HPF (ref 0–1)
TRIGL SERPL-MCNC: 111 MG/DL
UROBILINOGEN UR STRIP-MCNC: NORMAL MG/DL (ref 0–2)
WBC #/AREA URNS AUTO: 3 /HPF (ref 0–5)

## 2020-09-03 PROCEDURE — 80061 LIPID PANEL: CPT | Mod: ZL | Performed by: FAMILY MEDICINE

## 2020-09-03 PROCEDURE — 36415 COLL VENOUS BLD VENIPUNCTURE: CPT | Mod: ZL | Performed by: FAMILY MEDICINE

## 2020-09-03 PROCEDURE — G0439 PPPS, SUBSEQ VISIT: HCPCS | Performed by: FAMILY MEDICINE

## 2020-09-03 PROCEDURE — G0463 HOSPITAL OUTPT CLINIC VISIT: HCPCS

## 2020-09-03 PROCEDURE — 80053 COMPREHEN METABOLIC PANEL: CPT | Mod: ZL | Performed by: FAMILY MEDICINE

## 2020-09-03 PROCEDURE — 87088 URINE BACTERIA CULTURE: CPT | Mod: ZL | Performed by: FAMILY MEDICINE

## 2020-09-03 PROCEDURE — 87086 URINE CULTURE/COLONY COUNT: CPT | Mod: ZL | Performed by: FAMILY MEDICINE

## 2020-09-03 PROCEDURE — 81001 URINALYSIS AUTO W/SCOPE: CPT | Mod: ZL | Performed by: FAMILY MEDICINE

## 2020-09-03 RX ORDER — CYCLOBENZAPRINE HCL 10 MG
10 TABLET ORAL 3 TIMES DAILY PRN
Qty: 30 TABLET | Refills: 11 | Status: SHIPPED | OUTPATIENT
Start: 2020-09-03 | End: 2021-07-20

## 2020-09-03 RX ORDER — CIPROFLOXACIN 500 MG/1
500 TABLET, FILM COATED ORAL 2 TIMES DAILY
Qty: 10 TABLET | Refills: 0 | Status: SHIPPED | OUTPATIENT
Start: 2020-09-03 | End: 2020-09-08

## 2020-09-03 RX ORDER — HYDROCHLOROTHIAZIDE 25 MG/1
25 TABLET ORAL DAILY
Qty: 90 TABLET | Refills: 3 | Status: SHIPPED | OUTPATIENT
Start: 2020-09-03 | End: 2021-09-20

## 2020-09-03 RX ORDER — TRIAMCINOLONE ACETONIDE 1 MG/G
CREAM TOPICAL 2 TIMES DAILY
Qty: 30 G | Refills: 4 | Status: SHIPPED | OUTPATIENT
Start: 2020-09-03

## 2020-09-03 RX ORDER — ATORVASTATIN CALCIUM 20 MG/1
20 TABLET, FILM COATED ORAL DAILY
Qty: 90 TABLET | Refills: 3 | Status: SHIPPED | OUTPATIENT
Start: 2020-09-03 | End: 2021-09-20

## 2020-09-03 ASSESSMENT — MIFFLIN-ST. JEOR: SCORE: 1071.07

## 2020-09-03 ASSESSMENT — PAIN SCALES - GENERAL: PAINLEVEL: MILD PAIN (3)

## 2020-09-03 NOTE — LETTER
"Arlyn Holder  [address]   [place] [address]    9/4/2020      Dear Ms. Holder,      I wanted to let you know about your recent results.  Your lipids are in good range.    Your Comprehensive Metabolic Profile showed that your glucose was 111.  A normal fasting glucose should be under 100.  To meet criteria for diabetes, you would need to have a level of 126 or above.  This means that you are in the \"prediabetes\" category between 100-125.  This means that you do not yet have diabetes, but are at risk for developing itin the future.  Ways you can offset your risks for developing diabetes are to eat healthy (especially minimizing your carbohydrate intake), exercise and maintain a healthy weight.     You should have been contacted about your urinalysis results and starting antibiotics by phone.  Your final urine culture is still pending at the time of writing this letter.    Please contact us at [phone] with any questions or concerns that you have.    I attached your lab results for your records.        Sincerely,         Brooke Castaneda MD     Resulted Orders   UA reflex to Microscopic and Culture   Result Value Ref Range    Color Urine Light Yellow     Appearance Urine Clear     Glucose Urine Negative NEG^Negative mg/dL    Bilirubin Urine Negative NEG^Negative    Ketones Urine Negative NEG^Negative mg/dL    Specific Gravity Urine 1.013 1.003 - 1.035    Blood Urine Negative NEG^Negative    pH Urine 6.5 5.0 - 7.0 pH    Protein Albumin Urine Negative NEG^Negative mg/dL    Urobilinogen mg/dL Normal 0.0 - 2.0 mg/dL    Nitrite Urine Positive (A) NEG^Negative    Leukocyte Esterase Urine Negative NEG^Negative    Source Midstream Urine     RBC Urine 0 0 - 2 /HPF    WBC Urine 3 0 - 5 /HPF    Squamous Epithelial /HPF Urine 1 0 - 1 /HPF    Mucous Urine Present (A) NEG^Negative /LPF    Amorphous Crystals Few (A) NEG^Negative /HPF   Lipid Profile   Result Value Ref Range    Cholesterol 130 <200 mg/dL    " Triglycerides 111 <150 mg/dL    HDL Cholesterol 47 23 - 92 mg/dL    LDL Cholesterol Calculated 61 <100 mg/dL      Comment:      Desirable:       <100 mg/dl    Non HDL Cholesterol 83 <130 mg/dL   Comprehensive metabolic panel   Result Value Ref Range    Sodium 141 134 - 144 mmol/L    Potassium 3.6 3.5 - 5.1 mmol/L    Chloride 104 98 - 107 mmol/L    Carbon Dioxide 30 21 - 31 mmol/L    Anion Gap 7 3 - 14 mmol/L    Glucose 111 (H) 70 - 105 mg/dL    Urea Nitrogen 22 7 - 25 mg/dL    Creatinine 0.90 0.60 - 1.20 mg/dL    GFR Estimate 62 >60 mL/min/[1.73_m2]    GFR Estimate If Black 74 >60 mL/min/[1.73_m2]    Calcium 9.8 8.6 - 10.3 mg/dL    Bilirubin Total 0.8 0.3 - 1.0 mg/dL    Albumin 4.6 3.5 - 5.7 g/dL    Protein Total 7.4 6.4 - 8.9 g/dL    Alkaline Phosphatase 75 34 - 104 U/L    ALT 21 7 - 52 U/L    AST 20 13 - 39 U/L   Urine Culture Aerobic Bacterial   Result Value Ref Range    Specimen Description Midstream Urine     Culture Micro >100,000 colonies/mL  Gram positive cocci   (A)

## 2020-09-03 NOTE — PROGRESS NOTES
"   SUBJECTIVE:   CC: Arlyn Holder is an 70 year old woman who presents for preventive health visit.     HPI    Annual Wellness Visit    Has noted bulging with bowel movements in her vagina.  Her has a history of cystocele/recetocele, which were repaired in 2008 or 2009 by Dr. Mumtaz Marshall.  She has had subsequent recurrence of the cystocele/rectocele.   In March or April, she had a lot of pain in the area and felt a piece of mesh.  She has noted small pieces of mesh on occasion on her underpants or with wiping.  Hard to exercise because of the bulge.  Has had some pink discharge at times.  It has come from both her vagina and rectum.  She has had some constipation at times as well.  Only occasionally has issues with incontinence if she needs to wait too long to urinate.  There are times she has frequency.  She at times has trouble emptying her bladder and has to rock back and forth to get the urine to come out.    She has a history of a dilated ascending aorta and is to have yearly echocardiogram to follow this.    Are you in the first 12 months of your Medicare Part B coverage?  No    Physical Health:    In general, how would you rate your overall physical health? good    Outside of work, how many days during the week do you exercise?4-5 days/week    Outside of work, approximately how many minutes a day do you exercise?15-30 minutes    If you drink alcohol do you typically have >3 drinks per day or >7 drinks per week? Not Applicable    Do you usually eat at least 4 servings of fruit and vegetables a day, include whole grains & fiber and avoid regularly eating high fat or \"junk\" foods? Yes    Do you have any problems taking medications regularly? No    Do you have any side effects from medications? none    Needs assistance for the following daily activities: no assistance needed    Which of the following safety concerns are present in your home?  lack of grab bars in the bathroom     Hearing impairment: No    In the " past 6 months, have you been bothered by leaking of urine? yes    Mental Health:    In general, how would you rate your overall mental or emotional health? good  PHQ-2 Score: 0    Do you feel safe in your environment? Yes    Have you ever done Advance Care Planning? (For example, a Health Directive, POLST, or a discussion with a medical provider or your loved ones about your wishes)? Yes, patient states has an Advance Care Planning document and will bring a copy to the clinic.    Fall risk:  Fallen 2 or more times in the past year?: No  Any fall with injury in the past year?: No    Cognitive Screenin) Repeat 3 items (Leader, Season, Table)    2) Clock draw: NORMAL  3) 3 item recall: Recalls 3 objects  Results: 3 items recalled: COGNITIVE IMPAIRMENT LESS LIKELY    Mini-CogTM Copyright S Kurt. Licensed by the author for use in [facility] [employer]; reprinted with permission (flavia@[facility]). All rights reserved.      Do you have sleep apnea, excessive snoring or daytime drowsiness?: yes, daytime drowsiness    Current providers sharing in care for this patient include:   Patient Care Team:  Brooke Castaneda MD as PCP - General (Family Practice)  Jhonatan Burdick MD as Assigned PCP      Today's PHQ-2 Score:   PHQ-2 (  [employer]) 9/3/2020   Q1: Little interest or pleasure in doing things 0   Q2: Feeling down, depressed or hopeless 0   PHQ-2 Score 0       Abuse: Current or Past (Physical, Sexual or Emotional) - No  Do you feel safe in your environment? Yes    Have you ever done Advance Care Planning? (For example, a Health Directive, POLST, or a discussion with a medical provider or your loved ones about your wishes): Yes, patient states has an Advance Care Planning document and will bring a copy to the clinic.    Social History     Tobacco Use     Smoking status: Passive Smoke Exposure - Never Smoker     Smokeless tobacco: Never Used     Tobacco comment: Quit smoking:  was a smoker    Substance Use Topics     Alcohol use: No     Alcohol/week: 0.0 standard drinks     If you drink alcohol do you typically have >3 drinks per day or >7 drinks per week? Not applicable    Alcohol Use 9/3/2020   Prescreen: >3 drinks/day or >7 drinks/week? Not Applicable       Reviewed orders with patient.  Reviewed health maintenance and updated orders accordingly - Yes  Lab work is in process  BP Readings from Last 3 Encounters:   09/03/20 132/78   11/14/19 126/80   04/18/19 128/82    Wt Readings from Last 3 Encounters:   09/03/20 67.1 kg (148 lb)   11/14/19 68.2 kg (150 lb 6.4 oz)   04/18/19 67.1 kg (148 lb)                  Patient Active Problem List   Diagnosis     Arthritis of right wrist     Ascending aorta dilatation (H)     Enthesopathy of hip region     Carpal tunnel syndrome     Degeneration of cervical intervertebral disc     Chondromalacia of patella     GERD (gastroesophageal reflux disease)     Hyperlipidemia     Hypertension     Osteoarthritis of ankle or foot     Plantar fasciitis     Disorder of bursae and tendons in shoulder region     Tendonitis of wrist, right     Lumbosacral spondylosis without myelopathy     Lesion of lateral popliteal nerve     Past Surgical History:   Procedure Laterality Date     ARTHROSCOPY SHOULDER ROTATOR CUFF REPAIR Right 4/11/2018    Procedure: ARTHROSCOPY SHOULDER ROTATOR CUFF REPAIR;  Right Shoulder Arthroscopy with Subacromial Decompression, Distal Clavicle Excision, Rotator Cuff Repair, Biceps Tenotomy, Extensive Debridement;  Surgeon: Wilner Painting DO;  Location: [facility] OR     AS SHOULDER ARTHROSCOPY, DX Right 4/11/18    Dr. Painting     BUNIONECTOMY      1999,right     COLONOSCOPY      2004, normal     COLONOSCOPY  03/16/2015    Normal exam, F/U 2025     HYSTERECTOMY VAGINAL      1982,for uterine prolapse with A&P repair.  Tube and ovaries still in place.     LAPAROSCOPY DIAGNOSTIC (GYN)      1989,for pelvic adhesion     LAPAROSCOPY DIAGNOSTIC (GYN)       11/08,vaginal vault prolapse - Dr. Mumtaz Marshall - anterior/posterior repair with bladder mesh.     RELEASE CARPAL TUNNEL      Dr. Méndez     S/P RIGHT SHOULDER ARTHROSCOPY Right 04/11/2018       Social History     Tobacco Use     Smoking status: Passive Smoke Exposure - Never Smoker     Smokeless tobacco: Never Used     Tobacco comment: Quit smoking:  was a smoker   Substance Use Topics     Alcohol use: No     Alcohol/week: 0.0 standard drinks     Family History   Problem Relation Age of Onset     Diabetes Father         Diabetes     Hypertension Father         Hypertension     Heart Disease Father         Heart Disease     Other - See Comments Father          hearing loss, kidney problems.     Heart Disease Mother         Heart Disease     Hypertension Mother         Hypertension     Cancer Maternal Grandmother         Cancer,bone cancer in her leg     Other - See Comments Maternal Grandmother         Psychiatric illness,depression     Heart Disease Son         Heart Disease,MI - first at age 48     Breast Cancer Sister         Cancer-breast     Diabetes Sister         Diabetes     Heart Disease Sister         Heart Disease,angioplasty CAD     Other - See Comments Sister         Right ear deafness - surgically repaired     Other - See Comments Sister         depression     Diabetes Sister         Diabetes     Hypertension Sister         Hypertension     Other - See Comments Sister         car accident age 75 with multiple fractures - in nursing home.     Heart Disease Brother         Heart Disease     Heart Disease Brother         Heart Disease     Hypertension Brother         Hypertension     Other - See Comments Brother         vertigo, also kidney problems.         Current Outpatient Medications   Medication Sig Dispense Refill     aspirin (ASPIRIN LOW DOSE) 81 MG tablet Take by mouth daily 30 tablet      atorvastatin (LIPITOR) 20 MG tablet Take 1 tablet (20 mg) by mouth daily 90 tablet 3      ciprofloxacin (CIPRO) 500 MG tablet Take 1 tablet (500 mg) by mouth 2 times daily for 5 days 10 tablet 0     Cyanocobalamin (VITAMIN B-12 CR) 1000 MCG TBCR        cyclobenzaprine (FLEXERIL) 10 MG tablet Take 1 tablet (10 mg) by mouth 3 times daily as needed for muscle spasms 30 tablet 11     hydrochlorothiazide (HYDRODIURIL) 25 MG tablet Take 1 tablet (25 mg) by mouth daily 90 tablet 3     lecithin 400 MG CAPS Take 1 capsule by mouth 2 times daily       Multiple Vitamins-Minerals (MENS MULTIVITAMIN PLUS) TABS  30 tablet      triamcinolone (KENALOG) 0.1 % external cream Apply topically 2 times daily 30 g 4     vitamin E (TOCOPHEROL) 100 UNIT capsule        Allergies   Allergen Reactions     Codeine Nausea and Vomiting     Diazepam      Other reaction(s): Hallucinations     Naproxen Other (See Comments)     Mouth sores     Sulfa Drugs Unknown     Chlorhexidine Rash     Sulfacetamide Rash     Had a reaction to sulfa drug but is not sure which one it was-states the reaction resulted in redness       Mammogram Screening: Patient over age 50, mutual decision to screen reflected in health maintenance.    Pertinent mammograms are reviewed under the imaging tab.  History of abnormal Pap smear: NO - age 63 - see link Cervical Cytology Screening Guidelines     Reviewed and updated as needed this visit by clinical staff  Tobacco  Allergies  Meds  Med Hx  Surg Hx  Fam Hx  Soc Hx        Reviewed and updated as needed this visit by Provider        Past Medical History:   Diagnosis Date     AC (acromioclavicular) arthritis 3/1/2018     Bunion of great toe of right foot     No Comments Provided     Carpal tunnel syndrome     11/18/2010     Complete tear of right rotator cuff 3/1/2018     Essential (primary) hypertension     No Comments Provided     Gastro-esophageal reflux disease without esophagitis     12/3/2012     Hyperlipidemia     10/4/2011     Impingement syndrome of right shoulder     10/09/07     Personal history of  other medical treatment (CODE)      3, Para 3 with three vaginal deliveries     S/P arthroscopy of right shoulder 2018     Thoracic aortic ectasia (H)     3/23/2016,3.5-3.6 cm on CT 3/2016 - follow up in 1 year     Uterovaginal prolapse     preloader comment: selections available to preload differs from paper chart.   *Pelvic floor relaxation      Past Surgical History:   Procedure Laterality Date     ARTHROSCOPY SHOULDER ROTATOR CUFF REPAIR Right 2018    Procedure: ARTHROSCOPY SHOULDER ROTATOR CUFF REPAIR;  Right Shoulder Arthroscopy with Subacromial Decompression, Distal Clavicle Excision, Rotator Cuff Repair, Biceps Tenotomy, Extensive Debridement;  Surgeon: Wilner Painting DO;  Location: [facility] OR     AS SHOULDER ARTHROSCOPY, DX Right 18    Dr. Painting     BUNIONECTOMY      ,right     COLONOSCOPY      , normal     COLONOSCOPY  2015    Normal exam, F/U      HYSTERECTOMY VAGINAL      ,for uterine prolapse with A&P repair.  Tube and ovaries still in place.     LAPAROSCOPY DIAGNOSTIC (GYN)      ,for pelvic adhesion     LAPAROSCOPY DIAGNOSTIC (GYN)      ,vaginal vault prolapse - Dr. Mumtaz Marshall - anterior/posterior repair with bladder mesh.     RELEASE CARPAL TUNNEL      Dr. Méndez     S/P RIGHT SHOULDER ARTHROSCOPY Right 2018       Review of Systems  CONSTITUTIONAL: NEGATIVE for fever, chills, change in weight  INTEGUMENTARY/SKIN: NEGATIVE for worrisome rashes, moles or lesions  EYES: NEGATIVE for vision changes or irritation  ENT: NEGATIVE for ear, mouth and throat problems  RESP: NEGATIVE for significant cough or SOB  BREAST: NEGATIVE for masses, tenderness or discharge  CV: NEGATIVE for chest pain, palpitations or peripheral edema  GI: NEGATIVE for nausea, abdominal pain, heartburn, or change in bowel habits  : see above.  MUSCULOSKELETAL: NEGATIVE for significant arthralgias or myalgia  NEURO: NEGATIVE for weakness, dizziness or  "paresthesias  PSYCHIATRIC: NEGATIVE for changes in mood or affect      OBJECTIVE:   /78 (BP Location: Right arm, Patient Position: Sitting, Cuff Size: Adult Regular)   Pulse 65   Temp 97.5  F (36.4  C) (Tympanic)   Resp 18   Ht 1.473 m (4' 10\")   Wt 67.1 kg (148 lb)   LMP  (LMP Unknown)   SpO2 98%   Breastfeeding No   BMI 30.93 kg/m    Physical Exam  GENERAL APPEARANCE: healthy, alert and no distress  EYES: Eyes grossly normal to inspection, PERRL and conjunctivae and sclerae normal  HENT: ear canals and TM's normal, nose and mouth without ulcers or lesions, oropharynx clear and oral mucous membranes moist  NECK: no adenopathy, no asymmetry, masses, or scars and thyroid normal to palpation  RESP: lungs clear to auscultation - no rales, rhonchi or wheezes  BREAST: normal without masses, tenderness or nipple discharge and no palpable axillary masses or adenopathy  CV: regular rate and rhythm, normal S1 S2, no S3 or S4, no murmur, click or rub, no peripheral edema and peripheral pulses strong  ABDOMEN: soft, nontender, no hepatosplenomegaly, no masses and bowel sounds normal   (female): normal female external genitalia, normal urethral meatus.  She has a large bulge of vaginal tissue at the introitus, of about 3 cm in diameter.  She has a large cystocele and rectocele.  She also has visible mesh eroded through the anterior portion of her vaginal wall.  MS: no musculoskeletal defects are noted and gait is age appropriate without ataxia  SKIN: no suspicious lesions or rashes  NEURO: Normal strength and tone, sensory exam grossly normal, mentation intact and speech normal  PSYCH: mentation appears normal and affect normal/bright    Diagnostic Test Results:  Color Urine (no units)   Date Value   09/03/2020 Light Yellow     Appearance Urine (no units)   Date Value   09/03/2020 Clear     Glucose Urine (mg/dL)   Date Value   09/03/2020 Negative     Bilirubin Urine (no units)   Date Value   09/03/2020 Negative "     Ketones Urine (mg/dL)   Date Value   09/03/2020 Negative     Specific Gravity Urine (no units)   Date Value   09/03/2020 1.013     pH Urine (pH)   Date Value   09/03/2020 6.5     Protein Albumin Urine (mg/dL)   Date Value   09/03/2020 Negative     Nitrite Urine (no units)   Date Value   09/03/2020 Positive (A)     Leukocyte Esterase Urine (no units)   Date Value   09/03/2020 Negative         ASSESSMENT/PLAN:       ICD-10-CM    1. Encounter for Medicare annual wellness exam  Z00.00 Urine Culture Aerobic Bacterial   2. Cystocele with rectocele  N81.10 OB/GYN REFERRAL    N81.6    3. Erosion of implanted vaginal mesh to surrounding tissue, initial encounter (H)  T83.711A OB/GYN REFERRAL   4. Ascending aorta dilatation (H)  I77.810 Echocardiogram Complete   5. Pure hypercholesterolemia  E78.00 atorvastatin (LIPITOR) 20 MG tablet     Comprehensive metabolic panel     Lipid Profile     Lipid Profile     Comprehensive metabolic panel   6. Back pain, unspecified back location, unspecified back pain laterality, unspecified chronicity  M54.9 cyclobenzaprine (FLEXERIL) 10 MG tablet   7. Essential hypertension  I10 hydrochlorothiazide (HYDRODIURIL) 25 MG tablet     Comprehensive metabolic panel     Comprehensive metabolic panel   8. Dermatitis  L30.9 triamcinolone (KENALOG) 0.1 % external cream   9. Urinary tract infection without hematuria, site unspecified  N39.0 UA reflex to Microscopic and Culture     Urine Culture Aerobic Bacterial     ciprofloxacin (CIPRO) 500 MG tablet     1.  Mammogram is up to date, last completed 10/4/2019.  DEXA last completed on 5/1/2019 was normal.  Last colonoscopy was 3/16/2015 and was normal.  Pap Smear deferred due to prior hysterectomy and also age >65.  Vaccines reviewed.  Her Tdap is up to date, as are her prevnar & pneumovax.  She has not yet had a shingrix, but declines that today.  2.  Referred to OB GYN for further evaluation and treatment recommendations.  3.  See #2.  Discussed that  "she may need further referral to urogynecology pending OB GYN's recommendations.  4.  Echocardiogram ordered to recheck ascending aorta aneurysm.  5.  Atorvastatin refilled.  Comprehensive Metabolic Profile and lipids as above.  6.  Cyclobenzaprine refilled.  She is aware of the increased risk of this medication over the age of 65.  She used this very infrequently.  7.  Blood pressure stable.  Hydrochlorothiazide refilled today.  Labs as above.  8.  Kenalog refilled.  9.  With her urinary symptoms, urinalysis was completed which looks suspicious for urinary tract infection.  Prescription for ciprofloxacin sent to pharmacy.  Urine culture pending.      COUNSELING:  Reviewed preventive health counseling, as reflected in patient instructions       Regular exercise       Healthy diet/nutrition       Vision screening       Immunizations    Declined: Shingrix due to Other                Osteoporosis Prevention/Bone Health       Colon cancer screening       Advance Care Planning    Estimated body mass index is 30.93 kg/m  as calculated from the following:    Height as of this encounter: 1.473 m (4' 10\").    Weight as of this encounter: 67.1 kg (148 lb).    Weight management plan: Discussed healthy diet and exercise guidelines    She reports that she is a non-smoker but has been exposed to tobacco smoke. She has never used smokeless tobacco.      Counseling Resources:  ATP IV Guidelines  Pooled Cohorts Equation Calculator  Breast Cancer Risk Calculator  BRCA-Related Cancer Risk Assessment: FHS-7 Tool  FRAX Risk Assessment  ICSI Preventive Guidelines  Dietary Guidelines for Americans, 2010  USDA's MyPlate  ASA Prophylaxis  Lung CA Screening    Brooke Castaneda MD  [facility]  "

## 2020-09-03 NOTE — NURSING NOTE
Patient presents to clinic for Medicare annual wellness visit.  Medication Reconciliation: complete    Meagan Morales LPN

## 2020-09-03 NOTE — PATIENT INSTRUCTIONS
Patient Education   Personalized Prevention Plan  You are due for the preventive services outlined below.  Your care team is available to assist you in scheduling these services.  If you have already completed any of these items, please share that information with your care team to update in your medical record.  Health Maintenance Due   Topic Date Due     Hepatitis C Screening  1950     Discuss Advance Care Planning  1950     Zoster (Shingles) Vaccine (1 of 2) 05/13/1998     Annual Wellness Visit  05/13/2013     PHQ-2  01/01/2020     FALL RISK ASSESSMENT  04/18/2020     Flu Vaccine (1) 09/01/2020

## 2020-09-04 DIAGNOSIS — E87.6 HYPOKALEMIA: Primary | ICD-10-CM

## 2020-09-04 RX ORDER — POTASSIUM CHLORIDE 1500 MG/1
TABLET, EXTENDED RELEASE ORAL
COMMUNITY
Start: 2020-08-25 | End: 2020-09-04

## 2020-09-04 RX ORDER — POTASSIUM CHLORIDE 1500 MG/1
20 TABLET, EXTENDED RELEASE ORAL DAILY
Qty: 90 TABLET | Refills: 3 | Status: SHIPPED | OUTPATIENT
Start: 2020-09-04 | End: 2021-06-30

## 2020-09-04 NOTE — TELEPHONE ENCOUNTER
Patient states she is still taking the Potassium medication and would like refills sent into [facility].    Meagan Morales LPN............9/4/2020 1:55 PM

## 2020-09-04 NOTE — TELEPHONE ENCOUNTER
Had an appt yesterday and her after visit summary says she is suppose to quit taking her Potassium Chloride and she is wanting to know if she is actually suppose to quit taking it.

## 2020-09-06 LAB
BACTERIA SPEC CULT: ABNORMAL
SPECIMEN SOURCE: ABNORMAL

## 2020-09-14 ENCOUNTER — OFFICE VISIT (OUTPATIENT)
Dept: OBGYN | Facility: OTHER | Age: 70
End: 2020-09-14
Attending: OBSTETRICS & GYNECOLOGY
Payer: COMMERCIAL

## 2020-09-14 VITALS
HEART RATE: 68 BPM | OXYGEN SATURATION: 98 % | RESPIRATION RATE: 18 BRPM | SYSTOLIC BLOOD PRESSURE: 126 MMHG | BODY MASS INDEX: 31.35 KG/M2 | WEIGHT: 150 LBS | DIASTOLIC BLOOD PRESSURE: 76 MMHG

## 2020-09-14 DIAGNOSIS — N99.3 VAGINAL VAULT PROLAPSE, POSTHYSTERECTOMY: Primary | ICD-10-CM

## 2020-09-14 DIAGNOSIS — N81.6 CYSTOCELE WITH RECTOCELE: ICD-10-CM

## 2020-09-14 DIAGNOSIS — T83.711A EROSION OF IMPLANTED VAGINAL MESH TO SURROUNDING TISSUE, INITIAL ENCOUNTER (H): ICD-10-CM

## 2020-09-14 DIAGNOSIS — N81.10 CYSTOCELE WITH RECTOCELE: ICD-10-CM

## 2020-09-14 PROCEDURE — 99203 OFFICE O/P NEW LOW 30 MIN: CPT | Performed by: OBSTETRICS & GYNECOLOGY

## 2020-09-14 PROCEDURE — G0463 HOSPITAL OUTPT CLINIC VISIT: HCPCS

## 2020-09-14 ASSESSMENT — PAIN SCALES - GENERAL: PAINLEVEL: NO PAIN (0)

## 2020-09-14 NOTE — PROGRESS NOTES
Arlyn is a very pleasant 70-year-old, referred by [facility] for pelvic organ prolapse and probable erosion largely for of vaginal mesh.    Complicated GYN history.  Back in the early 80s had a hysterectomy and bladder repair largely for pelvic organ prolapse.  In 2009 had a anterior and posterior repair with mid urethral sling per Dr. Marshall and James.  Early after's dated she still had some pressure and is just had a gradual increase in pressure bulging and a difficulty emptying her bladder.  Also feels that she has an eroded area of mesh.  Has a constant low-grade odor.  No significant bleeding.  Describes both urgency incontinence of the bladder and occasional rectal incontinence.    Problem list is reviewed on epic and includes the following:  Ascending aorta dilation  Hypertension  Multi-joint arthritis  Hyperlipidemia    Occasions reviewed in epic    Past surgical history reviewed on epic including the above.    Social history: .  Her  has had a stroke though and she cares for him.  He is not sexually active.    GYN, , GI review of systems otherwise negative    Physical exam:  Patient alert orientated x3  Blood pressure 126/76, pulse 68, afebrile  Abdomen grade 1-2 soft benign  No inguinal lymphadenopathy  Pelvic: Vulva is negative for lesion including mons labia majora labia minora.  Perirectal hemorrhoids present.  Fairly gaping introitus with a somewhat decreased perineal support.  With Valsalva there is obvious vaginal vault prolapse and large cystocele with erosion of at least 4 cm of the midline mid urethral sling mesh.  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  Erosion of mid urethral sling, longstanding.  Previous repairs x2  Not sexually active.  Medical issues as noted above.    Plan:  The above discussed.  Diagrams utilized.  Would recommend if she does this decides to proceed repair of an  enterocele with anterior and posterior colporrhaphy with a relative colpocleisis.  At the same time the eroded sling would be re-dissected.  In essence the vagina would largely be obliterated.  This should result in good long-lasting support    Along the discussion patient would like to consider scheduling.  We will have her preop with [facility].  All questions answered.  Risks benefits reviewed.

## 2020-09-14 NOTE — PROGRESS NOTES
"Date of Surgery: 10/19/2020  Type of Surgery: COLPORRHAPHY, COMBINED ANTEROPOSTERIOR, ENTEROCELE REPAIR, COLPOCLIESIS, CYSTOSCOPY, Repair of eroded mesh  Surgeon: Dr. Wade Reyna MD      Patient's consents were signed and appropriate appointments were scheduled by the [facility] . Patient was given surgical folder which includes pre-operative bathing instructions related to the two packets of Hibiclens surgical prep provided. Surgical forms were copied and kept for informative purposes. Originals were delivered to Day-surgery. Patient denies questions at this time.        STOP BANG    Fever/Chills or other infectious symptoms in past month? no  >10 pound weight loss in the past 2 months? no  Health Care Directive on file? no  History of blood transfusions? no  Td up to date? yes  History of VRE/MRSA? no      Obstructive Sleep Apnea screening    Preoperative Evaluation: Obstructive Sleep Apnea screening    S: Snore -  Do you snore loudly? (louder than talking or loud enough to be heard through closed doors)no  T: Tired - Do you often feel tired, fatigued, or sleepy during the daytime?yes  O: Observed - Has anyone ever observed you stop breathing during your sleep?no  P: Pressure - Do you have or are you being treated for high blood pressure?yes  B: BMI - BMI greater than 35kg/m2?no  A: Age - Age over 50 years old?yes  N: Neck - Neck circumference greater than 40 cm?no  G: Gender - Gender: Male?no    Total number of \"YES\" responses:  3    Scoring: Low risk of ANURAG 0-2  At Risk of ANURAG: >3 High Risk of ANURAG: 5-8      Total yes answers in ANURAG section:    At risk 3-4      Pattie Reyna RN............. 9/14/2020 9:26 AM   "

## 2020-09-14 NOTE — NURSING NOTE
"Chief Complaint   Patient presents with     Consult     follow up mesh problem       Initial /76 (BP Location: Right arm, Patient Position: Sitting, Cuff Size: Adult Regular)   Pulse 68   Resp 18   Wt 68 kg (150 lb)   LMP  (LMP Unknown)   SpO2 98%   Breastfeeding No   BMI 31.35 kg/m   Estimated body mass index is 31.35 kg/m  as calculated from the following:    Height as of 9/3/20: 1.473 m (4' 10\").    Weight as of this encounter: 68 kg (150 lb).  Medication Reconciliation: complete    Floresita Carroll LPN  "

## 2020-09-15 ENCOUNTER — HOSPITAL ENCOUNTER (OUTPATIENT)
Dept: CARDIOLOGY | Facility: OTHER | Age: 70
Discharge: HOME | End: 2020-09-15
Attending: FAMILY MEDICINE | Admitting: FAMILY MEDICINE
Payer: MEDICARE

## 2020-09-15 DIAGNOSIS — I77.810 ASCENDING AORTA DILATATION (H): ICD-10-CM

## 2020-09-15 PROCEDURE — 93306 TTE W/DOPPLER COMPLETE: CPT | Mod: 26 | Performed by: INTERNAL MEDICINE

## 2020-09-15 PROCEDURE — 93306 TTE W/DOPPLER COMPLETE: CPT

## 2020-10-01 ENCOUNTER — OFFICE VISIT (OUTPATIENT)
Dept: FAMILY MEDICINE | Facility: OTHER | Age: 70
End: 2020-10-01
Attending: FAMILY MEDICINE
Payer: MEDICARE

## 2020-10-01 VITALS
HEART RATE: 75 BPM | WEIGHT: 150.13 LBS | BODY MASS INDEX: 31.51 KG/M2 | RESPIRATION RATE: 16 BRPM | HEIGHT: 58 IN | SYSTOLIC BLOOD PRESSURE: 130 MMHG | DIASTOLIC BLOOD PRESSURE: 84 MMHG | OXYGEN SATURATION: 98 % | TEMPERATURE: 98.3 F

## 2020-10-01 DIAGNOSIS — Z01.818 PREOP GENERAL PHYSICAL EXAM: Primary | ICD-10-CM

## 2020-10-01 DIAGNOSIS — I10 ESSENTIAL HYPERTENSION: ICD-10-CM

## 2020-10-01 DIAGNOSIS — Z13.0 SCREENING FOR DEFICIENCY ANEMIA: ICD-10-CM

## 2020-10-01 DIAGNOSIS — E87.6 HYPOKALEMIA: ICD-10-CM

## 2020-10-01 DIAGNOSIS — Z23 NEEDS FLU SHOT: ICD-10-CM

## 2020-10-01 LAB
ANION GAP SERPL CALCULATED.3IONS-SCNC: 9 MMOL/L (ref 3–14)
BASOPHILS # BLD AUTO: 0.1 10E9/L (ref 0–0.2)
BASOPHILS NFR BLD AUTO: 0.7 %
BUN SERPL-MCNC: 22 MG/DL (ref 7–25)
CALCIUM SERPL-MCNC: 9.8 MG/DL (ref 8.6–10.3)
CHLORIDE SERPL-SCNC: 102 MMOL/L (ref 98–107)
CO2 SERPL-SCNC: 30 MMOL/L (ref 21–31)
CREAT SERPL-MCNC: 0.84 MG/DL (ref 0.6–1.2)
DIFFERENTIAL METHOD BLD: NORMAL
EOSINOPHIL # BLD AUTO: 0.2 10E9/L (ref 0–0.7)
EOSINOPHIL NFR BLD AUTO: 3.1 %
ERYTHROCYTE [DISTWIDTH] IN BLOOD BY AUTOMATED COUNT: 13.3 % (ref 10–15)
GFR SERPL CREATININE-BSD FRML MDRD: 67 ML/MIN/{1.73_M2}
GLUCOSE SERPL-MCNC: 102 MG/DL (ref 70–105)
HCT VFR BLD AUTO: 42 % (ref 35–47)
HGB BLD-MCNC: 13.5 G/DL (ref 11.7–15.7)
IMM GRANULOCYTES # BLD: 0 10E9/L (ref 0–0.4)
IMM GRANULOCYTES NFR BLD: 0.1 %
INTERPRETATION ECG - MUSE: NORMAL
LYMPHOCYTES # BLD AUTO: 2.1 10E9/L (ref 0.8–5.3)
LYMPHOCYTES NFR BLD AUTO: 30.8 %
MCH RBC QN AUTO: 29.3 PG (ref 26.5–33)
MCHC RBC AUTO-ENTMCNC: 32.1 G/DL (ref 31.5–36.5)
MCV RBC AUTO: 91 FL (ref 78–100)
MONOCYTES # BLD AUTO: 0.8 10E9/L (ref 0–1.3)
MONOCYTES NFR BLD AUTO: 11.2 %
NEUTROPHILS # BLD AUTO: 3.7 10E9/L (ref 1.6–8.3)
NEUTROPHILS NFR BLD AUTO: 54.1 %
PLATELET # BLD AUTO: 257 10E9/L (ref 150–450)
POTASSIUM SERPL-SCNC: 3.8 MMOL/L (ref 3.5–5.1)
RBC # BLD AUTO: 4.61 10E12/L (ref 3.8–5.2)
SODIUM SERPL-SCNC: 141 MMOL/L (ref 134–144)
WBC # BLD AUTO: 6.8 10E9/L (ref 4–11)

## 2020-10-01 PROCEDURE — 80048 BASIC METABOLIC PNL TOTAL CA: CPT | Mod: ZL | Performed by: FAMILY MEDICINE

## 2020-10-01 PROCEDURE — G0463 HOSPITAL OUTPT CLINIC VISIT: HCPCS | Mod: 25

## 2020-10-01 PROCEDURE — 36415 COLL VENOUS BLD VENIPUNCTURE: CPT | Mod: ZL | Performed by: FAMILY MEDICINE

## 2020-10-01 PROCEDURE — 85025 COMPLETE CBC W/AUTO DIFF WBC: CPT | Mod: ZL | Performed by: FAMILY MEDICINE

## 2020-10-01 PROCEDURE — G0008 ADMIN INFLUENZA VIRUS VAC: HCPCS

## 2020-10-01 PROCEDURE — 93010 ELECTROCARDIOGRAM REPORT: CPT | Performed by: INTERNAL MEDICINE

## 2020-10-01 PROCEDURE — 99214 OFFICE O/P EST MOD 30 MIN: CPT | Performed by: FAMILY MEDICINE

## 2020-10-01 PROCEDURE — 93005 ELECTROCARDIOGRAM TRACING: CPT

## 2020-10-01 ASSESSMENT — MIFFLIN-ST. JEOR: SCORE: 1080.71

## 2020-10-01 ASSESSMENT — PAIN SCALES - GENERAL: PAINLEVEL: SEVERE PAIN (6)

## 2020-10-01 NOTE — PROGRESS NOTES
[facility] [place]  [address]  [place] [address]  Phone: [phone]  Fax: [phone]  Primary Provider: Brooke Hastings  Pre-op Performing Provider: BROOKE HASTINGS    PREOPERATIVE EVALUATION:  Today's date: 10/1/2020    Arlyn Holder is a 70 year old female who presents for a preoperative evaluation.    Surgical Information:  Surgery Details 10/1/2020   Surgery/Procedure: cystocele with rectocele, vaginal prolapse   Surgery Location: [facility]   Surgeon: Dr. Wade Reyna   Surgery Date: 10/19/20   Time of Surgery: TBD   Where patient plans to recover: At home with family     Fax number for surgical facility: Note does not need to be faxed, will be available electronically in Epic.  Type of Anesthesia Anticipated: to be determined    Subjective     HPI related to upcoming procedure: Arlyn has had issues with discomfort related to vaginal prolapse as well as a cystocele and rectocele.  She did have surgery several years ago.  She has had erosion of mesh used for previous surgery through her vaginal wall.    Preop Questions 10/1/2020   1. Have you ever had a heart attack or stroke? No   2. Have you ever had surgery on your heart or blood vessels, such as a stent placement, a coronary artery bypass, or surgery on an artery in your head, neck, heart, or legs? No   3. Do you have chest pain with activity? YES - occasionally.  Not on a regular basis.  Thinks it is more related to gastroesophageal reflux disease.  Some foods seem to precipitate it for her (acidic foods, caffeine, chocolate, mints)   4. Do you have a history of  heart failure? No   5. Do you currently have a cold, bronchitis or symptoms of other infection? No   6. Do you have a cough, shortness of breath, or wheezing? No   7. Do you or anyone in your family have previous history of blood clots? YES - father   8. Do you or does anyone in your family have a serious bleeding problem such as prolonged  bleeding following surgeries or cuts? No   9. Have you ever had problems with anemia or been told to take iron pills? YES - in childbearing years associated with pregnancy   10. Have you had any abnormal blood loss such as black, tarry or bloody stools, or abnormal vaginal bleeding? YES - in January, rectal and vaginal spotting.  Hasn't had this for several month.   11. Have you ever had a blood transfusion? No   Are you willing to have a blood transfusion if it is medically needed before, during, or after your surgery? Yes   13. Have you or any of your relatives ever had problems with anesthesia? YES - sick and weak for a period of time after a prior surgery.  Was told that she had migrainous vertigo related to an anesthetic used.   14. Do you have sleep apnea, excessive snoring or daytime drowsiness? No   15. Do you have any artifical heart valves or other implanted medical devices like a pacemaker, defibrillator, or continuous glucose monitor? No   16. Do you have artificial joints? No   17. Are you allergic to latex? No   18. Is there any chance that you may be pregnant? No     Patient does not have a Health Care Directive or Living Will: Discussed advance care planning with patient; information given to patient to review.      RX [place] ([place] reviewed:  reviewed- no concerns      Review of Systems  CONSTITUTIONAL: NEGATIVE for fever, chills, change in weight  INTEGUMENTARY/SKIN: NEGATIVE for worrisome rashes, moles or lesions  EYES: NEGATIVE for vision changes or irritation  ENT/MOUTH: NEGATIVE for ear, mouth and throat problems  RESP: NEGATIVE for significant cough or SOB  BREAST: NEGATIVE for masses, tenderness or discharge  CV: NEGATIVE for chest pain, palpitations or peripheral edema  GI: NEGATIVE for nausea, abdominal pain, heartburn, or change in bowel habits  : NEGATIVE for frequency, dysuria, or hematuria  MUSCULOSKELETAL: NEGATIVE for significant arthralgias or myalgia  NEURO:  NEGATIVE for weakness, dizziness or paresthesias  ENDOCRINE: NEGATIVE for temperature intolerance, skin/hair changes  HEME: NEGATIVE for bleeding problems  PSYCHIATRIC: NEGATIVE for changes in mood or affect    Patient Active Problem List    Diagnosis Date Noted     Vaginal vault prolapse, posthysterectomy 09/14/2020     Priority: Medium     Added automatically from request for surgery [id number]       Cystocele with rectocele 09/14/2020     Priority: Medium     Added automatically from request for surgery [id number]       Erosion of implanted vaginal mesh to surrounding tissue, initial encounter (H) 09/14/2020     Priority: Medium     Added automatically from request for surgery [id number]       Enthesopathy of hip region 02/15/2018     Priority: Medium     Degeneration of cervical intervertebral disc 02/15/2018     Priority: Medium     Overview:   Multilevel degenerative disk disease in cervical lumbar spine without   radiculopathy.  Add also possible right cubital tunnel syndrome at elbow.       Chondromalacia of patella 02/15/2018     Priority: Medium     Hypertension 02/15/2018     Priority: Medium     Ascending aorta dilatation (H) 03/23/2016     Priority: Medium     Overview:   3.5-3.6 cm on CT 3/2016 - follow up in 1 year       Arthritis of right wrist 11/07/2013     Priority: Medium     GERD (gastroesophageal reflux disease) 12/03/2012     Priority: Medium     Tendonitis of wrist, right 12/03/2012     Priority: Medium     Hyperlipidemia 10/04/2011     Priority: Medium     Osteoarthritis of ankle or foot 10/04/2011     Priority: Medium     Plantar fasciitis 10/04/2011     Priority: Medium     Carpal tunnel syndrome 11/18/2010     Priority: Medium     Disorder of bursae and tendons in shoulder region 11/18/2010     Priority: Medium     Lesion of lateral popliteal nerve 10/11/2006     Priority: Medium     Lumbosacral spondylosis without myelopathy 10/04/2006     Priority: Medium      Past Medical History:   Diagnosis  Date     AC (acromioclavicular) arthritis 3/1/2018     Bunion of great toe of right foot     No Comments Provided     Carpal tunnel syndrome     2010     Complete tear of right rotator cuff 3/1/2018     Essential (primary) hypertension     No Comments Provided     Gastro-esophageal reflux disease without esophagitis     12/3/2012     Hyperlipidemia     10/4/2011     Impingement syndrome of right shoulder     10/09/07     Personal history of other medical treatment (CODE)      3, Para 3 with three vaginal deliveries     S/P arthroscopy of right shoulder 2018     Thoracic aortic ectasia (H)     3/23/2016,3.5-3.6 cm on CT 3/2016 - follow up in 1 year     Uterovaginal prolapse     preloader comment: selections available to preload differs from paper chart.   *Pelvic floor relaxation     Past Surgical History:   Procedure Laterality Date     ARTHROSCOPY SHOULDER ROTATOR CUFF REPAIR Right 2018    Procedure: ARTHROSCOPY SHOULDER ROTATOR CUFF REPAIR;  Right Shoulder Arthroscopy with Subacromial Decompression, Distal Clavicle Excision, Rotator Cuff Repair, Biceps Tenotomy, Extensive Debridement;  Surgeon: Wilner Painting DO;  Location: [facility] OR     AS SHOULDER ARTHROSCOPY, DX Right 18    Dr. Painting     BUNIONECTOMY      ,right     COLONOSCOPY      , normal     COLONOSCOPY  2015    Normal exam, F/U      HYSTERECTOMY VAGINAL      ,for uterine prolapse with A&P repair.  Tube and ovaries still in place.     LAPAROSCOPY DIAGNOSTIC (GYN)      ,for pelvic adhesion     LAPAROSCOPY DIAGNOSTIC (GYN)      ,vaginal vault prolapse - Dr. Mumtaz Marshall - anterior/posterior repair with bladder mesh.     RELEASE CARPAL TUNNEL      Dr. Méndez     S/P RIGHT SHOULDER ARTHROSCOPY Right 2018     Current Outpatient Medications   Medication Sig Dispense Refill     aspirin (ASPIRIN LOW DOSE) 81 MG tablet Take by mouth daily 30 tablet      atorvastatin (LIPITOR) 20 MG tablet Take 1  tablet (20 mg) by mouth daily 90 tablet 3     Cyanocobalamin (VITAMIN B-12 CR) 1000 MCG TBCR        cyclobenzaprine (FLEXERIL) 10 MG tablet Take 1 tablet (10 mg) by mouth 3 times daily as needed for muscle spasms 30 tablet 11     hydrochlorothiazide (HYDRODIURIL) 25 MG tablet Take 1 tablet (25 mg) by mouth daily 90 tablet 3     lecithin 400 MG CAPS Take 1 capsule by mouth 2 times daily       Multiple Vitamins-Minerals (MENS MULTIVITAMIN PLUS) TABS  30 tablet      potassium chloride ER (KLOR-CON M) 20 MEQ CR tablet Take 1 tablet (20 mEq) by mouth daily 90 tablet 3     triamcinolone (KENALOG) 0.1 % external cream Apply topically 2 times daily 30 g 4     vitamin E (TOCOPHEROL) 100 UNIT capsule          Allergies   Allergen Reactions     Codeine Nausea and Vomiting     Diazepam      Other reaction(s): Hallucinations     Naproxen Other (See Comments)     Mouth sores     Sulfa Drugs Unknown     Chlorhexidine Rash     Sulfacetamide Rash     Had a reaction to sulfa drug but is not sure which one it was-states the reaction resulted in redness        Social History     Tobacco Use     Smoking status: Passive Smoke Exposure - Never Smoker     Smokeless tobacco: Never Used     Tobacco comment: Quit smoking:  was a smoker   Substance Use Topics     Alcohol use: No     Alcohol/week: 0.0 standard drinks     Family History   Problem Relation Age of Onset     Diabetes Father         Diabetes     Hypertension Father         Hypertension     Heart Disease Father         Heart Disease     Other - See Comments Father          hearing loss, kidney problems.     Heart Disease Mother         Heart Disease     Hypertension Mother         Hypertension     Cancer Maternal Grandmother         Cancer,bone cancer in her leg     Other - See Comments Maternal Grandmother         Psychiatric illness,depression     Heart Disease Son         Heart Disease,MI - first at age 48     Breast Cancer Sister         Cancer-breast     Diabetes Sister   "       Diabetes     Heart Disease Sister         Heart Disease,angioplasty CAD     Other - See Comments Sister         Right ear deafness - surgically repaired     Other - See Comments Sister         depression     Diabetes Sister         Diabetes     Hypertension Sister         Hypertension     Other - See Comments Sister         car accident age 75 with multiple fractures - in nursing home.     Heart Disease Brother         Heart Disease     Heart Disease Brother         Heart Disease     Hypertension Brother         Hypertension     Other - See Comments Brother         vertigo, also kidney problems.     History   Drug Use No            Objective   /84 (BP Location: Right arm, Patient Position: Sitting, Cuff Size: Adult Regular)   Pulse 75   Temp 98.3  F (36.8  C) (Tympanic)   Resp 16   Ht 1.473 m (4' 10\")   Wt 68.1 kg (150 lb 2 oz)   LMP  (LMP Unknown)   SpO2 98%   Breastfeeding No   BMI 31.38 kg/m    Physical Exam    GENERAL APPEARANCE: healthy, alert and no distress     EYES: EOMI, PERRL     HENT: ear canals and TM's normal and nose and mouth without ulcers or lesions     NECK: no adenopathy, no asymmetry, masses, or scars and thyroid normal to palpation     RESP: lungs clear to auscultation - no rales, rhonchi or wheezes     CV: regular rates and rhythm, normal S1 S2, no S3 or S4 and no murmur, click or rub     ABDOMEN:  soft, nontender, no HSM or masses and bowel sounds normal     MS: extremities normal- no gross deformities noted, no evidence of inflammation in joints, FROM in all extremities.     SKIN: no suspicious lesions or rashes     NEURO: Normal strength and tone, sensory exam grossly normal, mentation intact and speech normal     PSYCH: mentation appears normal. and affect normal/bright     LYMPHATICS: No cervical adenopathy    Recent Labs   Lab Test 09/03/20  0952 04/18/19  1100   HGB  --  13.8   PLT  --  271    140   POTASSIUM 3.6 3.5   CR 0.90 0.92        PRE-OP " Diagnostics:  Recent Results (from the past 24 hour(s))   EKG 12-lead, tracing only (Same Day)    Collection Time: 10/01/20 11:51 AM   Result Value Ref Range    Interpretation ECG Click View Image link to view waveform and result    Basic Metabolic Panel    Collection Time: 10/01/20 11:57 AM   Result Value Ref Range    Sodium 141 134 - 144 mmol/L    Potassium 3.8 3.5 - 5.1 mmol/L    Chloride 102 98 - 107 mmol/L    Carbon Dioxide 30 21 - 31 mmol/L    Anion Gap 9 3 - 14 mmol/L    Glucose 102 70 - 105 mg/dL    Urea Nitrogen 22 7 - 25 mg/dL    Creatinine 0.84 0.60 - 1.20 mg/dL    GFR Estimate 67 >60 mL/min/[1.73_m2]    GFR Estimate If Black 81 >60 mL/min/[1.73_m2]    Calcium 9.8 8.6 - 10.3 mg/dL   CBC and Differential    Collection Time: 10/01/20 11:57 AM   Result Value Ref Range    WBC 6.8 4.0 - 11.0 10e9/L    RBC Count 4.61 3.8 - 5.2 10e12/L    Hemoglobin 13.5 11.7 - 15.7 g/dL    Hematocrit 42.0 35.0 - 47.0 %    MCV 91 78 - 100 fl    MCH 29.3 26.5 - 33.0 pg    MCHC 32.1 31.5 - 36.5 g/dL    RDW 13.3 10.0 - 15.0 %    Platelet Count 257 150 - 450 10e9/L    Diff Method Automated Method     % Neutrophils 54.1 %    % Lymphocytes 30.8 %    % Monocytes 11.2 %    % Eosinophils 3.1 %    % Basophils 0.7 %    % Immature Granulocytes 0.1 %    Absolute Neutrophil 3.7 1.6 - 8.3 10e9/L    Absolute Lymphocytes 2.1 0.8 - 5.3 10e9/L    Absolute Monocytes 0.8 0.0 - 1.3 10e9/L    Absolute Eosinophils 0.2 0.0 - 0.7 10e9/L    Absolute Basophils 0.1 0.0 - 0.2 10e9/L    Abs Immature Granulocytes 0.0 0 - 0.4 10e9/L     EKG today showed normal sinus rhythm with a ventricular rate of 70 beats per minute.  No ST or T wave changes noted.         Assessment & Plan   The proposed surgical procedure is considered INTERMEDIATE risk.    REVISED CARDIAC RISK INDEX  The patient has the following serious cardiovascular risks for perioperative complications:  No serious cardiac risks = 0 points    INTERPRETATION: 1 point: Class II (low risk - 0.9%  complication rate)         ICD-10-CM    1. Preop general physical exam  Z01.818    2. Hypokalemia  E87.6 Basic Metabolic Panel     Basic Metabolic Panel   3. Essential hypertension  I10 EKG 12-lead, tracing only (Same Day)     Basic Metabolic Panel     Basic Metabolic Panel   4. Screening for deficiency anemia  Z13.0 CBC and Differential     CBC and Differential   5. Needs flu shot  Z23 HC FLU VACCINE, INCREASED ANTIGEN, PRESV FREE     1.  She is cleared for surgery as scheduled.    No ibuprofen, aleve or aspirin for at least 7 days prior to surgery.    No vitamins or supplements for at least 7 days prior to surgery.    The morning of surgery, take only hydrochlorothiazide and potassium with a small sip of water.  2.  Basic Metabolic Profile as above with normal potassium noted.  3.  Blood pressure stable.  Labs as above.  4.  Complete Blood Count as above.  5.  Flu shot updated today.    The patient has the following additional risks and recommendations for perioperative complications:     - No identified additional risk factors other than previously addressed       RECOMMENDATION:  APPROVAL GIVEN to proceed with proposed procedure, without further diagnostic evaluation.    No follow-ups on file.    Signed Electronically by: Brooke Castaneda MD    Copy of this evaluation report is provided to requesting physician.    [facility] Pre[facility]    Revised Cardiac Risk Index

## 2020-10-01 NOTE — H&P (VIEW-ONLY)
[facility] AND [facility]  [address]  [place] [address]  Phone: [phone]  Fax: [phone]  Primary Provider: Brooke Hastings  Pre-op Performing Provider: BROOKE HASTINGS    PREOPERATIVE EVALUATION:  Today's date: 10/1/2020    Arlyn Holder is a 70 year old female who presents for a preoperative evaluation.    Surgical Information:  Surgery Details 10/1/2020   Surgery/Procedure: cystocele with rectocele, vaginal prolapse   Surgery Location: [facility]   Surgeon: Dr. Wade Reyna   Surgery Date: 10/19/20   Time of Surgery: TBD   Where patient plans to recover: At home with family     Fax number for surgical facility: Note does not need to be faxed, will be available electronically in Epic.  Type of Anesthesia Anticipated: to be determined    Subjective     HPI related to upcoming procedure: Arlyn has had issues with discomfort related to vaginal prolapse as well as a cystocele and rectocele.  She did have surgery several years ago.  She has had erosion of mesh used for previous surgery through her vaginal wall.    Preop Questions 10/1/2020   1. Have you ever had a heart attack or stroke? No   2. Have you ever had surgery on your heart or blood vessels, such as a stent placement, a coronary artery bypass, or surgery on an artery in your head, neck, heart, or legs? No   3. Do you have chest pain with activity? YES - occasionally.  Not on a regular basis.  Thinks it is more related to gastroesophageal reflux disease.  Some foods seem to precipitate it for her (acidic foods, caffeine, chocolate, mints)   4. Do you have a history of  heart failure? No   5. Do you currently have a cold, bronchitis or symptoms of other infection? No   6. Do you have a cough, shortness of breath, or wheezing? No   7. Do you or anyone in your family have previous history of blood clots? YES - father   8. Do you or does anyone in your family have a serious bleeding problem such as prolonged  bleeding following surgeries or cuts? No   9. Have you ever had problems with anemia or been told to take iron pills? YES - in childbearing years associated with pregnancy   10. Have you had any abnormal blood loss such as black, tarry or bloody stools, or abnormal vaginal bleeding? YES - in January, rectal and vaginal spotting.  Hasn't had this for several month.   11. Have you ever had a blood transfusion? No   Are you willing to have a blood transfusion if it is medically needed before, during, or after your surgery? Yes   13. Have you or any of your relatives ever had problems with anesthesia? YES - sick and weak for a period of time after a prior surgery.  Was told that she had migrainous vertigo related to an anesthetic used.   14. Do you have sleep apnea, excessive snoring or daytime drowsiness? No   15. Do you have any artifical heart valves or other implanted medical devices like a pacemaker, defibrillator, or continuous glucose monitor? No   16. Do you have artificial joints? No   17. Are you allergic to latex? No   18. Is there any chance that you may be pregnant? No     Patient does not have a Health Care Directive or Living Will: Discussed advance care planning with patient; information given to patient to review.      [place] ([place] reviewed:  reviewed- no concerns      Review of Systems  CONSTITUTIONAL: NEGATIVE for fever, chills, change in weight  INTEGUMENTARY/SKIN: NEGATIVE for worrisome rashes, moles or lesions  EYES: NEGATIVE for vision changes or irritation  ENT/MOUTH: NEGATIVE for ear, mouth and throat problems  RESP: NEGATIVE for significant cough or SOB  BREAST: NEGATIVE for masses, tenderness or discharge  CV: NEGATIVE for chest pain, palpitations or peripheral edema  GI: NEGATIVE for nausea, abdominal pain, heartburn, or change in bowel habits  : NEGATIVE for frequency, dysuria, or hematuria  MUSCULOSKELETAL: NEGATIVE for significant arthralgias or myalgia  NEURO:  NEGATIVE for weakness, dizziness or paresthesias  ENDOCRINE: NEGATIVE for temperature intolerance, skin/hair changes  HEME: NEGATIVE for bleeding problems  PSYCHIATRIC: NEGATIVE for changes in mood or affect    Patient Active Problem List    Diagnosis Date Noted     Vaginal vault prolapse, posthysterectomy 09/14/2020     Priority: Medium     Added automatically from request for surgery [id number]       Cystocele with rectocele 09/14/2020     Priority: Medium     Added automatically from request for surgery [id number]       Erosion of implanted vaginal mesh to surrounding tissue, initial encounter (H) 09/14/2020     Priority: Medium     Added automatically from request for surgery [id number]       Enthesopathy of hip region 02/15/2018     Priority: Medium     Degeneration of cervical intervertebral disc 02/15/2018     Priority: Medium     Overview:   Multilevel degenerative disk disease in cervical lumbar spine without   radiculopathy.  Add also possible right cubital tunnel syndrome at elbow.       Chondromalacia of patella 02/15/2018     Priority: Medium     Hypertension 02/15/2018     Priority: Medium     Ascending aorta dilatation (H) 03/23/2016     Priority: Medium     Overview:   3.5-3.6 cm on CT 3/2016 - follow up in 1 year       Arthritis of right wrist 11/07/2013     Priority: Medium     GERD (gastroesophageal reflux disease) 12/03/2012     Priority: Medium     Tendonitis of wrist, right 12/03/2012     Priority: Medium     Hyperlipidemia 10/04/2011     Priority: Medium     Osteoarthritis of ankle or foot 10/04/2011     Priority: Medium     Plantar fasciitis 10/04/2011     Priority: Medium     Carpal tunnel syndrome 11/18/2010     Priority: Medium     Disorder of bursae and tendons in shoulder region 11/18/2010     Priority: Medium     Lesion of lateral popliteal nerve 10/11/2006     Priority: Medium     Lumbosacral spondylosis without myelopathy 10/04/2006     Priority: Medium      Past Medical History:   Diagnosis  Date     AC (acromioclavicular) arthritis 3/1/2018     Bunion of great toe of right foot     No Comments Provided     Carpal tunnel syndrome     2010     Complete tear of right rotator cuff 3/1/2018     Essential (primary) hypertension     No Comments Provided     Gastro-esophageal reflux disease without esophagitis     12/3/2012     Hyperlipidemia     10/4/2011     Impingement syndrome of right shoulder     10/09/07     Personal history of other medical treatment (CODE)      3, Para 3 with three vaginal deliveries     S/P arthroscopy of right shoulder 2018     Thoracic aortic ectasia (H)     3/23/2016,3.5-3.6 cm on CT 3/2016 - follow up in 1 year     Uterovaginal prolapse     preloader comment: selections available to preload differs from paper chart.   *Pelvic floor relaxation     Past Surgical History:   Procedure Laterality Date     ARTHROSCOPY SHOULDER ROTATOR CUFF REPAIR Right 2018    Procedure: ARTHROSCOPY SHOULDER ROTATOR CUFF REPAIR;  Right Shoulder Arthroscopy with Subacromial Decompression, Distal Clavicle Excision, Rotator Cuff Repair, Biceps Tenotomy, Extensive Debridement;  Surgeon: Wilner Painting DO;  Location: [facility] OR     AS SHOULDER ARTHROSCOPY, DX Right 18    Dr. Painting     BUNIONECTOMY      ,right     COLONOSCOPY      , normal     COLONOSCOPY  2015    Normal exam, F/U      HYSTERECTOMY VAGINAL      ,for uterine prolapse with A&P repair.  Tube and ovaries still in place.     LAPAROSCOPY DIAGNOSTIC (GYN)      ,for pelvic adhesion     LAPAROSCOPY DIAGNOSTIC (GYN)      ,vaginal vault prolapse - Dr. Mumtaz Marshall - anterior/posterior repair with bladder mesh.     RELEASE CARPAL TUNNEL      Dr. Méndez     S/P RIGHT SHOULDER ARTHROSCOPY Right 2018     Current Outpatient Medications   Medication Sig Dispense Refill     aspirin (ASPIRIN LOW DOSE) 81 MG tablet Take by mouth daily 30 tablet      atorvastatin (LIPITOR) 20 MG tablet Take 1  tablet (20 mg) by mouth daily 90 tablet 3     Cyanocobalamin (VITAMIN B-12 CR) 1000 MCG TBCR        cyclobenzaprine (FLEXERIL) 10 MG tablet Take 1 tablet (10 mg) by mouth 3 times daily as needed for muscle spasms 30 tablet 11     hydrochlorothiazide (HYDRODIURIL) 25 MG tablet Take 1 tablet (25 mg) by mouth daily 90 tablet 3     lecithin 400 MG CAPS Take 1 capsule by mouth 2 times daily       Multiple Vitamins-Minerals (MENS MULTIVITAMIN PLUS) TABS  30 tablet      potassium chloride ER (KLOR-CON M) 20 MEQ CR tablet Take 1 tablet (20 mEq) by mouth daily 90 tablet 3     triamcinolone (KENALOG) 0.1 % external cream Apply topically 2 times daily 30 g 4     vitamin E (TOCOPHEROL) 100 UNIT capsule          Allergies   Allergen Reactions     Codeine Nausea and Vomiting     Diazepam      Other reaction(s): Hallucinations     Naproxen Other (See Comments)     Mouth sores     Sulfa Drugs Unknown     Chlorhexidine Rash     Sulfacetamide Rash     Had a reaction to sulfa drug but is not sure which one it was-states the reaction resulted in redness        Social History     Tobacco Use     Smoking status: Passive Smoke Exposure - Never Smoker     Smokeless tobacco: Never Used     Tobacco comment: Quit smoking:  was a smoker   Substance Use Topics     Alcohol use: No     Alcohol/week: 0.0 standard drinks     Family History   Problem Relation Age of Onset     Diabetes Father         Diabetes     Hypertension Father         Hypertension     Heart Disease Father         Heart Disease     Other - See Comments Father          hearing loss, kidney problems.     Heart Disease Mother         Heart Disease     Hypertension Mother         Hypertension     Cancer Maternal Grandmother         Cancer,bone cancer in her leg     Other - See Comments Maternal Grandmother         Psychiatric illness,depression     Heart Disease Son         Heart Disease,MI - first at age 48     Breast Cancer Sister         Cancer-breast     Diabetes Sister   "       Diabetes     Heart Disease Sister         Heart Disease,angioplasty CAD     Other - See Comments Sister         Right ear deafness - surgically repaired     Other - See Comments Sister         depression     Diabetes Sister         Diabetes     Hypertension Sister         Hypertension     Other - See Comments Sister         car accident age 75 with multiple fractures - in nursing home.     Heart Disease Brother         Heart Disease     Heart Disease Brother         Heart Disease     Hypertension Brother         Hypertension     Other - See Comments Brother         vertigo, also kidney problems.     History   Drug Use No            Objective   /84 (BP Location: Right arm, Patient Position: Sitting, Cuff Size: Adult Regular)   Pulse 75   Temp 98.3  F (36.8  C) (Tympanic)   Resp 16   Ht 1.473 m (4' 10\")   Wt 68.1 kg (150 lb 2 oz)   LMP  (LMP Unknown)   SpO2 98%   Breastfeeding No   BMI 31.38 kg/m    Physical Exam    GENERAL APPEARANCE: healthy, alert and no distress     EYES: EOMI, PERRL     HENT: ear canals and TM's normal and nose and mouth without ulcers or lesions     NECK: no adenopathy, no asymmetry, masses, or scars and thyroid normal to palpation     RESP: lungs clear to auscultation - no rales, rhonchi or wheezes     CV: regular rates and rhythm, normal S1 S2, no S3 or S4 and no murmur, click or rub     ABDOMEN:  soft, nontender, no HSM or masses and bowel sounds normal     MS: extremities normal- no gross deformities noted, no evidence of inflammation in joints, FROM in all extremities.     SKIN: no suspicious lesions or rashes     NEURO: Normal strength and tone, sensory exam grossly normal, mentation intact and speech normal     PSYCH: mentation appears normal. and affect normal/bright     LYMPHATICS: No cervical adenopathy    Recent Labs   Lab Test 09/03/20  0952 04/18/19  1100   HGB  --  13.8   PLT  --  271    140   POTASSIUM 3.6 3.5   CR 0.90 0.92        PRE-OP " Diagnostics:  Recent Results (from the past 24 hour(s))   EKG 12-lead, tracing only (Same Day)    Collection Time: 10/01/20 11:51 AM   Result Value Ref Range    Interpretation ECG Click View Image link to view waveform and result    Basic Metabolic Panel    Collection Time: 10/01/20 11:57 AM   Result Value Ref Range    Sodium 141 134 - 144 mmol/L    Potassium 3.8 3.5 - 5.1 mmol/L    Chloride 102 98 - 107 mmol/L    Carbon Dioxide 30 21 - 31 mmol/L    Anion Gap 9 3 - 14 mmol/L    Glucose 102 70 - 105 mg/dL    Urea Nitrogen 22 7 - 25 mg/dL    Creatinine 0.84 0.60 - 1.20 mg/dL    GFR Estimate 67 >60 mL/min/[1.73_m2]    GFR Estimate If Black 81 >60 mL/min/[1.73_m2]    Calcium 9.8 8.6 - 10.3 mg/dL   CBC and Differential    Collection Time: 10/01/20 11:57 AM   Result Value Ref Range    WBC 6.8 4.0 - 11.0 10e9/L    RBC Count 4.61 3.8 - 5.2 10e12/L    Hemoglobin 13.5 11.7 - 15.7 g/dL    Hematocrit 42.0 35.0 - 47.0 %    MCV 91 78 - 100 fl    MCH 29.3 26.5 - 33.0 pg    MCHC 32.1 31.5 - 36.5 g/dL    RDW 13.3 10.0 - 15.0 %    Platelet Count 257 150 - 450 10e9/L    Diff Method Automated Method     % Neutrophils 54.1 %    % Lymphocytes 30.8 %    % Monocytes 11.2 %    % Eosinophils 3.1 %    % Basophils 0.7 %    % Immature Granulocytes 0.1 %    Absolute Neutrophil 3.7 1.6 - 8.3 10e9/L    Absolute Lymphocytes 2.1 0.8 - 5.3 10e9/L    Absolute Monocytes 0.8 0.0 - 1.3 10e9/L    Absolute Eosinophils 0.2 0.0 - 0.7 10e9/L    Absolute Basophils 0.1 0.0 - 0.2 10e9/L    Abs Immature Granulocytes 0.0 0 - 0.4 10e9/L     EKG today showed normal sinus rhythm with a ventricular rate of 70 beats per minute.  No ST or T wave changes noted.         Assessment & Plan   The proposed surgical procedure is considered INTERMEDIATE risk.    REVISED CARDIAC RISK INDEX  The patient has the following serious cardiovascular risks for perioperative complications:  No serious cardiac risks = 0 points    INTERPRETATION: 1 point: Class II (low risk - 0.9%  complication rate)         ICD-10-CM    1. Preop general physical exam  Z01.818    2. Hypokalemia  E87.6 Basic Metabolic Panel     Basic Metabolic Panel   3. Essential hypertension  I10 EKG 12-lead, tracing only (Same Day)     Basic Metabolic Panel     Basic Metabolic Panel   4. Screening for deficiency anemia  Z13.0 CBC and Differential     CBC and Differential   5. Needs flu shot  Z23 HC FLU VACCINE, INCREASED ANTIGEN, PRESV FREE     1.  She is cleared for surgery as scheduled.    No ibuprofen, aleve or aspirin for at least 7 days prior to surgery.    No vitamins or supplements for at least 7 days prior to surgery.    The morning of surgery, take only hydrochlorothiazide and potassium with a small sip of water.  2.  Basic Metabolic Profile as above with normal potassium noted.  3.  Blood pressure stable.  Labs as above.  4.  Complete Blood Count as above.  5.  Flu shot updated today.    The patient has the following additional risks and recommendations for perioperative complications:     - No identified additional risk factors other than previously addressed       RECOMMENDATION:  APPROVAL GIVEN to proceed with proposed procedure, without further diagnostic evaluation.    No follow-ups on file.    Signed Electronically by: Brooke Castaneda MD    Copy of this evaluation report is provided to requesting physician.    [facility]    Revised Cardiac Risk Index

## 2020-10-01 NOTE — NURSING NOTE
Patient presents to clinic for pre op exam.  Surgery with Dr. Wade Reyna at [facility] on 10/19/20 for vaginal prolapse and cystocele with rectocele.  Medication Reconciliation: complete    Meagan Morales, CATHRYNN

## 2020-10-01 NOTE — LETTER
Arlyn Holder  [place]   [facility] [place] [address]    10/1/2020      Dear Ms. Holder,      I wanted to let you know about your recent results.  Your result are normal. Please contact us at [phone] with any questions or concerns that you have.    I attached your lab results for your records.        Sincerely,         Brooke Castaneda MD     Resulted Orders   Basic Metabolic Panel   Result Value Ref Range    Sodium 141 134 - 144 mmol/L    Potassium 3.8 3.5 - 5.1 mmol/L    Chloride 102 98 - 107 mmol/L    Carbon Dioxide 30 21 - 31 mmol/L    Anion Gap 9 3 - 14 mmol/L    Glucose 102 70 - 105 mg/dL    Urea Nitrogen 22 7 - 25 mg/dL    Creatinine 0.84 0.60 - 1.20 mg/dL    GFR Estimate 67 >60 mL/min/[1.73_m2]    GFR Estimate If Black 81 >60 mL/min/[1.73_m2]    Calcium 9.8 8.6 - 10.3 mg/dL   CBC and Differential   Result Value Ref Range    WBC 6.8 4.0 - 11.0 10e9/L    RBC Count 4.61 3.8 - 5.2 10e12/L    Hemoglobin 13.5 11.7 - 15.7 g/dL    Hematocrit 42.0 35.0 - 47.0 %    MCV 91 78 - 100 fl    MCH 29.3 26.5 - 33.0 pg    MCHC 32.1 31.5 - 36.5 g/dL    RDW 13.3 10.0 - 15.0 %    Platelet Count 257 150 - 450 10e9/L    Diff Method Automated Method     % Neutrophils 54.1 %    % Lymphocytes 30.8 %    % Monocytes 11.2 %    % Eosinophils 3.1 %    % Basophils 0.7 %    % Immature Granulocytes 0.1 %    Absolute Neutrophil 3.7 1.6 - 8.3 10e9/L    Absolute Lymphocytes 2.1 0.8 - 5.3 10e9/L    Absolute Monocytes 0.8 0.0 - 1.3 10e9/L    Absolute Eosinophils 0.2 0.0 - 0.7 10e9/L    Absolute Basophils 0.1 0.0 - 0.2 10e9/L    Abs Immature Granulocytes 0.0 0 - 0.4 10e9/L

## 2020-10-01 NOTE — PATIENT INSTRUCTIONS

## 2020-10-01 NOTE — PROGRESS NOTES
[facility]  [address]  [place] [address]  Phone: [phone]  Fax: [phone]  Primary Provider: Brooke Castaneda  {FV AMB Performing Provider (Optional):718264}    PREOPERATIVE EVALUATION:  Today's date: 10/1/2020    Arlyn Holder is a 70 year old female who presents for a preoperative evaluation.    Surgical Information:  No flowsheet data found.  Fax number for surgical facility: {SURGERY FAX NUMBER:[id number]}  Type of Anesthesia Anticipated: {ANESTHESIA:803343}    Subjective     HPI related to upcoming procedure: ***  No flowsheet data found.  Patient does not have a Health Care Directive or Living Will: {ADVANCE_DIRECTIVE_STATUS:082120}    RX monitoring program (MNPMP) reviewed: {Mnpmpreport:275313}  {Review MNPMP for all patients per ICSI [email or web address]}  {Chronic problem details (Optional):687545}    Review of Systems  {ROS Preop Choices:216488}    Patient Active Problem List    Diagnosis Date Noted     Vaginal vault prolapse, posthysterectomy 09/14/2020     Priority: Medium     Added automatically from request for surgery [id number]       Cystocele with rectocele 09/14/2020     Priority: Medium     Added automatically from request for surgery [id number]       Erosion of implanted vaginal mesh to surrounding tissue, initial encounter (H) 09/14/2020     Priority: Medium     Added automatically from request for surgery [id number]       Enthesopathy of hip region 02/15/2018     Priority: Medium     Degeneration of cervical intervertebral disc 02/15/2018     Priority: Medium     Overview:   Multilevel degenerative disk disease in cervical lumbar spine without   radiculopathy.  Add also possible right cubital tunnel syndrome at elbow.       Chondromalacia of patella 02/15/2018     Priority: Medium     Hypertension 02/15/2018     Priority: Medium     Ascending aorta dilatation (H) 03/23/2016     Priority: Medium     Overview:   3.5-3.6 cm on CT  3/2016 - follow up in 1 year       Arthritis of right wrist 2013     Priority: Medium     GERD (gastroesophageal reflux disease) 2012     Priority: Medium     Tendonitis of wrist, right 2012     Priority: Medium     Hyperlipidemia 10/04/2011     Priority: Medium     Osteoarthritis of ankle or foot 10/04/2011     Priority: Medium     Plantar fasciitis 10/04/2011     Priority: Medium     Carpal tunnel syndrome 2010     Priority: Medium     Disorder of bursae and tendons in shoulder region 2010     Priority: Medium     Lesion of lateral popliteal nerve 10/11/2006     Priority: Medium     Lumbosacral spondylosis without myelopathy 10/04/2006     Priority: Medium      Past Medical History:   Diagnosis Date     AC (acromioclavicular) arthritis 3/1/2018     Bunion of great toe of right foot     No Comments Provided     Carpal tunnel syndrome     2010     Complete tear of right rotator cuff 3/1/2018     Essential (primary) hypertension     No Comments Provided     Gastro-esophageal reflux disease without esophagitis     12/3/2012     Hyperlipidemia     10/4/2011     Impingement syndrome of right shoulder     10/09/07     Personal history of other medical treatment (CODE)      3, Para 3 with three vaginal deliveries     S/P arthroscopy of right shoulder 2018     Thoracic aortic ectasia (H)     3/23/2016,3.5-3.6 cm on CT 3/2016 - follow up in 1 year     Uterovaginal prolapse     preloader comment: selections available to preload differs from paper chart.   *Pelvic floor relaxation     Past Surgical History:   Procedure Laterality Date     ARTHROSCOPY SHOULDER ROTATOR CUFF REPAIR Right 2018    Procedure: ARTHROSCOPY SHOULDER ROTATOR CUFF REPAIR;  Right Shoulder Arthroscopy with Subacromial Decompression, Distal Clavicle Excision, Rotator Cuff Repair, Biceps Tenotomy, Extensive Debridement;  Surgeon: Wilner Painting DO;  Location: [facility] OR     AS SHOULDER ARTHROSCOPY, DX Right  4/11/18    Dr. Painting     BUNIONECTOMY      1999,right     COLONOSCOPY      2004, normal     COLONOSCOPY  03/16/2015    Normal exam, F/U 2025     HYSTERECTOMY VAGINAL      1982,for uterine prolapse with A&P repair.  Tube and ovaries still in place.     LAPAROSCOPY DIAGNOSTIC (GYN)      1989,for pelvic adhesion     LAPAROSCOPY DIAGNOSTIC (GYN)      11/08,vaginal vault prolapse - Dr. Mumtaz Marshall - anterior/posterior repair with bladder mesh.     RELEASE CARPAL TUNNEL      Dr. Méndez     S/P RIGHT SHOULDER ARTHROSCOPY Right 04/11/2018     Current Outpatient Medications   Medication Sig Dispense Refill     aspirin (ASPIRIN LOW DOSE) 81 MG tablet Take by mouth daily 30 tablet      atorvastatin (LIPITOR) 20 MG tablet Take 1 tablet (20 mg) by mouth daily 90 tablet 3     Cyanocobalamin (VITAMIN B-12 CR) 1000 MCG TBCR        cyclobenzaprine (FLEXERIL) 10 MG tablet Take 1 tablet (10 mg) by mouth 3 times daily as needed for muscle spasms 30 tablet 11     hydrochlorothiazide (HYDRODIURIL) 25 MG tablet Take 1 tablet (25 mg) by mouth daily 90 tablet 3     lecithin 400 MG CAPS Take 1 capsule by mouth 2 times daily       Multiple Vitamins-Minerals (MENS MULTIVITAMIN PLUS) TABS  30 tablet      potassium chloride ER (KLOR-CON M) 20 MEQ CR tablet Take 1 tablet (20 mEq) by mouth daily 90 tablet 3     triamcinolone (KENALOG) 0.1 % external cream Apply topically 2 times daily 30 g 4     vitamin E (TOCOPHEROL) 100 UNIT capsule          Allergies   Allergen Reactions     Codeine Nausea and Vomiting     Diazepam      Other reaction(s): Hallucinations     Naproxen Other (See Comments)     Mouth sores     Sulfa Drugs Unknown     Chlorhexidine Rash     Sulfacetamide Rash     Had a reaction to sulfa drug but is not sure which one it was-states the reaction resulted in redness        Social History     Tobacco Use     Smoking status: Passive Smoke Exposure - Never Smoker     Smokeless tobacco: Never Used     Tobacco comment: Quit smoking:  " was a smoker   Substance Use Topics     Alcohol use: No     Alcohol/week: 0.0 standard drinks     {FAMILY HISTORY (Optional):395124664}  History   Drug Use No            Objective   /84 (BP Location: Right arm, Patient Position: Sitting, Cuff Size: Adult Regular)   Pulse 75   Temp 98.3  F (36.8  C) (Tympanic)   Resp 16   Ht 1.473 m (4' 10\")   Wt 68.1 kg (150 lb 2 oz)   LMP  (LMP Unknown)   SpO2 98%   Breastfeeding No   BMI 31.38 kg/m    Physical Exam  {EXAM Preop Choices:385408}    Recent Labs   Lab Test 20  0952 19  1100   HGB  --  13.8   PLT  --  271    140   POTASSIUM 3.6 3.5   CR 0.90 0.92        PRE-OP Diagnostics:  {LABS:227821}  {EK}    {Provider  Link to PREOP [facility] :900563}     Assessment & Plan   The proposed surgical procedure is considered {HIGH=major cardiovascular or procedures requiring prolonged anesthesia >4 hours or large fluid shifts;    INTERMEDIATE=abdominal, most orthopedic and intrathoracic surgery; LOW= endoscopy, cataract and breast surgery:155263} risk.    REVISED CARDIAC RISK INDEX  The patient has the following serious cardiovascular risks for perioperative complications:  {PREOP REVISED CARDIAC RISK INDEX (RCRI):297424::\"No serious cardiac risks = 0 points\"}    INTERPRETATION: {REVISED CARDIAC RISK INTERPRETATION:184265}    {Diag Picklist :224251}    The patient has the following additional risks and recommendations for perioperative complications:    {IMPORTANT - Conditions - complete carefully!!:096986}     MEDICATION INSTRUCTIONS:  {IMPORTANT - Medications:833281}    RECOMMENDATION:  {IMPORTANT - Approval:533884::\"APPROVAL GIVEN to proceed with proposed procedure, without further diagnostic evaluation.\"}    No follow-ups on file.    Signed Electronically by: Brooke Castaneda MD    Copy of this evaluation report is provided to requesting physician.    [facility] Preop Guidelines    Revised Cardiac Risk " Index

## 2020-10-15 ENCOUNTER — ALLIED HEALTH/NURSE VISIT (OUTPATIENT)
Dept: FAMILY MEDICINE | Facility: OTHER | Age: 70
End: 2020-10-15
Attending: OBSTETRICS & GYNECOLOGY
Payer: MEDICARE

## 2020-10-15 DIAGNOSIS — Z20.822 COVID-19 RULED OUT: Primary | ICD-10-CM

## 2020-10-15 PROCEDURE — U0003 INFECTIOUS AGENT DETECTION BY NUCLEIC ACID (DNA OR RNA); SEVERE ACUTE RESPIRATORY SYNDROME CORONAVIRUS 2 (SARS-COV-2) (CORONAVIRUS DISEASE [COVID-19]), AMPLIFIED PROBE TECHNIQUE, MAKING USE OF HIGH THROUGHPUT TECHNOLOGIES AS DESCRIBED BY CMS-2020-01-R: HCPCS | Mod: ZL | Performed by: OBSTETRICS & GYNECOLOGY

## 2020-10-15 PROCEDURE — C9803 HOPD COVID-19 SPEC COLLECT: HCPCS

## 2020-10-15 PROCEDURE — 99207 PR NO CHARGE NURSE ONLY: CPT

## 2020-10-16 ENCOUNTER — ANESTHESIA EVENT (OUTPATIENT)
Dept: SURGERY | Facility: OTHER | Age: 70
End: 2020-10-16
Payer: MEDICARE

## 2020-10-16 LAB
SARS-COV-2 RNA SPEC QL NAA+PROBE: NOT DETECTED
SPECIMEN SOURCE: NORMAL

## 2020-10-16 RX ORDER — FENTANYL CITRATE 50 UG/ML
25-50 INJECTION, SOLUTION INTRAMUSCULAR; INTRAVENOUS
Status: CANCELLED | OUTPATIENT
Start: 2020-10-16

## 2020-10-19 ENCOUNTER — ANESTHESIA (OUTPATIENT)
Dept: SURGERY | Facility: OTHER | Age: 70
End: 2020-10-19
Payer: MEDICARE

## 2020-10-19 ENCOUNTER — HOSPITAL ENCOUNTER (OUTPATIENT)
Facility: OTHER | Age: 70
Discharge: HOME | End: 2020-10-20
Attending: OBSTETRICS & GYNECOLOGY | Admitting: OBSTETRICS & GYNECOLOGY
Payer: MEDICARE

## 2020-10-19 DIAGNOSIS — N81.6 CYSTOCELE WITH RECTOCELE: ICD-10-CM

## 2020-10-19 DIAGNOSIS — N81.10 CYSTOCELE WITH RECTOCELE: ICD-10-CM

## 2020-10-19 DIAGNOSIS — T83.711A EROSION OF IMPLANTED VAGINAL MESH TO SURROUNDING TISSUE, INITIAL ENCOUNTER (H): ICD-10-CM

## 2020-10-19 DIAGNOSIS — N99.3 VAGINAL VAULT PROLAPSE, POSTHYSTERECTOMY: ICD-10-CM

## 2020-10-19 LAB — HGB BLD-MCNC: 13.4 G/DL (ref 11.7–15.7)

## 2020-10-19 PROCEDURE — 250N000009 HC RX 250: Performed by: OBSTETRICS & GYNECOLOGY

## 2020-10-19 PROCEDURE — 258N000001 HC RX 258: Performed by: OBSTETRICS & GYNECOLOGY

## 2020-10-19 PROCEDURE — 258N000003 HC RX IP 258 OP 636: Performed by: NURSE ANESTHETIST, CERTIFIED REGISTERED

## 2020-10-19 PROCEDURE — 370N000002 HC ANESTHESIA TECHNICAL FEE, EACH ADDTL 15 MIN: Performed by: OBSTETRICS & GYNECOLOGY

## 2020-10-19 PROCEDURE — 360N000021 HC SURGERY LEVEL 3 EA 15 ADDTL MIN: Performed by: OBSTETRICS & GYNECOLOGY

## 2020-10-19 PROCEDURE — 272N000001 HC OR GENERAL SUPPLY STERILE: Performed by: OBSTETRICS & GYNECOLOGY

## 2020-10-19 PROCEDURE — 250N000011 HC RX IP 250 OP 636: Performed by: NURSE ANESTHETIST, CERTIFIED REGISTERED

## 2020-10-19 PROCEDURE — 99100 ANES PT EXTEME AGE<1 YR&>70: CPT | Performed by: NURSE ANESTHETIST, CERTIFIED REGISTERED

## 2020-10-19 PROCEDURE — 370N000001 HC ANESTHESIA TECHNICAL FEE, 1ST 30 MIN: Performed by: OBSTETRICS & GYNECOLOGY

## 2020-10-19 PROCEDURE — 57265 CMBN AP COLPRHY W/NTRCL RPR: CPT | Performed by: OBSTETRICS & GYNECOLOGY

## 2020-10-19 PROCEDURE — 250N000013 HC RX MED GY IP 250 OP 250 PS 637: Mod: GY | Performed by: OBSTETRICS & GYNECOLOGY

## 2020-10-19 PROCEDURE — 250N000009 HC RX 250: Performed by: NURSE ANESTHETIST, CERTIFIED REGISTERED

## 2020-10-19 PROCEDURE — 999N000136 HC STATISTIC PRE PROC ASSESS II: Performed by: OBSTETRICS & GYNECOLOGY

## 2020-10-19 PROCEDURE — 250N000011 HC RX IP 250 OP 636: Performed by: OBSTETRICS & GYNECOLOGY

## 2020-10-19 PROCEDURE — 88304 TISSUE EXAM BY PATHOLOGIST: CPT

## 2020-10-19 PROCEDURE — 85018 HEMOGLOBIN: CPT | Performed by: OBSTETRICS & GYNECOLOGY

## 2020-10-19 PROCEDURE — 57265 CMBN AP COLPRHY W/NTRCL RPR: CPT | Performed by: NURSE ANESTHETIST, CERTIFIED REGISTERED

## 2020-10-19 PROCEDURE — 57120 COLPOCLEISIS LE FORT TYPE: CPT | Performed by: OBSTETRICS & GYNECOLOGY

## 2020-10-19 PROCEDURE — 57295 REVISE VAG GRAFT VIA VAGINA: CPT | Performed by: OBSTETRICS & GYNECOLOGY

## 2020-10-19 PROCEDURE — 36415 COLL VENOUS BLD VENIPUNCTURE: CPT | Performed by: OBSTETRICS & GYNECOLOGY

## 2020-10-19 PROCEDURE — 360N000020 HC SURGERY LEVEL 3 1ST 30 MIN: Performed by: OBSTETRICS & GYNECOLOGY

## 2020-10-19 PROCEDURE — 761N000005 HC RECOVERY PHASE 1 LEVEL 3 FIRST HR: Performed by: OBSTETRICS & GYNECOLOGY

## 2020-10-19 RX ORDER — SODIUM CHLORIDE, SODIUM LACTATE, POTASSIUM CHLORIDE, CALCIUM CHLORIDE 600; 310; 30; 20 MG/100ML; MG/100ML; MG/100ML; MG/100ML
INJECTION, SOLUTION INTRAVENOUS CONTINUOUS
Status: DISCONTINUED | OUTPATIENT
Start: 2020-10-19 | End: 2020-10-19 | Stop reason: HOSPADM

## 2020-10-19 RX ORDER — LIDOCAINE HYDROCHLORIDE 20 MG/ML
INJECTION, SOLUTION INFILTRATION; PERINEURAL PRN
Status: DISCONTINUED | OUTPATIENT
Start: 2020-10-19 | End: 2020-10-19

## 2020-10-19 RX ORDER — MAGNESIUM HYDROXIDE 1200 MG/15ML
LIQUID ORAL PRN
Status: DISCONTINUED | OUTPATIENT
Start: 2020-10-19 | End: 2020-10-19 | Stop reason: HOSPADM

## 2020-10-19 RX ORDER — KETAMINE HYDROCHLORIDE 10 MG/ML
INJECTION INTRAMUSCULAR; INTRAVENOUS PRN
Status: DISCONTINUED | OUTPATIENT
Start: 2020-10-19 | End: 2020-10-19

## 2020-10-19 RX ORDER — SODIUM CHLORIDE, SODIUM LACTATE, POTASSIUM CHLORIDE, CALCIUM CHLORIDE 600; 310; 30; 20 MG/100ML; MG/100ML; MG/100ML; MG/100ML
INJECTION, SOLUTION INTRAVENOUS CONTINUOUS
Status: DISCONTINUED | OUTPATIENT
Start: 2020-10-19 | End: 2020-10-20 | Stop reason: HOSPADM

## 2020-10-19 RX ORDER — PROPOFOL 10 MG/ML
INJECTION, EMULSION INTRAVENOUS CONTINUOUS PRN
Status: DISCONTINUED | OUTPATIENT
Start: 2020-10-19 | End: 2020-10-19

## 2020-10-19 RX ORDER — KETOROLAC TROMETHAMINE 30 MG/ML
INJECTION, SOLUTION INTRAMUSCULAR; INTRAVENOUS PRN
Status: DISCONTINUED | OUTPATIENT
Start: 2020-10-19 | End: 2020-10-19

## 2020-10-19 RX ORDER — NALOXONE HYDROCHLORIDE 0.4 MG/ML
.1-.4 INJECTION, SOLUTION INTRAMUSCULAR; INTRAVENOUS; SUBCUTANEOUS
Status: DISCONTINUED | OUTPATIENT
Start: 2020-10-19 | End: 2020-10-19 | Stop reason: HOSPADM

## 2020-10-19 RX ORDER — ONDANSETRON 4 MG/1
4 TABLET, ORALLY DISINTEGRATING ORAL EVERY 30 MIN PRN
Status: DISCONTINUED | OUTPATIENT
Start: 2020-10-19 | End: 2020-10-19 | Stop reason: HOSPADM

## 2020-10-19 RX ORDER — LIDOCAINE 40 MG/G
CREAM TOPICAL
Status: DISCONTINUED | OUTPATIENT
Start: 2020-10-19 | End: 2020-10-20 | Stop reason: HOSPADM

## 2020-10-19 RX ORDER — LIDOCAINE 40 MG/G
CREAM TOPICAL
Status: DISCONTINUED | OUTPATIENT
Start: 2020-10-19 | End: 2020-10-19 | Stop reason: HOSPADM

## 2020-10-19 RX ORDER — ASPIRIN 81 MG/1
81 TABLET, CHEWABLE ORAL DAILY
Status: DISCONTINUED | OUTPATIENT
Start: 2020-10-20 | End: 2020-10-19

## 2020-10-19 RX ORDER — ACETAMINOPHEN 325 MG/1
650 TABLET ORAL EVERY 6 HOURS PRN
Status: DISCONTINUED | OUTPATIENT
Start: 2020-10-19 | End: 2020-10-20 | Stop reason: HOSPADM

## 2020-10-19 RX ORDER — NALOXONE HYDROCHLORIDE 0.4 MG/ML
.1-.4 INJECTION, SOLUTION INTRAMUSCULAR; INTRAVENOUS; SUBCUTANEOUS
Status: DISCONTINUED | OUTPATIENT
Start: 2020-10-19 | End: 2020-10-20 | Stop reason: HOSPADM

## 2020-10-19 RX ORDER — CEFAZOLIN SODIUM 1 G/50ML
1 INJECTION, SOLUTION INTRAVENOUS SEE ADMIN INSTRUCTIONS
Status: DISCONTINUED | OUTPATIENT
Start: 2020-10-19 | End: 2020-10-19 | Stop reason: HOSPADM

## 2020-10-19 RX ORDER — MEPERIDINE HYDROCHLORIDE 50 MG/ML
12.5 INJECTION INTRAMUSCULAR; INTRAVENOUS; SUBCUTANEOUS
Status: DISCONTINUED | OUTPATIENT
Start: 2020-10-19 | End: 2020-10-19 | Stop reason: HOSPADM

## 2020-10-19 RX ORDER — LIDOCAINE HYDROCHLORIDE AND EPINEPHRINE 10; 10 MG/ML; UG/ML
INJECTION, SOLUTION INFILTRATION; PERINEURAL PRN
Status: DISCONTINUED | OUTPATIENT
Start: 2020-10-19 | End: 2020-10-19 | Stop reason: HOSPADM

## 2020-10-19 RX ORDER — DEXAMETHASONE SODIUM PHOSPHATE 4 MG/ML
INJECTION, SOLUTION INTRA-ARTICULAR; INTRALESIONAL; INTRAMUSCULAR; INTRAVENOUS; SOFT TISSUE PRN
Status: DISCONTINUED | OUTPATIENT
Start: 2020-10-19 | End: 2020-10-19

## 2020-10-19 RX ORDER — LIDOCAINE HYDROCHLORIDE 10 MG/ML
INJECTION, SOLUTION INFILTRATION; PERINEURAL PRN
Status: DISCONTINUED | OUTPATIENT
Start: 2020-10-19 | End: 2020-10-19

## 2020-10-19 RX ORDER — CEFAZOLIN SODIUM 2 G/100ML
2 INJECTION, SOLUTION INTRAVENOUS
Status: COMPLETED | OUTPATIENT
Start: 2020-10-19 | End: 2020-10-19

## 2020-10-19 RX ORDER — BUPIVACAINE HYDROCHLORIDE 7.5 MG/ML
INJECTION, SOLUTION INTRASPINAL PRN
Status: DISCONTINUED | OUTPATIENT
Start: 2020-10-19 | End: 2020-10-19

## 2020-10-19 RX ORDER — FENTANYL CITRATE 50 UG/ML
25-50 INJECTION, SOLUTION INTRAMUSCULAR; INTRAVENOUS
Status: DISCONTINUED | OUTPATIENT
Start: 2020-10-19 | End: 2020-10-19 | Stop reason: HOSPADM

## 2020-10-19 RX ORDER — ATORVASTATIN CALCIUM 20 MG/1
20 TABLET, FILM COATED ORAL DAILY
Status: DISCONTINUED | OUTPATIENT
Start: 2020-10-19 | End: 2020-10-20 | Stop reason: HOSPADM

## 2020-10-19 RX ORDER — ONDANSETRON 2 MG/ML
INJECTION INTRAMUSCULAR; INTRAVENOUS PRN
Status: DISCONTINUED | OUTPATIENT
Start: 2020-10-19 | End: 2020-10-19

## 2020-10-19 RX ORDER — HYDROCHLOROTHIAZIDE 25 MG/1
25 TABLET ORAL DAILY
Status: DISCONTINUED | OUTPATIENT
Start: 2020-10-19 | End: 2020-10-20 | Stop reason: HOSPADM

## 2020-10-19 RX ORDER — ONDANSETRON 2 MG/ML
4 INJECTION INTRAMUSCULAR; INTRAVENOUS EVERY 6 HOURS PRN
Status: DISCONTINUED | OUTPATIENT
Start: 2020-10-19 | End: 2020-10-20 | Stop reason: HOSPADM

## 2020-10-19 RX ORDER — PHENAZOPYRIDINE HYDROCHLORIDE 100 MG/1
200 TABLET, FILM COATED ORAL ONCE
Status: COMPLETED | OUTPATIENT
Start: 2020-10-19 | End: 2020-10-19

## 2020-10-19 RX ORDER — ONDANSETRON 4 MG/1
4 TABLET, ORALLY DISINTEGRATING ORAL EVERY 6 HOURS PRN
Status: DISCONTINUED | OUTPATIENT
Start: 2020-10-19 | End: 2020-10-20 | Stop reason: HOSPADM

## 2020-10-19 RX ORDER — ONDANSETRON 2 MG/ML
4 INJECTION INTRAMUSCULAR; INTRAVENOUS EVERY 30 MIN PRN
Status: DISCONTINUED | OUTPATIENT
Start: 2020-10-19 | End: 2020-10-19 | Stop reason: HOSPADM

## 2020-10-19 RX ORDER — HYDROCODONE BITARTRATE AND ACETAMINOPHEN 5; 325 MG/1; MG/1
1-2 TABLET ORAL EVERY 4 HOURS PRN
Status: DISCONTINUED | OUTPATIENT
Start: 2020-10-19 | End: 2020-10-20 | Stop reason: HOSPADM

## 2020-10-19 RX ADMIN — LIDOCAINE HYDROCHLORIDE 400 MG: 20 INJECTION, SOLUTION INFILTRATION; PERINEURAL at 12:22

## 2020-10-19 RX ADMIN — SODIUM CHLORIDE, POTASSIUM CHLORIDE, SODIUM LACTATE AND CALCIUM CHLORIDE: 600; 310; 30; 20 INJECTION, SOLUTION INTRAVENOUS at 14:27

## 2020-10-19 RX ADMIN — LIDOCAINE HYDROCHLORIDE 1 ML: 10 INJECTION, SOLUTION INFILTRATION; PERINEURAL at 12:16

## 2020-10-19 RX ADMIN — BUPIVACAINE HYDROCHLORIDE IN DEXTROSE 1.8 ML: 7.5 INJECTION, SOLUTION SUBARACHNOID at 12:21

## 2020-10-19 RX ADMIN — ACETAMINOPHEN 650 MG: 325 TABLET, FILM COATED ORAL at 22:39

## 2020-10-19 RX ADMIN — MIDAZOLAM 2 MG: 1 INJECTION INTRAMUSCULAR; INTRAVENOUS at 12:10

## 2020-10-19 RX ADMIN — Medication 10 MG: at 12:30

## 2020-10-19 RX ADMIN — ONDANSETRON 4 MG: 2 INJECTION INTRAMUSCULAR; INTRAVENOUS at 12:15

## 2020-10-19 RX ADMIN — HYDROCHLOROTHIAZIDE 25 MG: 25 TABLET ORAL at 16:33

## 2020-10-19 RX ADMIN — KETOROLAC TROMETHAMINE 30 MG: 30 INJECTION, SOLUTION INTRAMUSCULAR at 14:17

## 2020-10-19 RX ADMIN — FENTANYL CITRATE 25 MCG: 50 INJECTION, SOLUTION INTRAMUSCULAR; INTRAVENOUS at 15:02

## 2020-10-19 RX ADMIN — ATORVASTATIN CALCIUM 20 MG: 20 TABLET ORAL at 16:33

## 2020-10-19 RX ADMIN — SODIUM CHLORIDE, POTASSIUM CHLORIDE, SODIUM LACTATE AND CALCIUM CHLORIDE 10 ML/HR: 600; 310; 30; 20 INJECTION, SOLUTION INTRAVENOUS at 10:33

## 2020-10-19 RX ADMIN — PROPOFOL 50 MCG/KG/MIN: 10 INJECTION, EMULSION INTRAVENOUS at 12:22

## 2020-10-19 RX ADMIN — PHENAZOPYRIDINE HYDROCHLORIDE 200 MG: 100 TABLET ORAL at 10:19

## 2020-10-19 RX ADMIN — CEFAZOLIN SODIUM 2 G: 2 INJECTION, SOLUTION INTRAVENOUS at 12:06

## 2020-10-19 RX ADMIN — ACETAMINOPHEN 650 MG: 325 TABLET, FILM COATED ORAL at 17:31

## 2020-10-19 RX ADMIN — DEXAMETHASONE SODIUM PHOSPHATE 4 MG: 4 INJECTION, SOLUTION INTRA-ARTICULAR; INTRALESIONAL; INTRAMUSCULAR; INTRAVENOUS; SOFT TISSUE at 12:15

## 2020-10-19 SDOH — HEALTH STABILITY: MENTAL HEALTH: CURRENT SMOKER: 0

## 2020-10-19 ASSESSMENT — MIFFLIN-ST. JEOR
SCORE: 1077.63
SCORE: 1080.15

## 2020-10-19 ASSESSMENT — LIFESTYLE VARIABLES: TOBACCO_USE: 1

## 2020-10-19 NOTE — BRIEF OP NOTE
[facility]    Brief Operative Note    Pre-operative diagnosis: Vaginal vault prolapse, posthysterectomy [N99.3]  Cystocele with rectocele [N81.10, N81.6]  Erosion of implanted vaginal mesh to surrounding tissue, initial encounter (H) [T83.294T]  Post-operative diagnosis vaginal vault prolapse, eroded mid-urethral sling    Procedure: Procedure(s):  COLPORRHAPHY, COMBINED ANTEROPOSTERIOR, ENTEROCELE REPAIR, COLPOCLIESIS, Repair eroded mesh  CYSTOSCOPY  Surgeon: Surgeon(s) and Role:     * Wade Reyna MD - Primary  Anesthesia: Spinal   Estimated blood loss: Less than 100 ml  Drains: None  Specimens:   ID Type Source Tests Collected by Time Destination   A : ERODED MESH FROM IN VAGINA Tissue Vagina SURGICAL PATHOLOGY EXAM Wade Reyna MD 10/19/2020 12:47 PM      Findings:   None.  Complications: None.  Implants: * No implants in log *

## 2020-10-19 NOTE — PLAN OF CARE
Problem: Adult Inpatient Plan of Care  Goal: Plan of Care Review  Outcome: No Change  Flowsheets (Taken 10/19/2020 1544)  Plan of Care Reviewed With:   patient   daughter  Goal: Patient-Specific Goal (Individualized)  Outcome: No Change  Flowsheets (Taken 10/19/2020 1622)  Individualized Care Needs: none  Goal: Absence of Hospital-Acquired Illness or Injury  Outcome: No Change  Intervention: Identify and Manage Fall Risk  Recent Flowsheet Documentation  Taken 10/19/2020 1544 by Meagan Garcia RN  Safety Promotion/Fall Prevention: safety round/check completed  Intervention: Prevent Skin Injury  Recent Flowsheet Documentation  Taken 10/19/2020 1515 by Meagan Garcia RN  Body Position: supine  Intervention: Prevent and Manage VTE (Venous Thromboembolism) Risk  Recent Flowsheet Documentation  Taken 10/19/2020 1544 by Meagan Garcia RN  VTE Prevention/Management: pneumatic compression device  Goal: Optimal Comfort and Wellbeing  Outcome: No Change  Intervention: Provide Person-Centered Care  Recent Flowsheet Documentation  Taken 10/19/2020 1544 by Meagan Garcia RN  Trust Relationship/Rapport:   care explained   choices provided   emotional support provided   empathic listening provided   questions answered   questions encouraged   reassurance provided   thoughts/feelings acknowledged  Goal: Readiness for Transition of Care  Outcome: No Change  Intervention: Mutually Develop Transition Plan  Recent Flowsheet Documentation  Taken 10/19/2020 1600 by Meagan Garcia RN  Patient/Family Anticipates Transition to: home with family  Equipment Currently Used at Home:   cane, straight   walker, standard     Problem: Bleeding (Pelvic Organ Prolapse Surgical Repair)  Goal: Absence of Bleeding  Outcome: No Change     Problem: Bowel Elimination Impaired (Pelvic Organ Prolapse Surgical Repair)  Goal: Effective Bowel Elimination  Outcome: No Change     Problem: Infection (Pelvic Organ Prolapse Surgical Repair)  Goal:  Absence of Infection Signs and Symptoms  Outcome: No Change     Problem: Ongoing Anesthesia Effects (Pelvic Organ Prolapse Surgical Repair)  Goal: Anesthesia/Sedation Recovery  Outcome: No Change  Intervention: Optimize Anesthesia Recovery  Recent Flowsheet Documentation  Taken 10/19/2020 1544 by Meagan Garcia RN  Safety Promotion/Fall Prevention: safety round/check completed     Problem: Pain (Pelvic Organ Prolapse Surgical Repair)  Goal: Acceptable Pain Control  Outcome: No Change  Intervention: Prevent or Manage Pain  Recent Flowsheet Documentation  Taken 10/19/2020 1515 by Meagan Garcia RN  Pain Management Interventions: cold applied     Problem: Postoperative Nausea and Vomiting (Pelvic Organ Prolapse Surgical Repair)  Goal: Nausea and Vomiting Relief  Outcome: No Change     Problem: Postoperative Urinary Retention (Pelvic Organ Prolapse Surgical Repair)  Goal: Effective Urinary Elimination  Outcome: No Change

## 2020-10-19 NOTE — OP NOTE
Preoperative Diagnosis: Vaginal vault prolapse with cystocele enterocele rectocele, erosion of mid urethral sling  Postoperative Diagnosis: Same  Procedure: Anterior and posterior colporrhaphy with enterocele repair, relative colpocleisis, excision of eroded mesh, cystoscopy (mod 22)  Surgeon: Wade Reyna MD  Assistant Surgeon:     Clinical History: Patient is a 70-year-old healthy female who sees [facility] for her regular care 40+ years ago had a hysterectomy and bladder repair for pelvic organ prolapse.  In 2009 had a repeat anterior and posterior colporrhaphy with a mid urethral sling.  Has eroded a very large area of mesh over 4 cm midline and on the left.  Basically a complete vaginal vault prolapse.  No longer sexually active.    Findings: Complete vaginal vault prolapse as noted.  Posterior sac entered into and otherwise unremarkable.  Bladder normal in appearance at cystoscopy with good urine jets confirmed bilaterally.  No evidence of trauma to bladder or urethra during dissections.  Postrepair had a markedly narrowed vagina with excellent apical anterior and posterior support    Operative Report: Patient was taken to the OR, received spinal anesthesia with sedation, was prepped and draped in candycane stirrups.  SCDs and Moses catheter utilized.  Timeout performed.  Cystoscopy initially performed to make sure there was no erosion of the mesh into the bladder.  This was negative.  The mesh erosion was from about 1 cm to the right of midline and extended 3 to 4 cm on the left.  Unfortunate the mesh was markedly fragmented and variously embedded in the tissue, definitely not in a discrete sling.  We undermined midportion of the eroded sling and carefully dissected in both directions removing all visible and palpable areas of mesh, mobilizing vaginal tissue as needed to distance 6 to 7 mm under the normal vaginal tissue bilaterally.  We reprepped then with Betadine and irrigated extensively.  Cystoscopy was  repeated showing no evidence of trauma to the urethra.  We closed then in 2 layers anterior posteriorly with a deep layer of interrupted 3-0 chromic followed by vaginal mucosal layer of interrupted 2-0 chromic.    We then grasped and made a transverse incision across the inverted vaginal apex and injected in size depth over the midline to almost the devious dissection.  We then meticulously and very carefully dissected the vaginal Koza off the underlying.  Vesicular endopelvic fascia and also extended then posteriorly extensively exposing the enterocele.  Significant scarring was encountered from her previous 2 repairs.  We were able to able to fully mobilize the bladder.  We entered into the enterocele sac and then dissected the enterocele down an additional 5 to 6 cm posteriorly.  After satisfactory dissection of the enterocele sac we closed the enterocele sac with a pursestring of 2-0 chromic followed by a [place] closure of serial 2-0 chromic pursestring sutures.  This resulted in pleat reduction of the enterocele.  We then reduce the cystocele with horizontal plicating mattress sutures of 2-0 Vicryl brought the lateral joint over the reduced enterocele in similar fashion.  A large amount of excess vaginal Koza was excised away and we closed the vagina side to side with figure-of-eight 2-0 Vicryl sutures.  Cystoscopy was repeated.  There was no evidence of trauma to the bladder.  Good urine jets were confirmed bilaterally.  Moses was replaced.    We then made an elliptical incision on the perineum and injected and dissected up over the rectocele in the midline.  Extensive scarring was present.  We dissected the vaginal tissue off the underlying perirectal endopelvic fascia and mobilized the perineum.  We then reduce the rectocele and support of the perineum with serial plicating mattress sutures of 2-0 Vicryl.  Excess vaginal tissue was excised away and we used a running locked 2-0 Vicryl down to the introitus  and a 2 layer closure on the perineum.  This resulted in excellent posterior support to go along with a anterior and apical support.    Patch packing was placed and the procedure was terminated      Patient tolerated the procedure well and was taken to recovery in stable condition:    EBL: < 100 cc    Complications: none apparent

## 2020-10-19 NOTE — ANESTHESIA CARE TRANSFER NOTE
Patient: Arlyn Holder    Procedure(s):  COLPORRHAPHY, COMBINED ANTEROPOSTERIOR, ENTEROCELE REPAIR, COLPOCLIESIS, Repair eroded mesh  CYSTOSCOPY    Diagnosis: Vaginal vault prolapse, posthysterectomy [N99.3]  Cystocele with rectocele [N81.10, N81.6]  Erosion of implanted vaginal mesh to surrounding tissue, initial encounter (H) [T83.021A]  Diagnosis Additional Information: No value filed.    Anesthesia Type:   Spinal     Note:  Airway :Room Air  Patient transferred to:PACU  Handoff Report: Identifed the Patient, Identified the Reponsible Provider, Reviewed the pertinent medical history, Discussed the surgical course, Reviewed Intra-OP anesthesia mangement and issues during anesthesia, Set expectations for post-procedure period and Allowed opportunity for questions and acknowledgement of understanding      Vitals: (Last set prior to Anesthesia Care Transfer)    CRNA VITALS  10/19/2020 1403 - 10/19/2020 1433      10/19/2020             Resp Rate (set):  10                Electronically Signed By: LUX Perkins CRNA  October 19, 2020  2:33 PM

## 2020-10-19 NOTE — OR NURSING
PACU Transfer Note    Arlyn Holder was transferred to [facility] via cart.  Equipment used for transport:  none.  Accompanied by:  RN  Prescriptions were: none    PACU Respiratory Event Documentation     1) Episodes of Apnea greater than or equal to 10 seconds: 0    2) Bradypnea - less than 8 breaths per minute: 0    3) Pain score on 0 to 10 scale: 4    4) Pain-sedation mismatch (yes or no): no    5) Repeated 02 desaturation less than 90% (yes or no): no    Anesthesia notified? (yes or no): no    Any of the above events occuring repeatedly in separate 30 minute intervals may be considered recurrent PACU respiratory events.    Patient stable and meets phase 1 discharge criteria for transport from PACU.

## 2020-10-19 NOTE — INTERVAL H&P NOTE
The History and Physical is reviewed from 10/01/2020.  No changes or additions.  Chest: CTA  CV: RRR  Surgery reviewed in detail.  All questions answered

## 2020-10-19 NOTE — PROGRESS NOTES
Patient arrived to unit via cart from PACU. patient alert x4 with family at bedside. Assessment complete. Patient informs this RN her pain has decreased and is eating crackers and drinking juice.    Vital signs are stable. Family remains at bedside.

## 2020-10-19 NOTE — ANESTHESIA POSTPROCEDURE EVALUATION
Patient: Arlyn Holder    Procedure(s):  COLPORRHAPHY, COMBINED ANTEROPOSTERIOR, ENTEROCELE REPAIR, COLPOCLIESIS, Repair eroded mesh  CYSTOSCOPY    Diagnosis:Vaginal vault prolapse, posthysterectomy [N99.3]  Cystocele with rectocele [N81.10, N81.6]  Erosion of implanted vaginal mesh to surrounding tissue, initial encounter (H) [T83.331K]  Diagnosis Additional Information: No value filed.    Anesthesia Type:  Spinal    Note:  Anesthesia Post Evaluation    Patient location during evaluation: PACU  Patient participation: Able to fully participate in evaluation  Level of consciousness: awake and alert  Pain management: adequate  Airway patency: patent  Cardiovascular status: acceptable  Respiratory status: acceptable  Hydration status: acceptable  PONV: none     Anesthetic complications: None          Last vitals:  Vitals:    10/19/20 1515 10/19/20 1545 10/19/20 1638   BP: 139/84 (!) 151/82 (!) 162/89   Pulse: 76 72 68   Resp: 16 15 18   Temp: 98.1  F (36.7  C) 97.7  F (36.5  C) 97.5  F (36.4  C)   SpO2: 96% 95% 98%         Electronically Signed By: LUX Urbina CRNA  October 19, 2020  5:25 PM

## 2020-10-19 NOTE — PLAN OF CARE
Problem: Adult Inpatient Plan of Care  Goal: Plan of Care Review  Recent Flowsheet Documentation  Taken 10/19/2020 1544 by Meagan Garcia RN  Plan of Care Reviewed With:   patient   daughter  Goal: Patient-Specific Goal (Individualized)  Recent Flowsheet Documentation  Taken 10/19/2020 1622 by Meagan Garcia RN  Individualized Care Needs: none  Goal: Absence of Hospital-Acquired Illness or Injury  Intervention: Identify and Manage Fall Risk  Recent Flowsheet Documentation  Taken 10/19/2020 1544 by Meagan Garcia RN  Safety Promotion/Fall Prevention: safety round/check completed  Intervention: Prevent Skin Injury  Recent Flowsheet Documentation  Taken 10/19/2020 1515 by Meagan Garcia RN  Body Position: supine  Intervention: Prevent and Manage VTE (Venous Thromboembolism) Risk  Recent Flowsheet Documentation  Taken 10/19/2020 1544 by Meagan Garcia RN  VTE Prevention/Management: pneumatic compression device  Goal: Optimal Comfort and Wellbeing  Intervention: Provide Person-Centered Care  Recent Flowsheet Documentation  Taken 10/19/2020 1544 by Meagan Garcia RN  Trust Relationship/Rapport:   care explained   choices provided   emotional support provided   empathic listening provided   questions answered   questions encouraged   reassurance provided   thoughts/feelings acknowledged  Goal: Readiness for Transition of Care  Intervention: Mutually Develop Transition Plan  Recent Flowsheet Documentation  Taken 10/19/2020 1600 by Meagan Garcia RN  Patient/Family Anticipates Transition to: home with family  Equipment Currently Used at Home:   cane, straight   walker, standard

## 2020-10-19 NOTE — ANESTHESIA PROCEDURE NOTES
Procedure note : lumbar puncture      Staff -   CRNA: Oralia Lechuga APRN CRNA  Performed By: CRNA  Pre-Procedure    Location: OR    Procedure Times:10/19/2020 12:15 PM and 10/19/2020 12:21 PM  Pre-Anesthestic Checklist: patient identified, IV checked, site marked, risks and benefits discussed, informed consent, monitors and equipment checked, pre-op evaluation, at physician/surgeon's request and post-op pain management    Timeout  Correct Patient: Yes   Correct Procedure: Yes   Correct Site: Yes   Correct Laterality: Yes and N/A   Correct Position: Yes   Site Marked: N/A   .   Procedure Documentation  ASA 3  .    Procedure: lumbar puncture, .   Patient Position:sitting Insertion Site:L2-3  (midline approach)     Patient Prep/Sterile Barriers; mask, sterile gloves, chlorhexidine gluconate and isopropyl alcohol, patient draped.  .  Needle: # of attempts: 2 and  # of redirects:  1 Introducer used .        Assessment/Narrative  Paresthesias: No.  .  .  clear CSF fluid removed . Time Injected: 12:21

## 2020-10-19 NOTE — PROGRESS NOTES
Patient resting in bed with family at bedside. Pain reassessed, patient has good pedal pulses and is able to lift legs bilateral off the bed. Patient informs this RN she has minimal tingeing in legs. Cathy pad changed with quarter size light colored dried blood. Report will be given to oncoming RN.

## 2020-10-19 NOTE — ANESTHESIA PREPROCEDURE EVALUATION
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.3]  Cystocele with rectocele [N81.10, N81.6]  Erosion of implanted vaginal mesh to surrounding tissue, initial encounter (H) [T83.379L]    Procedure(s):  COLPORRHAPHY, COMBINED ANTEROPOSTERIOR, ENTEROCELE REPAIR, COLPOCLIESIS, Repair eroded mesh  CYSTOSCOPY    Past Medical History:   Diagnosis Date     AC (acromioclavicular) arthritis 3/1/2018     Bunion of great toe of right foot     No Comments Provided     Carpal tunnel syndrome     2010     Complete tear of right rotator cuff 3/1/2018     Essential (primary) hypertension     No Comments Provided     Gastro-esophageal reflux disease without esophagitis     12/3/2012     Hyperlipidemia     10/4/2011     Impingement syndrome of right shoulder     10/09/07     Personal history of other medical treatment (CODE)      3, Para 3 with three vaginal deliveries     S/P arthroscopy of right shoulder 2018     Thoracic aortic ectasia (H)     3/23/2016,3.5-3.6 cm on CT 3/2016 - follow up in 1 year     Uterovaginal prolapse     preloader comment: selections available to preload differs from paper chart.   *Pelvic floor relaxation     Past Surgical History:   Procedure Laterality Date     ARTHROSCOPY SHOULDER ROTATOR CUFF REPAIR Right 2018    Procedure: ARTHROSCOPY SHOULDER ROTATOR CUFF REPAIR;  Right Shoulder Arthroscopy with Subacromial Decompression, Distal Clavicle Excision, Rotator Cuff Repair, Biceps Tenotomy, Extensive Debridement;  Surgeon: Wilner Painting DO;  Location: [facility] OR     AS SHOULDER ARTHROSCOPY, DX Right 18    Dr. Painting     BUNIONECTOMY      ,right     COLONOSCOPY      , normal     COLONOSCOPY  2015    Normal exam, F/U      HYSTERECTOMY VAGINAL      ,for uterine prolapse with A&P repair.  Tube and ovaries still in place.      LAPAROSCOPY DIAGNOSTIC (GYN)      1989,for pelvic adhesion     LAPAROSCOPY DIAGNOSTIC (GYN)      11/08,vaginal vault prolapse - Dr. Mumtaz Marshall - anterior/posterior repair with bladder mesh.     RELEASE CARPAL TUNNEL      Dr. Méndez     S/P RIGHT SHOULDER ARTHROSCOPY Right 04/11/2018       Anesthesia Evaluation     . Pt has had prior anesthetic.     History of anesthetic complications   - PONV        ROS/MED HX    ENT/Pulmonary: Comment: Exposed to second hand smoke    (+)tobacco use, , . .    Neurologic: Comment: Vertigo hx, CTS, lesion on popliteal nerve    (+)migraines,     Cardiovascular: Comment: Thorasic Aortic ectasia  Last Echo aorta 3.9 cm - stable    (+) Dyslipidemia, hypertension----. : . . . :. .       METS/Exercise Tolerance:  >4 METS   Hematologic:  - neg hematologic  ROS       Musculoskeletal:   (+) arthritis,  other musculoskeletal- Lumbosacral Spondylosis without Myelopathy      GI/Hepatic:     (+) GERD Asymptomatic on medication,       Renal/Genitourinary:  - ROS Renal section negative       Endo:     (+) Obesity, .      Psychiatric:  - neg psychiatric ROS       Infectious Disease:  - neg infectious disease ROS       Malignancy:      - no malignancy   Other:    - neg other ROS                      Physical Exam  Normal systems: cardiovascular, pulmonary and dental    Airway   Mallampati: II  TM distance: >3 FB  Neck ROM: full    Dental     Cardiovascular   Rhythm and rate: regular and normal      Pulmonary    breath sounds clear to auscultation            Lab Results   Component Value Date    WBC 6.8 10/01/2020    HGB 13.4 10/19/2020    HCT 42.0 10/01/2020     10/01/2020     10/01/2020    POTASSIUM 3.8 10/01/2020    CHLORIDE 102 10/01/2020    CO2 30 10/01/2020    BUN 22 10/01/2020    CR 0.84 10/01/2020     10/01/2020    JOSÉ MIGUEL 9.8 10/01/2020    ALBUMIN 4.6 09/03/2020    PROTTOTAL 7.4 09/03/2020    ALT 21 09/03/2020    AST 20 09/03/2020    ALKPHOS 75 09/03/2020    BILITOTAL 0.8  "09/03/2020    PTT 31 03/09/2016    INR 1.2 03/09/2016       Preop Vitals  BP Readings from Last 3 Encounters:   10/19/20 (!) 164/96   10/01/20 130/84   09/14/20 126/76    Pulse Readings from Last 3 Encounters:   10/01/20 75   09/14/20 68   09/03/20 65      Resp Readings from Last 3 Encounters:   10/19/20 16   10/01/20 16   09/14/20 18    SpO2 Readings from Last 3 Encounters:   10/19/20 98%   10/01/20 98%   09/14/20 98%      Temp Readings from Last 1 Encounters:   10/19/20 98.8  F (37.1  C) (Tympanic)    Ht Readings from Last 1 Encounters:   10/19/20 1.473 m (4' 10\")      Wt Readings from Last 1 Encounters:   10/19/20 68 kg (150 lb)    Estimated body mass index is 31.35 kg/m  as calculated from the following:    Height as of this encounter: 1.473 m (4' 10\").    Weight as of this encounter: 68 kg (150 lb).       Anesthesia Plan      History & Physical Review      ASA Status:  2 .    NPO Status:  > 8 hours    Plan for Spinal   PONV prophylaxis:  Ondansetron (or other 5HT-3) and Dexamethasone or Solumedrol    The patient is not a current smoker      Postoperative Care      Consents  Anesthetic plan, risks, benefits and alternatives discussed with:  Patient.  Use of blood products discussed: No .   .                 LUX Urbina CRNA  "

## 2020-10-20 VITALS
SYSTOLIC BLOOD PRESSURE: 113 MMHG | DIASTOLIC BLOOD PRESSURE: 73 MMHG | BODY MASS INDEX: 29.42 KG/M2 | RESPIRATION RATE: 16 BRPM | HEART RATE: 78 BPM | OXYGEN SATURATION: 95 % | HEIGHT: 59 IN | WEIGHT: 145.94 LBS | TEMPERATURE: 98.9 F

## 2020-10-20 LAB — HGB BLD-MCNC: 12 G/DL (ref 11.7–15.7)

## 2020-10-20 PROCEDURE — 85018 HEMOGLOBIN: CPT | Performed by: OBSTETRICS & GYNECOLOGY

## 2020-10-20 PROCEDURE — 250N000013 HC RX MED GY IP 250 OP 250 PS 637: Performed by: OBSTETRICS & GYNECOLOGY

## 2020-10-20 PROCEDURE — 36415 COLL VENOUS BLD VENIPUNCTURE: CPT | Performed by: OBSTETRICS & GYNECOLOGY

## 2020-10-20 RX ADMIN — HYDROCHLOROTHIAZIDE 25 MG: 25 TABLET ORAL at 09:58

## 2020-10-20 RX ADMIN — ACETAMINOPHEN 650 MG: 325 TABLET, FILM COATED ORAL at 06:09

## 2020-10-20 RX ADMIN — HYDROCODONE BITARTRATE AND ACETAMINOPHEN 1 TABLET: 5; 325 TABLET ORAL at 16:53

## 2020-10-20 RX ADMIN — ACETAMINOPHEN 650 MG: 325 TABLET, FILM COATED ORAL at 12:04

## 2020-10-20 NOTE — PLAN OF CARE
"Appears comfortable, sleeping rt side. Respirations even and non labored. /69   Pulse 68   Temp 97.8  F (36.6  C) (Tympanic)   Resp 16   Ht 1.499 m (4' 11\")   Wt 66.2 kg (145 lb 15.1 oz)   LMP  (LMP Unknown)   SpO2 97%   Breastfeeding No   BMI 29.48 kg/m    Continue to monitor comfort and safety.  "

## 2020-10-20 NOTE — PLAN OF CARE
"Denies need for pain management. Afebrile, A&O. Cathy pad dry with no drainage. Lungs clear, discussed TCB. CMS intact. Continue to maintain comfort and safety. /74   Pulse 70   Temp 98  F (36.7  C) (Tympanic)   Resp 16   Ht 1.499 m (4' 11\")   Wt 66.2 kg (145 lb 15.1 oz)   LMP  (LMP Unknown)   SpO2 96%   Breastfeeding No   BMI 29.48 kg/m      "

## 2020-10-20 NOTE — PROGRESS NOTES
NSG DISCHARGE NOTE    Patient discharged to home at 5:00 PM via wheel chair. Accompanied by daughter and staff. Discharge instructions reviewed with patient, opportunity offered to ask questions. Prescriptions sent to patients preferred pharmacy. All belongings sent with patient.    Peyton Huitron RN

## 2020-10-20 NOTE — PROGRESS NOTES
Doing well POD#1  No concerns.  General diet.  Block remains in  AVSS   Abd: soft, benign    Hgb: 12    A: POD#1, s/p repair of vaginal vault prolapse and resection of eroded mid-urethral slng - doing well.    P: Continue postop care  Planning on removal of block after lunch with voiding trial  Routine instructions given  Discharge home this afternoon after voiding trial

## 2020-10-20 NOTE — DISCHARGE SUMMARY
Preop Dx: Vaginal vault prolapse with cystocele, enterocele, rectocele, erosion of mid urethral sling  Postop Dx: Same  Proc with a residual edure: Anterior and posterior colporrhaphy with enterocele repair, relative colpocleisis; excision of eroded mesh, cystoscopy  Surgeon : ARYA Nicole MD: [facility]    Clinical History: 70-year-old who is had a hysterectomy and bladder repair over 40 years ago.  11 years ago had a repeat anterior and posterior colporrhaphy with a mid urethral sling.  Now has complete vaginal vault prolapse and has eroded a 4+ centimeter area of the mesh.    Please see the operative report for details.  Suffice to say, repairs were extensive but went well.  Approximately 4-1/2 cm of mesh was removed as best possible.    Hospital Course: Did very well.  General diet.  Moses catheter was left in place for 24 hours but when removed she voided well with a residual of less than 40 cc.  Desired discharge on postoperative day #1    Hemoglobin 12.0    Disposition: Home    Discharge Meds: Ibuprofen, acetaminophen and continuation of her preop medications    F/U: 2 + 6 weeks

## 2020-10-20 NOTE — PLAN OF CARE
VSS, afebrile, pain 3-4/10.  PRN tylenol and ice packs helping with this.    Packing removed.  Pt tolerated well.  No odor noted.  Pt up and moving around room with SBA - A1, due to catheter, and doing well.  Granddaughter, Mery, here to visit.  Will continue to monitor.  Meagan Crisostomo RN............................ 10/20/2020 10:06 AM    Catheter removed at 1225.  Tolerated well.  Cranberry juice, coffee, water given to pt to facilitate voiding.  Meagan Crisostomo RN............................ 10/20/2020 12:30 PM    Pt voided 300 mL at 1430 with a PVR of 38.  Dr THALIA Reyna notified.  Spoke with pt - she doesn't want any narcotics prescribed for at home.  She believes tylenol/ibuprofen will be fine.  Pt currently in shower.  Meagan Crisostomo RN............................ 10/20/2020 2:50 PM

## 2020-11-04 ENCOUNTER — VIRTUAL VISIT (OUTPATIENT)
Dept: OBGYN | Facility: OTHER | Age: 70
End: 2020-11-04
Attending: OBSTETRICS & GYNECOLOGY
Payer: MEDICARE

## 2020-11-04 VITALS — DIASTOLIC BLOOD PRESSURE: 82 MMHG | TEMPERATURE: 98.4 F | HEART RATE: 72 BPM | SYSTOLIC BLOOD PRESSURE: 126 MMHG

## 2020-11-04 DIAGNOSIS — Z09 POSTOP CHECK: Primary | ICD-10-CM

## 2020-11-04 PROCEDURE — 99024 POSTOP FOLLOW-UP VISIT: CPT | Mod: TEL | Performed by: OBSTETRICS & GYNECOLOGY

## 2020-11-04 ASSESSMENT — PAIN SCALES - GENERAL: PAINLEVEL: NO PAIN (0)

## 2020-11-04 NOTE — NURSING NOTE
"Arlyn Holder is a 70 year old female who is being evaluated via a billable telephone visit.    She is now two weeks out from repair of an enterocele with anterior and posterior colporrhaphy with a relative colpocleisis and re-dissection of eroded sling. She is feeling well and has been increasing activity as tolerated. She is anxious to get back to a walking program and routine housework. She reports no pain. She does have some increased discomfort in the afternoon and will rest to relieve it. She does have several other questions today about activity restrictions.    The patient has been notified of following:     \"This telephone visit will be conducted via a call between you and your physician/provider. We have found that certain health care needs can be provided without the need for a physical exam.  This service lets us provide the care you need with a short phone conversation.  If a prescription is necessary we can send it directly to your pharmacy.  If lab work is needed we can place an order for that and you can then stop by our lab to have the test done at a later time.    Telephone visits are billed at different rates depending on your insurance coverage. During this emergency period, for some insurers they may be billed the same as an in-person visit.  Please reach out to your insurance provider with any questions.    If during the course of the call the physician/provider feels a telephone visit is not appropriate, you will not be charged for this service.\"    Patient has given verbal consent for Telephone visit?  Yes    What phone number would you like to be contacted at? [phone]    How would you like to obtain your AVS? Mail a copy    Missy Painting RN...................11/4/2020 5:03 PM        "

## 2020-11-04 NOTE — PROGRESS NOTES
Arlyn was seen by Soheila Painting RN in my absence as I was tied up in surgery.  I then called the patient and had a 15-minute conversation with her.  She is doing exceptionally well.  Very anxious to get back to more exercise and activity.  She has a light normal vaginal discharge.  No bleeding.  Bladder and bowel function are good.    We discussed a gradual return of activity over the next 4 weeks.  We will plan to see her back for her 6-week visit.  All questions answered

## 2020-12-02 ENCOUNTER — OFFICE VISIT (OUTPATIENT)
Dept: OBGYN | Facility: OTHER | Age: 70
End: 2020-12-02
Attending: OBSTETRICS & GYNECOLOGY
Payer: MEDICARE

## 2020-12-02 VITALS
WEIGHT: 154.6 LBS | BODY MASS INDEX: 31.23 KG/M2 | HEART RATE: 88 BPM | DIASTOLIC BLOOD PRESSURE: 96 MMHG | SYSTOLIC BLOOD PRESSURE: 132 MMHG

## 2020-12-02 DIAGNOSIS — Z09 POSTOP CHECK: Primary | ICD-10-CM

## 2020-12-02 PROCEDURE — 99024 POSTOP FOLLOW-UP VISIT: CPT | Performed by: OBSTETRICS & GYNECOLOGY

## 2020-12-02 PROCEDURE — G0463 HOSPITAL OUTPT CLINIC VISIT: HCPCS

## 2020-12-02 ASSESSMENT — PAIN SCALES - GENERAL: PAINLEVEL: NO PAIN (0)

## 2020-12-02 NOTE — PROGRESS NOTES
Arlyn is here for her 6-week visit from an anterior and posterior colporrhaphy with enterocele repair and excision of eroded mesh.  Is overall doing very well.  Did take a bad fall before November but otherwise has had light activity only.  Discharge stopped about a week ago.  Bladder and bowel function good.    On examination  Vitals are as listed  Speculum exam shows the vagina to be healed well.  There is about a grade 1 anterior descent still present with good apical and posterior support.    Assessment: Satisfactory coverage from surgery plan: Gradual release from restrictions.  Advised avoidance of heavy lifting etc. in the future.  I would like to see her in 6 to 12 months to make sure there is no sign of additional mesh eroding.

## 2020-12-02 NOTE — NURSING NOTE
"Chief Complaint   Patient presents with     Follow Up     Patient stated she has no concerns and she stopped draining from the vagina on Monday.    Initial BP (!) 132/96 (BP Location: Right arm, Patient Position: Sitting, Cuff Size: Adult Regular)   Pulse 88   Wt 70.1 kg (154 lb 9.6 oz)   LMP  (LMP Unknown)   Breastfeeding No   BMI 31.23 kg/m   Estimated body mass index is 31.23 kg/m  as calculated from the following:    Height as of 10/19/20: 1.499 m (4' 11\").    Weight as of this encounter: 70.1 kg (154 lb 9.6 oz).  Medication Reconciliation: Completed     Rafael Marks LPN  "

## 2021-06-30 DIAGNOSIS — E87.6 HYPOKALEMIA: ICD-10-CM

## 2021-06-30 RX ORDER — POTASSIUM CHLORIDE 1500 MG/1
20 TABLET, EXTENDED RELEASE ORAL DAILY
Qty: 90 TABLET | Refills: 0 | Status: SHIPPED | OUTPATIENT
Start: 2021-06-30 | End: 2021-09-21

## 2021-06-30 NOTE — TELEPHONE ENCOUNTER
potassium chloride ER (KLOR-CON M) 20 MEQ CR tablet 90 tablet 3 9/4/2020  No   Sig - Route: Take 1 tablet (20 mEq) by mouth daily - Oral     To [facility].  [facility] states that patient only has #65 for remaining refills and patient would like #90    Astrid Reyna RN on 6/30/2021 at 8:56 AM

## 2021-06-30 NOTE — TELEPHONE ENCOUNTER
"Chito Valdez  sent Rx request for the following:     Requested Prescriptions   Pending Prescriptions Disp Refills     potassium chloride ER (KLOR-CON M) 20 MEQ CR tablet 90 tablet 3     Sig: Take 1 tablet (20 mEq) by mouth daily         Last Prescription Date:   9/4/20020  Last Fill Qty/Refills:         90, R-3    Last Office Visit:              10/1/2020  Future Office visit:           none     Potassium Supplements Protocol Passed - 6/30/2021  1:44 PM        Passed - Recent (12 mo) or future (30 days) visit within the authorizing provider's department     Patient has had an office visit with the authorizing provider or a provider within the authorizing providers department within the previous 12 mos or has a future within next 30 days. See \"Patient Info\" tab in inbasket, or \"Choose Columns\" in Meds & Orders section of the refill encounter.              Passed - Medication is active on med list        Passed - Patient is age 18 or older        Passed - Normal serum potassium in past 12 months     Recent Labs   Lab Test 10/01/20  1157   POTASSIUM 3.8                     Prescription approved per [facility] Refill Protocol.  Lindy Snyder RN ,....................  6/30/2021   2:59 PM      "

## 2021-07-20 ENCOUNTER — OFFICE VISIT (OUTPATIENT)
Dept: FAMILY MEDICINE | Facility: OTHER | Age: 71
End: 2021-07-20
Attending: NURSE PRACTITIONER
Payer: COMMERCIAL

## 2021-07-20 VITALS
HEART RATE: 95 BPM | SYSTOLIC BLOOD PRESSURE: 138 MMHG | WEIGHT: 147.5 LBS | OXYGEN SATURATION: 96 % | BODY MASS INDEX: 29.79 KG/M2 | DIASTOLIC BLOOD PRESSURE: 90 MMHG | RESPIRATION RATE: 13 BRPM | TEMPERATURE: 100.1 F

## 2021-07-20 DIAGNOSIS — M54.9 BACK PAIN, UNSPECIFIED BACK LOCATION, UNSPECIFIED BACK PAIN LATERALITY, UNSPECIFIED CHRONICITY: ICD-10-CM

## 2021-07-20 DIAGNOSIS — M54.2 NECK PAIN: Primary | ICD-10-CM

## 2021-07-20 PROCEDURE — 99213 OFFICE O/P EST LOW 20 MIN: CPT | Performed by: NURSE PRACTITIONER

## 2021-07-20 PROCEDURE — G0463 HOSPITAL OUTPT CLINIC VISIT: HCPCS

## 2021-07-20 RX ORDER — PREDNISONE 20 MG/1
40 TABLET ORAL DAILY
Qty: 10 TABLET | Refills: 0 | Status: SHIPPED | OUTPATIENT
Start: 2021-07-20 | End: 2021-07-25

## 2021-07-20 RX ORDER — CYCLOBENZAPRINE HCL 10 MG
10 TABLET ORAL 3 TIMES DAILY PRN
Qty: 15 TABLET | Refills: 0 | Status: SHIPPED | OUTPATIENT
Start: 2021-07-20

## 2021-07-20 RX ORDER — AZITHROMYCIN 250 MG/1
TABLET, FILM COATED ORAL
COMMUNITY
Start: 2020-12-24 | End: 2022-06-15

## 2021-07-20 ASSESSMENT — PAIN SCALES - GENERAL: PAINLEVEL: EXTREME PAIN (8)

## 2021-07-20 NOTE — NURSING NOTE
"Chief Complaint   Patient presents with     Musculoskeletal Problem     Patient is here today for ear/body pain that started happening last Thursday 7/15. She has shooting pain in her left ear. When she stands up she has a shoot pain that goes up her leg and into her groin.     FOOD SECURITY SCREENING QUESTIONS  Hunger Vital Signs:  Within the past 12 months we worried whether our food would run out before we got money to buy more. Never  Within the past 12 months the food we bought just didn't last and we didn't have money to get more. Never  Laura Morse LPN 7/20/2021 4:56 PM      Initial BP (!) 138/92 (BP Location: Right arm, Patient Position: Sitting, Cuff Size: Adult Regular)   Pulse 95   Temp 100.1  F (37.8  C) (Tympanic)   Resp 13   Wt 66.9 kg (147 lb 8 oz)   LMP  (LMP Unknown)   SpO2 96%   BMI 29.79 kg/m   Estimated body mass index is 29.79 kg/m  as calculated from the following:    Height as of 10/19/20: 1.499 m (4' 11\").    Weight as of this encounter: 66.9 kg (147 lb 8 oz).  Medication Reconciliation: complete    Laura Morse LPN  "

## 2021-07-20 NOTE — PATIENT INSTRUCTIONS
Patient Education     Back Care Tips     Caring for your back  These are things you can do to prevent a recurrence of acute back pain and to reduce symptoms from chronic back pain:    Stay at a healthy weight. If you are overweight, losing weight will help most types of back pain.    Exercise is an important part of recovery from most types of back pain. The muscles behind and in front of the spine support the back. This means strengthening both the back muscles and the abdominal muscles will provide better support for your spine.     Swimming and brisk walking are good overall exercises to improve your fitness level.    Practice safe lifting methods (see below).    Practice good posture when sitting, standing, and walking. Don't sit for a long time. This puts more stress on the lower back than standing or walking.    Wear quality shoes with good arch support. Foot and ankle alignment can affect back symptoms. Don't wear high heels.    Therapeutic massage can help relax the back muscles without stretching them.    During the first 24 to 72 hours after an acute injury or flare-up of chronic back pain, put an ice pack on the painful area for 20 minutes and then remove it for 20 minutes. Do thisover a period of 60 to 90 minutes, or several times a day. As a safety precaution, don't use a heating pad at bedtime. Sleeping on a heating pad can lead to skin burns or tissue damage.    You can alternate using ice and heat.  Medicines  Talk with your healthcare provider before using medicines, especially if you have other health problems or are taking other medicines.    You may use over-the-counter medicines, such as acetaminophen, ibuprofen, or naprosyn to control pain, unless your healthcare provider prescribed other pain medicine. Talk with your healthcare provider before taking any medicines if you have a chronic condition such as diabetes, liver or kidney disease, stomach ulcers, or digestive bleeding, or are taking  blood thinners.    Be careful if you are given prescription pain medicines, opioids, or medicine for muscle spasm. They can cause drowsiness, and affect your coordination, reflexes, and judgment. Don't drive or operate heavy machinery while taking these types of medicines. Take prescription pain medicine only as prescribed by your healthcare provider.  Lumbar stretch  This simple stretch will help relax muscle spasm and keep your back more limber. If exercise makes your back pain worse, don t do it.    Lie on your back with your knees bent and both feet on the ground.    Slowly raise your left knee to your chest as you flatten your lower back against the floor. Hold for 5 seconds.    Relax and repeat the exercise with your right knee.    Do 10 of these exercises for each leg.  Safe lifting method    Don t bend over at the waist to lift an object off the floor.  Instead, bend your knees and hips in a squat.     Keep your back and head upright    Hold the object close to your body, directly in front of you.    Straighten your legs to lift the object.     Lower the object to the floor in the reverse fashion.    If you must slide something across the floor, push it.    Posture tips  Sitting  Sit in chairs with straight backs or low-back support. Keep your knees lower than your hips, with your feet flat on the floor.  When driving, sit up straight. Adjust the seat forward so you are not leaning toward the steering wheel.  A small pillow or rolled towel behind your lower back may help if you are driving long distances.   Standing  When standing for long periods, shift most of your weight to one leg at a time. Switch legs every few minutes.   Sleeping  The best way to sleep is on your side with your knees bent. Put a low pillow under your head to support your neck in a neutral spine position. Don't use thick pillows that bend your neck to one side. Put a pillow between your legs to further relax your lower back. If you  sleep on your back, put pillows under your knees to support your legs in a slightly flexed position. Use a firm mattress. If your mattress sags, replace it, or use a 1/2-inch plywood board under the mattress to add support.  Follow-up care  Follow up with your healthcare provider, or as advised.  If X-rays, a CT scan or an MRI scan were taken, they may be reviewed by a radiologist. You will be told of any new findings that may affect your care.  Call 911  Call 911 if any of the following occur:    Trouble breathing    Confusion    Very drowsy    Fainting or loss of consciousness    Rapid or very slow heart rate    Loss of  bowel or bladder control  When to seek medical advice  Call your healthcare provider right away if any of the following occur:    Pain becomes worse or spreads to your arms or legs    Weakness or numbness in one or both arms or legs    Numbness in the groin area  [employer] last reviewed this educational content on 11/1/2019 2000-2021 [employer]. All rights reserved. This information is not intended as a substitute for professional medical care. Always follow your healthcare professional's instructions.           Patient Education     Neck Spasm   A spasm of the neck muscles can happen after a sudden awkward neck movement. Sleeping with your neck in a crooked position can also cause spasm. Some people respond to emotional stress by tensing the muscles of their neck, shoulders, and upper back. If neck spasm lasts long enough, it can cause a headache.  The treatment described below will usually help the pain to go away in 5 to 7 days. Pain that continues may need further evaluation or other types of treatment such as physical therapy.  Home care    Rest and relax the muscles. Use a comfortable pillow that supports the head and keeps the spine in a neutral position. The position of the head should not be tilted forward or backward. A rolled up towel may help for a custom fit.    Some  people find relief with heat. Heat can be applied with either a warm shower or bath or a moist towel heated in the microwave and massage. Others prefer cold packs. You can make an ice pack by filling a plastic bag that seals at the top with ice cubes or crushed ice and then wrapping it with a thin towel. Try both and use the method that feels best for 15 to 20 minutes, several times a day.    Whether using ice or heat, be careful that you don't injure your skin. Never put ice directly on the skin. Always wrap the ice in a towel or other type of cloth. This is very important, especially in people with poor skin sensation.    Try to reduce your stress level. Emotional stress can lead to neck muscle tension and get in the way of or delay the healing process.    You may use over-the-counter pain medicine to control pain, unless another medicine was prescribed. If you have chronic liver or kidney disease or ever had a stomach ulcer or gastrointestinal bleeding, talk with your healthcare provider before using these medicines.    Follow-up care  Follow up with your healthcare provider if your symptoms don't show signs of improvement after one week. Physical therapy or further tests may be needed.  If X-rays, CT scans, or MRI scans were taken, you will be told of any new findings that may affect your care.  Call 911  Call 911 if you have:    Sudden weakness or numbness in one or both arms or legs    Neck swelling, trouble with or painful swallowing    Trouble breathing    Chest pain  When to seek medical advice  Call your healthcare provider right away if any of these occur:    Pain becomes worse or spreads into one or both arms or legs    Increasing headache with nausea or vomiting    Fever of 100.4 F (38 C) or higher, or as directed by your healthcare provider    Lokesh Marsh last reviewed this educational content on 5/1/2018 2000-2021 [employer]. All rights reserved. This information is not intended  as a substitute for professional medical care. Always follow your healthcare professional's instructions.

## 2021-07-20 NOTE — PROGRESS NOTES
ASSESSMENT/PLAN:  1. Back pain, unspecified back location, unspecified back pain laterality, unspecified chronicity    - cyclobenzaprine (FLEXERIL) 10 MG tablet; Take 1 tablet (10 mg) by mouth 3 times daily as needed for muscle spasms  Dispense: 15 tablet; Refill: 0  - predniSONE (DELTASONE) 20 MG tablet; Take 2 tablets (40 mg) by mouth daily for 5 days  Dispense: 10 tablet; Refill: 0    2. Neck pain    - cyclobenzaprine (FLEXERIL) 10 MG tablet; Take 1 tablet (10 mg) by mouth 3 times daily as needed for muscle spasms  Dispense: 15 tablet; Refill: 0  - predniSONE (DELTASONE) 20 MG tablet; Take 2 tablets (40 mg) by mouth daily for 5 days  Dispense: 10 tablet; Refill: 0    Discussed with the patient that based on her symptoms and physical exam findings her symptoms are most likely related to muscle strain/muscle spasms of her neck and back pain with sciatica of her lower back. The patient's right ear pain may be due to radiating pain from her neck pain.     The patient was prescribed prednisone 40 mg daily xx 5 days and flexeril TID PRN for muscle spasms.     The patient was instructed to try both warm/cool compresses to the affected areas.     May use over-the-counter Tylenol or ibuprofen PRN    Discussed warning signs/symptoms indicative of need to f/u    Follow up if symptoms persist or worsen or concerns      I explained my diagnostic considerations and recommendations to the patient, who voiced understanding and agreement with the treatment plan. All questions were answered. We discussed potential side effects of any prescribed or recommended therapies, as well as expectations for response to treatments.        HPI:    Arlyn Holder is a 71 year old female  who presents to [facility] today for left ear and neck pain. The patient reports that the pain is intermittent. She reports that her pain started on thursday. She reports taking ibuprofen and flexeril. However, she reports that the flexeril is a prescription  that she filled in 2019. Rating her neck pain 10. The patient reports that her neck pain is acute. She reports also having chronic lower back pain. The patient states that she is also having lower back pain that is radiating down her right leg. Denies numbness or tingling. Denies new onset bowel or bladder incontinence. Denies any injuries to her lower back or neck. Reports lifting boxes the day before she started having the pain. Denies trouble hearing from her left ear.     Past Medical History:   Diagnosis Date     AC (acromioclavicular) arthritis 3/1/2018     Bunion of great toe of right foot     No Comments Provided     Carpal tunnel syndrome     2010     Complete tear of right rotator cuff 3/1/2018     Essential (primary) hypertension     No Comments Provided     Gastro-esophageal reflux disease without esophagitis     12/3/2012     Hyperlipidemia     10/4/2011     Impingement syndrome of right shoulder     10/09/07     Personal history of other medical treatment (CODE)      3, Para 3 with three vaginal deliveries     S/P arthroscopy of right shoulder 2018     Thoracic aortic ectasia (H)     3/23/2016,3.5-3.6 cm on CT 3/2016 - follow up in 1 year     Uterovaginal prolapse     preloader comment: selections available to preload differs from paper chart.   *Pelvic floor relaxation     Past Surgical History:   Procedure Laterality Date     ARTHROSCOPY SHOULDER ROTATOR CUFF REPAIR Right 2018    Procedure: ARTHROSCOPY SHOULDER ROTATOR CUFF REPAIR;  Right Shoulder Arthroscopy with Subacromial Decompression, Distal Clavicle Excision, Rotator Cuff Repair, Biceps Tenotomy, Extensive Debridement;  Surgeon: Wilner Painting DO;  Location: [facility] OR     AS SHOULDER ARTHROSCOPY, DX Right 18    Dr. Painting     BUNIONECTOMY      ,right     COLONOSCOPY      , normal     COLONOSCOPY  2015    Normal exam, F/U      COLPORRHAPHY ANTERIOR, POSTERIOR, COMBINED N/A 10/19/2020     Procedure: COLPORRHAPHY, COMBINED ANTEROPOSTERIOR, ENTEROCELE REPAIR, COLPOCLIESIS, Repair eroded mesh;  Surgeon: Wade Reyna MD;  Location: [facility] OR     CYSTOSCOPY N/A 10/19/2020    Procedure: CYSTOSCOPY;  Surgeon: Wade Reyna MD;  Location: [facility] OR     HYSTERECTOMY VAGINAL      1982,for uterine prolapse with A&P repair.  Tube and ovaries still in place.     LAPAROSCOPY DIAGNOSTIC (GYN)      1989,for pelvic adhesion     LAPAROSCOPY DIAGNOSTIC (GYN)      11/08,vaginal vault prolapse - Dr. Mumtaz Marshall - anterior/posterior repair with bladder mesh.     RELEASE CARPAL TUNNEL      Dr. Méndez     S/P RIGHT SHOULDER ARTHROSCOPY Right 04/11/2018     Social History     Tobacco Use     Smoking status: Passive Smoke Exposure - Never Smoker     Smokeless tobacco: Never Used     Tobacco comment: Quit smoking:  was a smoker   Substance Use Topics     Alcohol use: No     Alcohol/week: 0.0 standard drinks     Current Outpatient Medications   Medication Sig Dispense Refill     atorvastatin (LIPITOR) 20 MG tablet Take 1 tablet (20 mg) by mouth daily 90 tablet 3     azithromycin (ZITHROMAX) 250 MG tablet TAKE 2 TABLETS BY MOUTH TODAY, THEN TAKE 1 TABLET DAILY FOR 4 DAYS       Cyanocobalamin (VITAMIN B-12 CR) 1000 MCG TBCR        cyclobenzaprine (FLEXERIL) 10 MG tablet Take 1 tablet (10 mg) by mouth 3 times daily as needed for muscle spasms 30 tablet 11     hydrochlorothiazide (HYDRODIURIL) 25 MG tablet Take 1 tablet (25 mg) by mouth daily 90 tablet 3     lecithin 400 MG CAPS Take 1 capsule by mouth 2 times daily       Multiple Vitamins-Minerals (MENS MULTIVITAMIN PLUS) TABS  30 tablet      potassium chloride ER (KLOR-CON M) 20 MEQ CR tablet Take 1 tablet (20 mEq) by mouth daily 90 tablet 0     triamcinolone (KENALOG) 0.1 % external cream Apply topically 2 times daily 30 g 4     vitamin E (TOCOPHEROL) 100 UNIT capsule        aspirin (ASPIRIN LOW DOSE) 81 MG tablet Take by mouth daily (Patient not taking: Reported on  7/20/2021) 30 tablet      Allergies   Allergen Reactions     Codeine Nausea and Vomiting     Diazepam      Other reaction(s): Hallucinations     Naproxen Other (See Comments)     Mouth sores     Sulfa Drugs Unknown     Chlorhexidine Rash     Sulfacetamide Rash     Had a reaction to sulfa drug but is not sure which one it was-states the reaction resulted in redness         Past medical history, past surgical history, current medications and allergies reviewed and accurate to the best of my knowledge.        ROS:  Refer to HPI    BP (!) 138/90 (BP Location: Right arm, Patient Position: Sitting, Cuff Size: Adult Regular)   Pulse 95   Temp 100.1  F (37.8  C) (Tympanic)   Resp 13   Wt 66.9 kg (147 lb 8 oz)   LMP  (LMP Unknown)   SpO2 96%   BMI 29.79 kg/m      EXAM:  General Appearance: Well appearing female, appropriate appearance for age. No acute distress  Ears: Left TM intact, translucent with bony landmarks appreciated, no erythema, no effusion, no bulging, no purulence.  Right TM intact, translucent with bony landmarks appreciated, no erythema, no effusion, no bulging, no purulence.  Left auditory canal clear.  Right auditory canal clear.  Normal external ears, non tender.  Musculoskeletal:  Equal movement of bilateral upper extremities.  Equal movement of bilateral lower extremities. The patient has limited flexion and extension of her back and neck due to pain.  Normal gait.    Dermatological: No swelling, erythema or ecchymosis noted on exam.   Psychological: normal affect, alert, oriented, and pleasant.

## 2021-09-07 ENCOUNTER — TELEPHONE (OUTPATIENT)
Dept: FAMILY MEDICINE | Facility: OTHER | Age: 71
End: 2021-09-07

## 2021-09-08 ENCOUNTER — OFFICE VISIT (OUTPATIENT)
Dept: INTERNAL MEDICINE | Facility: OTHER | Age: 71
End: 2021-09-08
Attending: NURSE PRACTITIONER
Payer: MEDICARE

## 2021-09-08 VITALS
OXYGEN SATURATION: 98 % | DIASTOLIC BLOOD PRESSURE: 88 MMHG | SYSTOLIC BLOOD PRESSURE: 136 MMHG | BODY MASS INDEX: 30.52 KG/M2 | HEIGHT: 59 IN | RESPIRATION RATE: 14 BRPM | WEIGHT: 151.4 LBS | TEMPERATURE: 97 F | HEART RATE: 83 BPM

## 2021-09-08 DIAGNOSIS — L02.214 ABSCESS OF GROIN: Primary | ICD-10-CM

## 2021-09-08 PROCEDURE — 10060 I&D ABSCESS SIMPLE/SINGLE: CPT | Performed by: NURSE PRACTITIONER

## 2021-09-08 PROCEDURE — 87070 CULTURE OTHR SPECIMN AEROBIC: CPT | Mod: ZL | Performed by: NURSE PRACTITIONER

## 2021-09-08 PROCEDURE — G0463 HOSPITAL OUTPT CLINIC VISIT: HCPCS | Mod: 25

## 2021-09-08 PROCEDURE — G0463 HOSPITAL OUTPT CLINIC VISIT: HCPCS

## 2021-09-08 PROCEDURE — 99213 OFFICE O/P EST LOW 20 MIN: CPT | Mod: 25 | Performed by: NURSE PRACTITIONER

## 2021-09-08 RX ORDER — CLINDAMYCIN HCL 300 MG
300 CAPSULE ORAL 4 TIMES DAILY
Qty: 28 CAPSULE | Refills: 0 | Status: SHIPPED | OUTPATIENT
Start: 2021-09-08 | End: 2021-09-15

## 2021-09-08 ASSESSMENT — PAIN SCALES - GENERAL: PAINLEVEL: MODERATE PAIN (5)

## 2021-09-08 ASSESSMENT — ENCOUNTER SYMPTOMS
SLEEP DISTURBANCE: 1
FEVER: 0
WOUND: 1

## 2021-09-08 ASSESSMENT — MIFFLIN-ST. JEOR: SCORE: 1097.38

## 2021-09-08 NOTE — NURSING NOTE
"Chief Complaint   Patient presents with     Musculoskeletal Problem     Painful lump; lower left quadrant     Patient presents for pain in lower left abdomen; states that she noticed a lump on 9/5/21 and had been mowing all day.  Patient states lump is 2\" in diameter and is red, swollen, warm to touch and painful.    Initial /88 (BP Location: Right arm, Patient Position: Sitting, Cuff Size: Adult Regular)   Pulse 83   Temp 97  F (36.1  C) (Tympanic)   Resp 14   Ht 1.499 m (4' 11\")   Wt 68.7 kg (151 lb 6.4 oz)   LMP  (LMP Unknown)   SpO2 98%   Breastfeeding No   BMI 30.58 kg/m   Estimated body mass index is 30.58 kg/m  as calculated from the following:    Height as of this encounter: 1.499 m (4' 11\").    Weight as of this encounter: 68.7 kg (151 lb 6.4 oz).  Medication Reconciliation: complete  FOOD SECURITY SCREENING QUESTIONS  Hunger Vital Signs:  Within the past 12 months we worried whether our food would run out before we got money to buy more. Never  Within the past 12 months the food we bought just didn't last and we didn't have money to get more. Never    Advance care plan reviewed      Zhanna Hand LPN    "

## 2021-09-09 NOTE — PROGRESS NOTES
"Arlyn Holder  : 1950 Age: 71 year old Sex: female MRN: [id number]    CC:   Chief Complaint   Patient presents with     Musculoskeletal Problem     Painful lump; lower left quadrant       LIYA Score:    No flowsheet data found.    PHQ-2 Score:     PHQ-2 (  [employer]) 2021   Q1: Little interest or pleasure in doing things 0 0   Q2: Feeling down, depressed or hopeless 1 0   PHQ-2 Score 1 0            Tobacco Use      Smoking status: Passive Smoke Exposure - Never Smoker      Smokeless tobacco: Never Used      Tobacco comment: Quit smoking:  was a smoker      NURSE'S NOTES:    Nursing Notes:   Zhanna Hand LPN  2021 11:15 AM  Signed  Chief Complaint   Patient presents with     Musculoskeletal Problem     Painful lump; lower left quadrant     Patient presents for pain in lower left abdomen; states that she noticed a lump on 21 and had been mowing all day.  Patient states lump is 2\" in diameter and is red, swollen, warm to touch and painful.    Initial /88 (BP Location: Right arm, Patient Position: Sitting, Cuff Size: Adult Regular)   Pulse 83   Temp 97  F (36.1  C) (Tympanic)   Resp 14   Ht 1.499 m (4' 11\")   Wt 68.7 kg (151 lb 6.4 oz)   LMP  (LMP Unknown)   SpO2 98%   Breastfeeding No   BMI 30.58 kg/m   Estimated body mass index is 30.58 kg/m  as calculated from the following:    Height as of this encounter: 1.499 m (4' 11\").    Weight as of this encounter: 68.7 kg (151 lb 6.4 oz).  Medication Reconciliation: complete  FOOD SECURITY SCREENING QUESTIONS  Hunger Vital Signs:  Within the past 12 months we worried whether our food would run out before we got money to buy more. Never  Within the past 12 months the food we bought just didn't last and we didn't have money to get more. Never    Advance care plan reviewed      Zhanna Hand LPN         Nursing note reviewed with patient.  Accuracy and completeness verified.      SUBJECTIVE:                                 " "                     HPI:   Arlyn Holder presents to the clinic today with complaints of a lump in her upper mid groin area. She noticed this on 9/5/21. She reports it is about 2 inches in diameter, is very painful to touch, warm and red, swollen. It is painful to sit and sends pain down her upper left thigh.    Arlyn Holder also presents with:    Patient Active Problem List   Diagnosis     Arthritis of right wrist     Ascending aorta dilatation (H)     Enthesopathy of hip region     Carpal tunnel syndrome     Degeneration of cervical intervertebral disc     Chondromalacia of patella     GERD (gastroesophageal reflux disease)     Hyperlipidemia     Hypertension     Osteoarthritis of ankle or foot     Plantar fasciitis     Disorder of bursae and tendons in shoulder region     Tendonitis of wrist, right     Lumbosacral spondylosis without myelopathy     Lesion of lateral popliteal nerve     Vaginal vault prolapse, posthysterectomy     Cystocele with rectocele     Erosion of implanted vaginal mesh to surrounding tissue, initial encounter (H)       Current Code Status:  Prior    REVIEW OF SYSTEMS:    Review of Systems   Constitutional: Negative for fever.   Skin: Positive for wound.   Psychiatric/Behavioral: Positive for sleep disturbance (due to pain).   All other systems reviewed and are negative.      Problem List/PMH: Reviewed in EMR, and made relevant updates today.  Medications: Reviewed in EMR, and made relevant updates today.  Allergies: Reviewed in EMR, and made relevant updates today.    OBJECTIVE:                                                      /88 (BP Location: Right arm, Patient Position: Sitting, Cuff Size: Adult Regular)   Pulse 83   Temp 97  F (36.1  C) (Tympanic)   Resp 14   Ht 1.499 m (4' 11\")   Wt 68.7 kg (151 lb 6.4 oz)   LMP  (LMP Unknown)   SpO2 98%   Breastfeeding No   BMI 30.58 kg/m      Current Pain Score:   Moderate Pain (5)     Physical Exam  Vitals and nursing note " reviewed.   Constitutional:       Appearance: Normal appearance. She is obese.   HENT:      Head: Normocephalic and atraumatic.   Cardiovascular:      Rate and Rhythm: Normal rate and regular rhythm.      Heart sounds: Normal heart sounds.   Pulmonary:      Effort: Pulmonary effort is normal.      Breath sounds: Normal breath sounds.   Abdominal:      Tenderness: There is abdominal tenderness (pubis area).   Musculoskeletal:         General: Normal range of motion.   Skin:     General: Skin is warm.      Findings: Abscess and erythema present.          Neurological:      Mental Status: She is alert and oriented to person, place, and time. Mental status is at baseline.   Psychiatric:         Mood and Affect: Mood normal.         Behavior: Behavior normal.         Thought Content: Thought content normal.         Judgment: Judgment normal.          BP Readings from Last 3 Encounters:   09/08/21 136/88   07/20/21 (!) 138/90   12/02/20 (!) 132/96        Vitals:    09/08/21 1053   Weight: 68.7 kg (151 lb 6.4 oz)        The 10-year ASCVD risk score ([place] ANDREWS Jr., et al., 2013) is: 13.7%    Values used to calculate the score:      Age: 71 years      Sex: Female      Is Non- : No      Diabetic: No      Tobacco smoker: No      Systolic Blood Pressure: 136 mmHg      Is BP treated: No      HDL Cholesterol: 47 mg/dL      Total Cholesterol: 130 mg/dL    Diagnostics Completed at this Visit:    No results found for any visits on 09/08/21.     ASSESSMENT AND PLAN:    Abscess of groin  - clindamycin (CLEOCIN) 300 MG capsule  Dispense: 28 capsule; Refill: 0  - Abscess Aerobic Bacterial Culture Routine    LOCATION:      Type:  Abscess    Location:  groin/upper pubis    Universal protocol was followed. TIME OUT was conducted just prior to starting procedure confirming patient identity, site/side, procedure, patient position, and availability of correct equipment and implants.    [x] Yes  [] No    The procedure,  benefits, risks, and alternatives were explained to the [x] Patient [] Patient's Caregiver  and understanding of the information was voiced and it was agreed to proceed with the procedure.    PRE-PROCEDURE DETAILS:     Skin preparation:  Chloraprep     ANESTHESIA (see MAR for exact dosages):     Anesthesia method:  Local infiltration    Local anesthetic:  Lidocaine 1% WITH epi     PROCEDURE TYPE:     Complexity:  Simple     PROCEDURE DETAILS:     Needle aspiration: No      Incision types:  punch    Incision depth:  Dermal    Scalpel blade:  green punch    Wound management:  Probed and deloculated    Drainage:  Purulent, thick, odorous    Drainage amount:  Copious    Wound treatment:  vaseline/gauze    Packing materials:  none  Post-procedure details:      PROCEDURE   Patient Tolerance:  Patient tolerated the procedure well with no immediate complications  Describe Procedure: Copious drainage requiring two packages of gauze in total    Follow up: Wound culture obtained and sent to lab.    PATIENT INSTRUCTIONS:    General:  [x] Keep area protected from excessive sun exposure.   [x] Use sunscreen.   [x] Wear light-colored clothing to protect the area. Can also consider use of button bandages for next 6-12 months when outdoors, as the area will be more sun sensitive.  [x] Call if you notice any increase in bleeding, redness or pain over the next several days    Proper Care for Wound Site:   [x] Keep area covered for first 24 hours, then you may wash and shower as you normally would.   [x] Avoid soaking area as this can slow down healing and raise your chance of getting an infection (no hot tub or pool). Cover your wound area with a bandage or gauze with Bacitracin or Neosporin covering wound. Change dressing every 24 hours.  [x] Watch for signs of infection: surrounding redness, increased tenderness and any pus or discharge, fevers, chills. If these develop you should be seen and evaluated.   [x] Return for wound site  evaluation in 7 days.   [x] Wash daily with warm soapy water and pat dry.      LUX Reaves AGNP-C  Internal Medicine  [facility]  09/08/2021  9:22 PM            I explained my diagnostic considerations and recommendations to the patient, who voiced understanding and agreement with the treatment plan. All questions were answered. We discussed potential side effects of any prescribed or recommended therapies, as well as expectations for response to treatments.    Patient was advised to allow up to 2 days for response on lab results via MyChart and or letter to be sent.    FOLLOW-UP:    Return in about 1 week (around 9/15/2021) for wound recheck.     Clinic : [phone]  Appointment line: [phone]     LUX Reaves AGNP-C  Internal Medicine  09/08/2021 9:18 PM  _____________________________________________________________________________________    Total time spent with this patient was 28 minutes which included chart review, visualization and interpretation of labs and/or images, time spent with patient, and documentation.

## 2021-09-10 LAB — BACTERIA ABSC ANAEROBE+AEROBE CULT: NO GROWTH

## 2021-09-15 ENCOUNTER — OFFICE VISIT (OUTPATIENT)
Dept: INTERNAL MEDICINE | Facility: OTHER | Age: 71
End: 2021-09-15
Attending: NURSE PRACTITIONER
Payer: MEDICARE

## 2021-09-15 VITALS
HEART RATE: 68 BPM | TEMPERATURE: 97.6 F | RESPIRATION RATE: 14 BRPM | OXYGEN SATURATION: 98 % | WEIGHT: 153.4 LBS | SYSTOLIC BLOOD PRESSURE: 132 MMHG | DIASTOLIC BLOOD PRESSURE: 86 MMHG | BODY MASS INDEX: 30.98 KG/M2

## 2021-09-15 DIAGNOSIS — T83.711A EROSION OF IMPLANTED VAGINAL MESH TO SURROUNDING TISSUE, INITIAL ENCOUNTER (H): ICD-10-CM

## 2021-09-15 DIAGNOSIS — L02.214 ABSCESS OF GROIN: ICD-10-CM

## 2021-09-15 DIAGNOSIS — Z09 ENCOUNTER FOR FOLLOW-UP EXAMINATION: ICD-10-CM

## 2021-09-15 DIAGNOSIS — M76.892 ENTHESOPATHY OF LEFT HIP REGION: Primary | ICD-10-CM

## 2021-09-15 PROBLEM — N81.4 UTERINE PROLAPSE: Status: ACTIVE | Noted: 2021-09-15

## 2021-09-15 PROCEDURE — 99024 POSTOP FOLLOW-UP VISIT: CPT | Performed by: NURSE PRACTITIONER

## 2021-09-15 PROCEDURE — G0463 HOSPITAL OUTPT CLINIC VISIT: HCPCS

## 2021-09-15 RX ORDER — ZINC GLUCONATE 50 MG
50 TABLET ORAL DAILY
COMMUNITY

## 2021-09-15 ASSESSMENT — ENCOUNTER SYMPTOMS
FEVER: 0
ARTHRALGIAS: 1
WOUND: 1
MYALGIAS: 1

## 2021-09-15 ASSESSMENT — PAIN SCALES - GENERAL: PAINLEVEL: MILD PAIN (2)

## 2021-09-15 NOTE — PROGRESS NOTES
"Arlyn Holder  : 1950 Age: 71 year old Sex: female MRN: [id number]    CC:   Chief Complaint   Patient presents with     RECHECK     Follow up for abcess     LIYA Score:    No flowsheet data found.    PHQ-2 Score:     PHQ-2 (  [employer]) 9/15/2021 2021   Q1: Little interest or pleasure in doing things 0 0   Q2: Feeling down, depressed or hopeless 1 1   PHQ-2 Score 1 1         Tobacco Use      Smoking status: Passive Smoke Exposure - Never Smoker      Smokeless tobacco: Never Used      Tobacco comment: Quit smoking:  was a smoker    NURSE'S NOTES:  Nursing Notes:   Zhanna Hand LPN  9/15/2021 10:54 AM  Signed  Chief Complaint   Patient presents with     RECHECK     Follow up for abcess     Patient presents for follow up for abscess on abdomen; still has pulling sensation in left leg.    Initial /86 (BP Location: Right arm, Patient Position: Sitting, Cuff Size: Adult Regular)   Pulse 68   Temp 97.6  F (36.4  C) (Tympanic)   Resp 14   Wt 69.6 kg (153 lb 6.4 oz)   LMP  (LMP Unknown)   SpO2 98%   Breastfeeding No   BMI 30.98 kg/m   Estimated body mass index is 30.98 kg/m  as calculated from the following:    Height as of 21: 1.499 m (4' 11\").    Weight as of this encounter: 69.6 kg (153 lb 6.4 oz).  Medication Reconciliation: complete  FOOD SECURITY SCREENING QUESTIONS  Hunger Vital Signs:  Within the past 12 months we worried whether our food would run out before we got money to buy more. Never  Within the past 12 months the food we bought just didn't last and we didn't have money to get more. Never    Advance care plan reviewed      Zhanna Hand LPN    Nursing note reviewed with patient.  Accuracy and completeness verified.      SUBJECTIVE:                                                      HPI:   Arlyn Holder presents to the clinic today for follow up exam for a groin abscess. She was seen on 2021 for complaints of a lump in her upper mid groin area. She noticed this " on 9/5/21. She reports it is about 2 inches in diameter, is very painful to touch, warm and red, swollen. It is painful to sit and sends pain down her upper left thigh.  It was opened with a punch and drained at that appointment. Wound culture was obtained and she was treated with antibiotics.    Today she continues to complain of abdominal fullness in the lower abdomen, pulling sensation from left groin down into her left thigh, states she feels the abscess is no longer draining, tolerated antibiotics well, abscess is now size of quarter.  She continues to have pain at site.    Arlyn Holder also presents with:    Patient Active Problem List   Diagnosis     Arthritis of right wrist     Ascending aorta dilatation (H)     Enthesopathy of hip region     Carpal tunnel syndrome     Degeneration of cervical intervertebral disc     Chondromalacia of patella     GERD (gastroesophageal reflux disease)     Hyperlipidemia     Hypertension     Osteoarthritis of ankle or foot     Plantar fasciitis     Disorder of bursae and tendons in shoulder region     Tendonitis of wrist, right     Lumbosacral spondylosis without myelopathy     Lesion of lateral popliteal nerve     Vaginal vault prolapse, posthysterectomy     Cystocele with rectocele     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   Musculoskeletal: Positive for arthralgias and myalgias.   Skin: Positive for wound.   All other systems reviewed and are negative.    Problem List/PMH: Reviewed in EMR, and made relevant updates today.  Medications: Reviewed in EMR, and made relevant updates today.  Allergies: Reviewed in EMR, and made relevant updates today.    OBJECTIVE:                                                      /86 (BP Location: Right arm, Patient Position: Sitting, Cuff Size: Adult Regular)   Pulse 68   Temp  97.6  F (36.4  C) (Tympanic)   Resp 14   Wt 69.6 kg (153 lb 6.4 oz)   LMP  (LMP Unknown)   SpO2 98%   Breastfeeding No   BMI 30.98 kg/m      Current Pain Score:   Mild Pain (2)     Physical Exam  Vitals and nursing note reviewed.   Constitutional:       Appearance: Normal appearance. She is obese.   HENT:      Head: Normocephalic and atraumatic.   Cardiovascular:      Rate and Rhythm: Normal rate and regular rhythm.      Heart sounds: Normal heart sounds.   Pulmonary:      Effort: Pulmonary effort is normal.      Breath sounds: Normal breath sounds.   Abdominal:      Tenderness: There is abdominal tenderness (pubis area).   Musculoskeletal:         General: Normal range of motion.   Skin:     General: Skin is warm.      Findings: Abscess and erythema present.             Comments: Pustular abscess, not improving   Neurological:      Mental Status: She is alert and oriented to person, place, and time. Mental status is at baseline.   Psychiatric:         Mood and Affect: Mood normal.         Behavior: Behavior normal.         Thought Content: Thought content normal.         Judgment: Judgment normal.       BP Readings from Last 3 Encounters:   09/08/21 136/88   07/20/21 (!) 138/90   12/02/20 (!) 132/96      Vitals:    09/15/21 1038   Weight: 69.6 kg (153 lb 6.4 oz)        The 10-year ASCVD risk score ([place] JULIANA Jr., et al., 2013) is: 12.9%    Values used to calculate the score:      Age: 71 years      Sex: Female      Is Non- : No      Diabetic: No      Tobacco smoker: No      Systolic Blood Pressure: 132 mmHg      Is BP treated: No      HDL Cholesterol: 47 mg/dL      Total Cholesterol: 130 mg/dL    Diagnostics Completed at this Visit:    No results found for any visits on 09/15/21.     ASSESSMENT AND PLAN:    Encounter for follow-up examination   Abscess of groin  I was able to express out thick brownish discharge from abscess, area is firm to the left side approximately the size of a  quarter, painful with gentle pressure.  I discussed this with the patient and recommend that she be seen by one of our general surgeons for removal of this.  She is agreeable and referral was placed.  - CT Pelvis Soft Tissue wo Contrast  - Adult General Surg Referral    Enthesopathy of left hip region  Etiology uncertain.  Uncertain if this is the cause of that pulling sensation she has in her left groin down into her thigh.  Recommend, especially due to her mesh erosion history that we get a CT of soft tissue.  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 All questions were answered. We discussed potential side effects of any prescribed or recommended therapies, as well as expectations for response to treatments.    Patient was advised to allow up to 2 days for response on lab results via MyChart and or letter to be sent.    FOLLOW-UP:    Return if symptoms worsen or fail to improve.     Clinic : [phone]  Appointment line: [phone]     LUX Reaves, AGNP-C  Internal Medicine  09/15/2021 11:26 AM  _____________________________________________________________________________________    Total time spent with this patient was 26 minutes which included chart review, visualization and interpretation of labs and/or images, time spent with patient, and documentation.

## 2021-09-15 NOTE — NURSING NOTE
"Chief Complaint   Patient presents with     RECHECK     Follow up for abcess     Patient presents for follow up for abscess on abdomen; still has pulling sensation in left leg.    Initial /86 (BP Location: Right arm, Patient Position: Sitting, Cuff Size: Adult Regular)   Pulse 68   Temp 97.6  F (36.4  C) (Tympanic)   Resp 14   Wt 69.6 kg (153 lb 6.4 oz)   LMP  (LMP Unknown)   SpO2 98%   Breastfeeding No   BMI 30.98 kg/m   Estimated body mass index is 30.98 kg/m  as calculated from the following:    Height as of 9/8/21: 1.499 m (4' 11\").    Weight as of this encounter: 69.6 kg (153 lb 6.4 oz).  Medication Reconciliation: complete  FOOD SECURITY SCREENING QUESTIONS  Hunger Vital Signs:  Within the past 12 months we worried whether our food would run out before we got money to buy more. Never  Within the past 12 months the food we bought just didn't last and we didn't have money to get more. Never    Advance care plan reviewed      Zhanna Hand LPN    "

## 2021-09-16 ENCOUNTER — TELEPHONE (OUTPATIENT)
Dept: FAMILY MEDICINE | Facility: OTHER | Age: 71
End: 2021-09-16

## 2021-09-16 DIAGNOSIS — L02.214 ABSCESS OF GROIN: Primary | ICD-10-CM

## 2021-09-16 DIAGNOSIS — E78.00 PURE HYPERCHOLESTEROLEMIA: ICD-10-CM

## 2021-09-16 DIAGNOSIS — I10 ESSENTIAL HYPERTENSION: ICD-10-CM

## 2021-09-16 DIAGNOSIS — Z13.9 SCREENING FOR CONDITION: ICD-10-CM

## 2021-09-16 NOTE — TELEPHONE ENCOUNTER
Returned call to discuss phone call, no answer. Will call back.  Zhanna Hand LPN on 9/16/2021 at 11:59 AM

## 2021-09-20 ENCOUNTER — HOSPITAL ENCOUNTER (OUTPATIENT)
Dept: CT IMAGING | Facility: OTHER | Age: 71
End: 2021-09-20
Attending: NURSE PRACTITIONER
Payer: MEDICARE

## 2021-09-20 ENCOUNTER — LAB (OUTPATIENT)
Dept: LAB | Facility: OTHER | Age: 71
End: 2021-09-20
Attending: FAMILY MEDICINE
Payer: MEDICARE

## 2021-09-20 DIAGNOSIS — Z13.9 SCREENING FOR CONDITION: ICD-10-CM

## 2021-09-20 DIAGNOSIS — E87.6 HYPOKALEMIA: ICD-10-CM

## 2021-09-20 DIAGNOSIS — L02.214 ABSCESS OF GROIN: ICD-10-CM

## 2021-09-20 DIAGNOSIS — M76.892 ENTHESOPATHY OF LEFT HIP REGION: ICD-10-CM

## 2021-09-20 DIAGNOSIS — T83.711A EROSION OF IMPLANTED VAGINAL MESH TO SURROUNDING TISSUE, INITIAL ENCOUNTER (H): ICD-10-CM

## 2021-09-20 LAB
CREAT SERPL-MCNC: 1.05 MG/DL (ref 0.6–1.2)
GFR SERPL CREATININE-BSD FRML MDRD: 53 ML/MIN/1.73M2

## 2021-09-20 PROCEDURE — 36415 COLL VENOUS BLD VENIPUNCTURE: CPT | Mod: ZL

## 2021-09-20 PROCEDURE — 74177 CT ABD & PELVIS W/CONTRAST: CPT

## 2021-09-20 PROCEDURE — 250N000011 HC RX IP 250 OP 636: Performed by: NURSE PRACTITIONER

## 2021-09-20 PROCEDURE — 82565 ASSAY OF CREATININE: CPT | Mod: ZL

## 2021-09-20 RX ORDER — IOPAMIDOL 755 MG/ML
88 INJECTION, SOLUTION INTRAVASCULAR ONCE
Status: COMPLETED | OUTPATIENT
Start: 2021-09-20 | End: 2021-09-20

## 2021-09-20 RX ORDER — ATORVASTATIN CALCIUM 20 MG/1
20 TABLET, FILM COATED ORAL DAILY
Qty: 90 TABLET | Refills: 1 | Status: SHIPPED | OUTPATIENT
Start: 2021-09-20 | End: 2022-03-24

## 2021-09-20 RX ORDER — HYDROCHLOROTHIAZIDE 25 MG/1
25 TABLET ORAL DAILY
Qty: 90 TABLET | Refills: 1 | Status: SHIPPED | OUTPATIENT
Start: 2021-09-20 | End: 2022-01-06

## 2021-09-20 RX ADMIN — IOPAMIDOL 88 ML: 755 INJECTION, SOLUTION INTRAVENOUS at 10:31

## 2021-09-20 NOTE — TELEPHONE ENCOUNTER
Hydrochlorothiazide (HYDRODIURIL) 25 MG tablet & Atorvastatin (LIPITOR) 20 MG tablet  Last Written Prescription Date: 09/03/2020 for a year  Last Office Visit: 10/01/2020 with PCP     BP Readings from Last 3 Encounters:   09/15/21 132/86   09/08/21 136/88   07/20/21 (!) 138/90     Future Office visit:     Routing refill request to provider for review/approval because:  Failed - LDL on file in past 12 months   Recent Labs   Lab Test 09/03/20  0952   LDL 61        Unable to complete prescription refill per RNMedication Refill Policy.................... Judith Amato RN ....................  9/20/2021   10:53 AM

## 2021-09-20 NOTE — RESULT ENCOUNTER NOTE
Please call the patient with the results.  The CT does show soft tissue density focus in the area where she has the lump and discomfort, felt to be residual from the abscess.  It did indicate a cystocele and rectocele otherwise nothing abnormal.  If she continues to have pain in this area, I would recommend she be seen by one of our general surgeons for possible exploratory and excision of this area.  Encourage her to follow-up as needed.    LUX Reaves, AGNP-C  Internal Medicine

## 2021-09-21 RX ORDER — POTASSIUM CHLORIDE 1500 MG/1
20 TABLET, EXTENDED RELEASE ORAL DAILY
Qty: 90 TABLET | Refills: 3 | Status: SHIPPED | OUTPATIENT
Start: 2021-09-21 | End: 2022-05-06

## 2021-09-21 NOTE — TELEPHONE ENCOUNTER
potassium chloride ER (KLOR-CON M) 20 MEQ CR tablet     Sig: Take 1 tablet (20 mEq) by mouth daily           Last Written Prescription Date:  6/30/21  Last Fill Quantity: 90,   # refills: 0  Last Office Visit: 9/15/21  Future Office visit:       Routing refill request to provider for review/approval because:  Drug not on the [facility], P or [facility] refill protocol or controlled substance Wanda Ontiveros RN on 9/21/2021 at 2:41 PM

## 2021-09-22 ENCOUNTER — TELEPHONE (OUTPATIENT)
Dept: FAMILY MEDICINE | Facility: OTHER | Age: 71
End: 2021-09-22

## 2021-09-22 NOTE — TELEPHONE ENCOUNTER
Juanita MERCER Called and patient is calling back as waiting on test results she is aval. For a phone call now.

## 2021-09-22 NOTE — TELEPHONE ENCOUNTER
Patient had questions and was inquiring about future procedure.    Zhanna Hand LPN on 9/22/2021 at 3:40 PM

## 2021-09-28 ENCOUNTER — OFFICE VISIT (OUTPATIENT)
Dept: SURGERY | Facility: OTHER | Age: 71
End: 2021-09-28
Attending: NURSE PRACTITIONER
Payer: MEDICARE

## 2021-09-28 VITALS
DIASTOLIC BLOOD PRESSURE: 70 MMHG | HEIGHT: 58 IN | RESPIRATION RATE: 16 BRPM | OXYGEN SATURATION: 100 % | WEIGHT: 151.2 LBS | HEART RATE: 84 BPM | TEMPERATURE: 97.4 F | BODY MASS INDEX: 31.74 KG/M2 | SYSTOLIC BLOOD PRESSURE: 142 MMHG

## 2021-09-28 DIAGNOSIS — L02.214 ABSCESS OF GROIN: ICD-10-CM

## 2021-09-28 PROCEDURE — 12032 INTMD RPR S/A/T/EXT 2.6-7.5: CPT | Performed by: SURGERY

## 2021-09-28 PROCEDURE — 11401 EXC TR-EXT B9+MARG 0.6-1 CM: CPT | Performed by: SURGERY

## 2021-09-28 PROCEDURE — G0463 HOSPITAL OUTPT CLINIC VISIT: HCPCS

## 2021-09-28 PROCEDURE — 88304 TISSUE EXAM BY PATHOLOGIST: CPT

## 2021-09-28 ASSESSMENT — PAIN SCALES - GENERAL: PAINLEVEL: NO PAIN (0)

## 2021-09-28 ASSESSMENT — MIFFLIN-ST. JEOR: SCORE: 1080.59

## 2021-09-28 NOTE — PROGRESS NOTES
Procedure Note      Pre/Post Operative Diagnosis:   Left groin infected skin cyst.     Procedure:   Removal of left groin cyst    Surgeon: Ashish Quigley MD    Local Anesthesia: 1% lidocaine with 0.25% Marcaine with epinephrine    Indication for the procedure:  This is a 71 year old female  patient referred by Lisa Palm NP with a left groin cyst.       After explaining the risks to include bleeding, infection, recurrence or need for reexcision, and scarring the patient wished to proceed.    Procedure:   The area was prepped and draped in usual sterile fashion with ChloraPrep.   After, adequate local anesthesia, an 3 cm X 1 cm elliptical skin incision was made to encompass the 1cm lesion.  The deep tissues were closed with 3-0 Vicryl.  The skin was closed with 4-0 Monocryl and Dermabond.      Plan:  The patient will be called to review the pathology. Patient will follow up if there any problems with the wound including redness or drainage.      Ashish Quigley MD....................  9/28/2021   4:57 PM

## 2021-09-28 NOTE — NURSING NOTE
"Chief Complaint   Patient presents with     Procedure     abcess/cyst of groin.       Initial BP (!) 146/76 (BP Location: Right arm, Patient Position: Sitting, Cuff Size: Adult Regular)   Pulse 84   Temp 97.4  F (36.3  C)   Resp 16   Ht 1.473 m (4' 10\")   Wt 68.6 kg (151 lb 3.2 oz)   LMP  (LMP Unknown)   SpO2 100%   Breastfeeding No   BMI 31.60 kg/m   Estimated body mass index is 31.6 kg/m  as calculated from the following:    Height as of this encounter: 1.473 m (4' 10\").    Weight as of this encounter: 68.6 kg (151 lb 3.2 oz).  Medication Reconciliation: Completed     Advanced Care Directive Reviewed    Prior to the start of the procedure and with procedural staff participation, I verbally confirmed the patient s identity using two indicators, relevant allergies, that the procedure was appropriate and matched the consent or emergent situation, and that the correct equipment/implants were available. Immediately prior to starting the procedure I conducted the Time Out with the procedural staff and re-confirmed the patient s name, procedure, and site/side. (The Joint Commission universal protocol was followed.)  Yes    Sedation (Moderate or Deep): None      Elva Lazcano LPN  "

## 2021-10-01 LAB
PATH REPORT.COMMENTS IMP SPEC: NORMAL
PATH REPORT.FINAL DX SPEC: NORMAL
PHOTO IMAGE: NORMAL

## 2021-10-05 ENCOUNTER — TELEPHONE (OUTPATIENT)
Dept: SURGERY | Facility: OTHER | Age: 71
End: 2021-10-05

## 2022-01-06 DIAGNOSIS — I10 ESSENTIAL HYPERTENSION: ICD-10-CM

## 2022-01-06 RX ORDER — HYDROCHLOROTHIAZIDE 25 MG/1
25 TABLET ORAL DAILY
Qty: 90 TABLET | Refills: 0 | Status: SHIPPED | OUTPATIENT
Start: 2022-01-06 | End: 2022-03-29

## 2022-01-06 NOTE — TELEPHONE ENCOUNTER
"TW #[id number] sent Rx request for the following:      HYDROCHLOROTHIAZIDE 25MG TAB  Sig: TAKE 1 TABLET (25 MG) BY MOUTH DAILY      Last Prescription Date:   9/20/2021  Last Fill Qty/Refills:         90, R-1    Last Office Visit:              9/15/2021   Future Office visit:           none    Requested Prescriptions   Pending Prescriptions Disp Refills     hydrochlorothiazide (HYDRODIURIL) 25 MG tablet [Pharmacy Med Name: HYDROCHLOROTHIAZIDE 25MG TAB] 90 tablet 0     Sig: TAKE 1 TABLET (25 MG) BY MOUTH DAILY       Diuretics (Including Combos) Protocol Failed - 1/6/2022 12:52 PM        Failed - Blood pressure under 140/90 in past 12 months     BP Readings from Last 3 Encounters:   09/28/21 (!) 142/70   09/15/21 132/86   09/08/21 136/88                 Failed - Normal serum potassium on file in past 12 months     Recent Labs   Lab Test 10/01/20  1157   POTASSIUM 3.8                    Failed - Normal serum sodium on file in past 12 months     Recent Labs   Lab Test 10/01/20  1157                 Passed - Recent (12 mo) or future (30 days) visit within the authorizing provider's specialty     Patient has had an office visit with the authorizing provider or a provider within the authorizing providers department within the previous 12 mos or has a future within next 30 days. See \"Patient Info\" tab in inbasket, or \"Choose Columns\" in Meds & Orders section of the refill encounter.              Passed - Medication is active on med list        Passed - Patient is age 18 or older        Passed - No active pregancy on record        Passed - Normal serum creatinine on file in past 12 months     Recent Labs   Lab Test 09/20/21  0756   CR 1.05              Passed - No positive pregnancy test in past 12 months           Unable to complete prescription refill per RN Medication Refill Policy.................... Jerry Obrien RN ....................  1/6/2022   2:37 PM          "

## 2022-03-24 DIAGNOSIS — E78.00 PURE HYPERCHOLESTEROLEMIA: ICD-10-CM

## 2022-03-24 RX ORDER — ATORVASTATIN CALCIUM 20 MG/1
20 TABLET, FILM COATED ORAL DAILY
Qty: 90 TABLET | Refills: 0 | Status: SHIPPED | OUTPATIENT
Start: 2022-03-24 | End: 2022-04-08

## 2022-03-24 NOTE — TELEPHONE ENCOUNTER
"Brooke Castaneda  Letter sent for patient to schedule an appointment.   Dana Ventura RN on 3/24/2022 at 10:28 AM    Last Prescription Date: 9/20/21  Last Qty/Refills: 90 / 1  Last Office Visit: 10/1/2020  Future Office Visit: none     Requested Prescriptions   Pending Prescriptions Disp Refills     atorvastatin (LIPITOR) 20 MG tablet [Pharmacy Med Name: ATORVASTATIN 20MG TABLET] 90 tablet 1     Sig: TAKE 1 TABLET (20 MG) BY MOUTH DAILY       Statins Protocol Failed - 3/24/2022  1:04 AM        Failed - LDL on file in past 12 months     Recent Labs   Lab Test 09/03/20  0952   LDL 61             Passed - No abnormal creatine kinase in past 12 months     No lab results found.           Passed - Recent (12 mo) or future (30 days) visit within the authorizing provider's specialty     Patient has had an office visit with the authorizing provider or a provider within the authorizing providers department within the previous 12 mos or has a future within next 30 days. See \"Patient Info\" tab in inbasket, or \"Choose Columns\" in Meds & Orders section of the refill encounter.              Passed - Medication is active on med list        Passed - Patient is age 18 or older        Passed - No active pregnancy on record        Passed - No positive pregnancy test in past 12 months             "

## 2022-04-08 DIAGNOSIS — E78.00 PURE HYPERCHOLESTEROLEMIA: ICD-10-CM

## 2022-04-08 DIAGNOSIS — I10 ESSENTIAL HYPERTENSION: ICD-10-CM

## 2022-04-08 RX ORDER — ATORVASTATIN CALCIUM 20 MG/1
20 TABLET, FILM COATED ORAL DAILY
Qty: 90 TABLET | Refills: 0 | Status: SHIPPED | OUTPATIENT
Start: 2022-04-08 | End: 2022-05-06

## 2022-04-08 RX ORDER — HYDROCHLOROTHIAZIDE 25 MG/1
25 TABLET ORAL DAILY
Qty: 90 TABLET | Refills: 0 | Status: SHIPPED | OUTPATIENT
Start: 2022-04-08 | End: 2022-05-06

## 2022-04-08 NOTE — TELEPHONE ENCOUNTER
[facility] #[id number] sent Rx request for the following:      HYDROCHLOROTHIAZIDE 25MG TAB      Last Prescription Date:   3/29/2022  Last Fill Qty/Refills:         90, R-0    Last Office Visit:              9/15/2021   Future Office visit:           none       ATORVASTATIN 20MG TABLET      Last Prescription Date:   3/24/2022  Last Fill Qty/Refills:         90, R-0        Jerry Obrien RN, BSN  ....................  4/8/2022   2:31 PM

## 2022-04-11 ENCOUNTER — HOSPITAL ENCOUNTER (EMERGENCY)
Facility: OTHER | Age: 72
Discharge: HOME | End: 2022-04-11
Attending: PHYSICIAN ASSISTANT | Admitting: PHYSICIAN ASSISTANT
Payer: MEDICARE

## 2022-04-11 VITALS
WEIGHT: 151 LBS | BODY MASS INDEX: 31.56 KG/M2 | DIASTOLIC BLOOD PRESSURE: 88 MMHG | RESPIRATION RATE: 18 BRPM | OXYGEN SATURATION: 99 % | HEART RATE: 78 BPM | SYSTOLIC BLOOD PRESSURE: 164 MMHG | TEMPERATURE: 98.1 F

## 2022-04-11 DIAGNOSIS — E86.0 DEHYDRATION: ICD-10-CM

## 2022-04-11 DIAGNOSIS — R19.5 LOOSE STOOLS: ICD-10-CM

## 2022-04-11 DIAGNOSIS — E87.6 HYPOKALEMIA: ICD-10-CM

## 2022-04-11 LAB
ALBUMIN SERPL-MCNC: 4.3 G/DL (ref 3.5–5.7)
ALBUMIN UR-MCNC: NEGATIVE MG/DL
ALP SERPL-CCNC: 54 U/L (ref 34–104)
ALT SERPL W P-5'-P-CCNC: 19 U/L (ref 7–52)
ANION GAP SERPL CALCULATED.3IONS-SCNC: 11 MMOL/L (ref 3–14)
APPEARANCE UR: CLEAR
AST SERPL W P-5'-P-CCNC: 19 U/L (ref 13–39)
BASOPHILS # BLD AUTO: 0 10E3/UL (ref 0–0.2)
BASOPHILS NFR BLD AUTO: 1 %
BILIRUB SERPL-MCNC: 0.8 MG/DL (ref 0.3–1)
BILIRUB UR QL STRIP: NEGATIVE
BUN SERPL-MCNC: 19 MG/DL (ref 7–25)
CALCIUM SERPL-MCNC: 9.5 MG/DL (ref 8.6–10.3)
CHLORIDE BLD-SCNC: 99 MMOL/L (ref 98–107)
CO2 SERPL-SCNC: 27 MMOL/L (ref 21–31)
COLOR UR AUTO: ABNORMAL
CREAT SERPL-MCNC: 0.82 MG/DL (ref 0.6–1.2)
CRP SERPL-MCNC: 91.8 MG/L
EOSINOPHIL # BLD AUTO: 0.2 10E3/UL (ref 0–0.7)
EOSINOPHIL NFR BLD AUTO: 2 %
ERYTHROCYTE [DISTWIDTH] IN BLOOD BY AUTOMATED COUNT: 13.4 % (ref 10–15)
ERYTHROCYTE [SEDIMENTATION RATE] IN BLOOD BY WESTERGREN METHOD: 19 MM/HR (ref 0–30)
FLUAV RNA SPEC QL NAA+PROBE: NEGATIVE
FLUBV RNA RESP QL NAA+PROBE: NEGATIVE
GFR SERPL CREATININE-BSD FRML MDRD: 75 ML/MIN/1.73M2
GLUCOSE BLD-MCNC: 97 MG/DL (ref 70–105)
GLUCOSE UR STRIP-MCNC: NEGATIVE MG/DL
HCT VFR BLD AUTO: 41.8 % (ref 35–47)
HGB BLD-MCNC: 14.2 G/DL (ref 11.7–15.7)
HGB UR QL STRIP: NEGATIVE
IMM GRANULOCYTES # BLD: 0 10E3/UL
IMM GRANULOCYTES NFR BLD: 0 %
KETONES UR STRIP-MCNC: ABNORMAL MG/DL
LACTATE SERPL-SCNC: 1 MMOL/L (ref 0.7–2)
LEUKOCYTE ESTERASE UR QL STRIP: NEGATIVE
LYMPHOCYTES # BLD AUTO: 2 10E3/UL (ref 0.8–5.3)
LYMPHOCYTES NFR BLD AUTO: 22 %
MAGNESIUM SERPL-MCNC: 2 MG/DL (ref 1.9–2.7)
MCH RBC QN AUTO: 30.2 PG (ref 26.5–33)
MCHC RBC AUTO-ENTMCNC: 34 G/DL (ref 31.5–36.5)
MCV RBC AUTO: 89 FL (ref 78–100)
MONOCYTES # BLD AUTO: 1.3 10E3/UL (ref 0–1.3)
MONOCYTES NFR BLD AUTO: 14 %
NEUTROPHILS # BLD AUTO: 5.4 10E3/UL (ref 1.6–8.3)
NEUTROPHILS NFR BLD AUTO: 61 %
NITRATE UR QL: NEGATIVE
NRBC # BLD AUTO: 0 10E3/UL
NRBC BLD AUTO-RTO: 0 /100
PH UR STRIP: 6 [PH] (ref 5–9)
PLATELET # BLD AUTO: 242 10E3/UL (ref 150–450)
POTASSIUM BLD-SCNC: 3 MMOL/L (ref 3.5–5.1)
PROT SERPL-MCNC: 7.4 G/DL (ref 6.4–8.9)
RBC # BLD AUTO: 4.7 10E6/UL (ref 3.8–5.2)
RSV RNA SPEC NAA+PROBE: NEGATIVE
SARS-COV-2 RNA RESP QL NAA+PROBE: NEGATIVE
SODIUM SERPL-SCNC: 137 MMOL/L (ref 134–144)
SP GR UR STRIP: 1.02 (ref 1–1.03)
UROBILINOGEN UR STRIP-MCNC: NORMAL MG/DL
WBC # BLD AUTO: 8.9 10E3/UL (ref 4–11)

## 2022-04-11 PROCEDURE — 87040 BLOOD CULTURE FOR BACTERIA: CPT | Performed by: PHYSICIAN ASSISTANT

## 2022-04-11 PROCEDURE — 250N000013 HC RX MED GY IP 250 OP 250 PS 637: Performed by: PHYSICIAN ASSISTANT

## 2022-04-11 PROCEDURE — 86140 C-REACTIVE PROTEIN: CPT | Performed by: PHYSICIAN ASSISTANT

## 2022-04-11 PROCEDURE — 99283 EMERGENCY DEPT VISIT LOW MDM: CPT | Mod: CS | Performed by: PHYSICIAN ASSISTANT

## 2022-04-11 PROCEDURE — 83735 ASSAY OF MAGNESIUM: CPT | Performed by: PHYSICIAN ASSISTANT

## 2022-04-11 PROCEDURE — 85652 RBC SED RATE AUTOMATED: CPT | Performed by: PHYSICIAN ASSISTANT

## 2022-04-11 PROCEDURE — 81003 URINALYSIS AUTO W/O SCOPE: CPT | Performed by: PHYSICIAN ASSISTANT

## 2022-04-11 PROCEDURE — 80053 COMPREHEN METABOLIC PANEL: CPT | Performed by: PHYSICIAN ASSISTANT

## 2022-04-11 PROCEDURE — 83605 ASSAY OF LACTIC ACID: CPT | Performed by: PHYSICIAN ASSISTANT

## 2022-04-11 PROCEDURE — C9803 HOPD COVID-19 SPEC COLLECT: HCPCS | Performed by: PHYSICIAN ASSISTANT

## 2022-04-11 PROCEDURE — 85025 COMPLETE CBC W/AUTO DIFF WBC: CPT | Performed by: PHYSICIAN ASSISTANT

## 2022-04-11 PROCEDURE — 99283 EMERGENCY DEPT VISIT LOW MDM: CPT | Performed by: PHYSICIAN ASSISTANT

## 2022-04-11 PROCEDURE — 87637 SARSCOV2&INF A&B&RSV AMP PRB: CPT | Performed by: PHYSICIAN ASSISTANT

## 2022-04-11 PROCEDURE — 36415 COLL VENOUS BLD VENIPUNCTURE: CPT | Performed by: PHYSICIAN ASSISTANT

## 2022-04-11 RX ORDER — POTASSIUM CHLORIDE 20MEQ/15ML
40 LIQUID (ML) ORAL ONCE
Status: COMPLETED | OUTPATIENT
Start: 2022-04-11 | End: 2022-04-11

## 2022-04-11 RX ADMIN — POTASSIUM CHLORIDE 40 MEQ: 1.5 SOLUTION ORAL at 15:33

## 2022-04-11 ASSESSMENT — ENCOUNTER SYMPTOMS
AGITATION: 0
NAUSEA: 0
WEAKNESS: 1
WHEEZING: 0
BACK PAIN: 0
FEVER: 0
STRIDOR: 0
FATIGUE: 1
DIARRHEA: 1
FREQUENCY: 1
FACIAL SWELLING: 0
TREMORS: 0
ABDOMINAL PAIN: 1
EYE PAIN: 0
CHILLS: 1
SEIZURES: 0
NECK PAIN: 0
VOMITING: 0
FLANK PAIN: 0
SORE THROAT: 0

## 2022-04-11 NOTE — ED PROVIDER NOTES
"  History     Chief Complaint   Patient presents with     Rule out Urinary Tract Infection     HPI  Arlyn Holder is a 71 year old female who reports that she thinks she may have a UTI.  She reports that last month she had a dental cavity and she was actually started on 12 days worth of Augmentin.  She has completed this at this time.  She states that over the weekend she had increased urinary frequency and urgency.  She has been having some small BMs with urination.  She was at a tournament and when she climbed up to the top of the bleachers she felt very lightheaded.  She states \"I feel a little off\".  She has had some lower abdominal pain and chills.  She states her stools have been \"yellow and slimy at times\".  Denies any abdominal pain today.   She did not get her COVID vaccination series.  She talked to her daughter who is an RN and she is concerned about \"sepsis.\"    Allergies:  Allergies   Allergen Reactions     Codeine Nausea and Vomiting     Diazepam      Other reaction(s): Hallucinations     Naproxen Other (See Comments)     Mouth sores     Sulfa Drugs Unknown     Chlorhexidine Rash     Sulfacetamide Rash     Had a reaction to sulfa drug but is not sure which one it was-states the reaction resulted in redness       Problem List:    Patient Active Problem List    Diagnosis Date Noted     Abscess of groin 09/15/2021     Priority: Medium     Encounter for follow-up examination 09/15/2021     Priority: Medium     Uterine prolapse 09/15/2021     Priority: Medium     Formatting of this note might be different from the original.  preloader comment: selections available to preload differs from paper chart.    *Pelvic floor relaxation       Vaginal vault prolapse, posthysterectomy 09/14/2020     Priority: Medium     Added automatically from request for surgery [id number]       Cystocele with rectocele 09/14/2020     Priority: Medium     Added automatically from request for surgery [id number]       Erosion of implanted " vaginal mesh to surrounding tissue, initial encounter (H) 09/14/2020     Priority: Medium     Added automatically from request for surgery [id number]       Enthesopathy of hip region 02/15/2018     Priority: Medium     Degeneration of cervical intervertebral disc 02/15/2018     Priority: Medium     Overview:   Multilevel degenerative disk disease in cervical lumbar spine without   radiculopathy.  Add also possible right cubital tunnel syndrome at elbow.    Formatting of this note might be different from the original.  Multilevel degenerative disk disease in cervical lumbar spine without   radiculopathy.  Add also possible right cubital tunnel syndrome at elbow.       Chondromalacia of patella 02/15/2018     Priority: Medium     Hypertension 02/15/2018     Priority: Medium     Symptomatic menopausal or female climacteric states 02/15/2018     Priority: Medium     Overview:   Postmenopausal now off hormone replacement therapy.    Formatting of this note might be different from the original.  Postmenopausal now off hormone replacement therapy.       Other joint derangement, not elsewhere classified, unspecified site 02/15/2018     Priority: Medium     Overview:   preloader comment: selections available to preload differs from paper chart.    *Subluxation L3-L4 with mild stenosis    Formatting of this note might be different from the original.  preloader comment: selections available to preload differs from paper chart.    *Subluxation L3-L4 with mild stenosis       Ascending aorta dilatation (H) 03/23/2016     Priority: Medium     Overview:   3.5-3.6 cm on CT 3/2016 - follow up in 1 year    Formatting of this note might be different from the original.  3.5-3.6 cm on CT 3/2016 - follow up in 1 year       Arthritis of right wrist 11/07/2013     Priority: Medium     GERD (gastroesophageal reflux disease) 12/03/2012     Priority: Medium     Tendonitis of wrist, right 12/03/2012     Priority: Medium     Hyperlipidemia  10/04/2011     Priority: Medium     Osteoarthritis of ankle or foot 10/04/2011     Priority: Medium     Plantar fasciitis 10/04/2011     Priority: Medium     Carpal tunnel syndrome 11/18/2010     Priority: Medium     Disorder of bursae and tendons in shoulder region 11/18/2010     Priority: Medium     Lesion of lateral popliteal nerve 10/11/2006     Priority: Medium     Lumbosacral spondylosis without myelopathy 10/04/2006     Priority: Medium        Past Medical History:    Past Medical History:   Diagnosis Date     AC (acromioclavicular) arthritis 3/1/2018     Bunion of great toe of right foot      Carpal tunnel syndrome      Complete tear of right rotator cuff 3/1/2018     Essential (primary) hypertension      Gastro-esophageal reflux disease without esophagitis      Hyperlipidemia      Impingement syndrome of right shoulder      Personal history of other medical treatment (CODE)      S/P arthroscopy of right shoulder 4/11/2018     Thoracic aortic ectasia (H)      Uterovaginal prolapse        Past Surgical History:    Past Surgical History:   Procedure Laterality Date     ARTHROSCOPY SHOULDER ROTATOR CUFF REPAIR Right 4/11/2018    Procedure: ARTHROSCOPY SHOULDER ROTATOR CUFF REPAIR;  Right Shoulder Arthroscopy with Subacromial Decompression, Distal Clavicle Excision, Rotator Cuff Repair, Biceps Tenotomy, Extensive Debridement;  Surgeon: Wilner Painting DO;  Location: [facility] OR     AS SHOULDER ARTHROSCOPY, DX Right 4/11/18    Dr. Painting     BUNIONECTOMY      1999,right     COLONOSCOPY      2004, normal     COLONOSCOPY  03/16/2015    Normal exam, F/U 2025     COLPORRHAPHY ANTERIOR, POSTERIOR, COMBINED N/A 10/19/2020    Procedure: COLPORRHAPHY, COMBINED ANTEROPOSTERIOR, ENTEROCELE REPAIR, COLPOCLIESIS, Repair eroded mesh;  Surgeon: Wade Reyna MD;  Location:  OR     CYSTOSCOPY N/A 10/19/2020    Procedure: CYSTOSCOPY;  Surgeon: Wade Reyna MD;  Location: [facility] OR     HYSTERECTOMY VAGINAL      1982,for  uterine prolapse with A&P repair.  Tube and ovaries still in place.     LAPAROSCOPY DIAGNOSTIC (GYN)      1989,for pelvic adhesion     LAPAROSCOPY DIAGNOSTIC (GYN)      11/08,vaginal vault prolapse - Dr. Mumtaz Marshall - anterior/posterior repair with bladder mesh.     RELEASE CARPAL TUNNEL      Dr. Méndez     S/P RIGHT SHOULDER ARTHROSCOPY Right 04/11/2018       Family History:    Family History   Problem Relation Age of Onset     Diabetes Father         Diabetes     Hypertension Father         Hypertension     Heart Disease Father         Heart Disease     Other - See Comments Father          hearing loss, kidney problems.     Heart Disease Mother         Heart Disease     Hypertension Mother         Hypertension     Cancer Maternal Grandmother         Cancer,bone cancer in her leg     Other - See Comments Maternal Grandmother         Psychiatric illness,depression     Heart Disease Son         Heart Disease,MI - first at age 48     Breast Cancer Sister         Cancer-breast     Diabetes Sister         Diabetes     Heart Disease Sister         Heart Disease,angioplasty CAD     Other - See Comments Sister         Right ear deafness - surgically repaired     Other - See Comments Sister         depression     Diabetes Sister         Diabetes     Hypertension Sister         Hypertension     Other - See Comments Sister         car accident age 75 with multiple fractures - in nursing home.     Heart Disease Brother         Heart Disease     Heart Disease Brother         Heart Disease     Hypertension Brother         Hypertension     Other - See Comments Brother         vertigo, also kidney problems.       Social History:  Marital Status:   [2]  Social History     Tobacco Use     Smoking status: Passive Smoke Exposure - Never Smoker     Smokeless tobacco: Never Used     Tobacco comment: Quit smoking:  was a smoker   Vaping Use     Vaping Use: Never used   Substance Use Topics     Alcohol use: No      Alcohol/week: 0.0 standard drinks     Drug use: No        Medications:    potassium chloride ER (KLOR-CON M) 20 MEQ CR tablet  aspirin (ASPIRIN LOW DOSE) 81 MG tablet  atorvastatin (LIPITOR) 20 MG tablet  azithromycin (ZITHROMAX) 250 MG tablet  Cyanocobalamin (VITAMIN B-12 CR) 1000 MCG TBCR  cyclobenzaprine (FLEXERIL) 10 MG tablet  hydrochlorothiazide (HYDRODIURIL) 25 MG tablet  lecithin 400 MG CAPS  Multiple Vitamins-Minerals (MENS MULTIVITAMIN PLUS) TABS  triamcinolone (KENALOG) 0.1 % external cream  vitamin E (TOCOPHEROL) 100 UNIT capsule  zinc gluconate 50 MG tablet          Review of Systems   Constitutional: Positive for chills and fatigue. Negative for fever.   HENT: Negative for facial swelling and sore throat.    Eyes: Negative for pain.   Respiratory: Negative for wheezing and stridor.    Cardiovascular: Negative for chest pain.   Gastrointestinal: Positive for abdominal pain and diarrhea. Negative for nausea and vomiting.   Genitourinary: Positive for frequency and urgency. Negative for flank pain.   Musculoskeletal: Negative for back pain and neck pain.   Skin: Negative for pallor.   Neurological: Positive for weakness. Negative for tremors and seizures.   Psychiatric/Behavioral: Negative for agitation.   All other systems reviewed and are negative.      Physical Exam   BP: (!) 164/88  Pulse: 78  Temp: 98.1  F (36.7  C)  Resp: 18  Weight: 68.5 kg (151 lb)  SpO2: 99 %      Physical Exam  Vitals and nursing note reviewed.   Constitutional:       General: She is not in acute distress.     Appearance: Normal appearance. She is not ill-appearing or toxic-appearing.   HENT:      Head: Normocephalic. No raccoon eyes, right periorbital erythema or left periorbital erythema.      Right Ear: No drainage or tenderness.      Left Ear: No drainage or tenderness.      Nose: Nose normal.   Eyes:      General: Lids are normal. Gaze aligned appropriately. No scleral icterus.     Extraocular Movements: Extraocular  movements intact.   Neck:      Trachea: No tracheal deviation.   Cardiovascular:      Rate and Rhythm: Normal rate.   Pulmonary:      Effort: Pulmonary effort is normal. No respiratory distress.      Breath sounds: No stridor.      Comments: Lung sounds are clear.  SaO2 is 99% on room air.  She is not appear to be in any respiratory distress.  No tachypnea.  Abdominal:      General: Bowel sounds are normal. There is no distension.      Palpations: Abdomen is soft.      Tenderness: There is no abdominal tenderness.   Musculoskeletal:         General: No deformity or signs of injury. Normal range of motion.      Cervical back: Normal range of motion. No signs of trauma.   Skin:     General: Skin is warm and dry.      Coloration: Skin is not jaundiced or pale.   Neurological:      General: No focal deficit present.      Mental Status: She is alert and oriented to person, place, and time.      GCS: GCS eye subscore is 4. GCS verbal subscore is 5. GCS motor subscore is 6.      Motor: No tremor or seizure activity.   Psychiatric:         Attention and Perception: Attention normal.         Mood and Affect: Mood normal.         ED Course              ED Course as of 04/11/22 2117 Mon Apr 11, 2022   1539 Potassium(!): 3.0   2112 Platelet Count: 242   2113 Absolute Immature Granulocytes: 0.0   2114 CRP Inflammation(!): 91.8   2115 Hemoglobin: 14.2   2115 RBC Count: 4.70       Results for orders placed or performed during the hospital encounter of 04/11/22 (from the past 24 hour(s))   UA reflex to Microscopic   Result Value Ref Range    Color Urine Light Yellow Colorless, Straw, Light Yellow, Yellow    Appearance Urine Clear Clear    Glucose Urine Negative Negative mg/dL    Bilirubin Urine Negative Negative    Ketones Urine Trace (A) Negative mg/dL    Specific Gravity Urine 1.017 1.000 - 1.030    Blood Urine Negative Negative    pH Urine 6.0 5.0 - 9.0    Protein Albumin Urine Negative Negative mg/dL    Urobilinogen Urine  Normal Normal, 2.0 mg/dL    Nitrite Urine Negative Negative    Leukocyte Esterase Urine Negative Negative    Narrative    Microscopic not indicated   CBC with platelets differential    Narrative    The following orders were created for panel order CBC with platelets differential.  Procedure                               Abnormality         Status                     ---------                               -----------         ------                     CBC with platelets and d...[[id number]]                      Final result                 Please view results for these tests on the individual orders.   Comprehensive metabolic panel   Result Value Ref Range    Sodium 137 134 - 144 mmol/L    Potassium 3.0 (L) 3.5 - 5.1 mmol/L    Chloride 99 98 - 107 mmol/L    Carbon Dioxide (CO2) 27 21 - 31 mmol/L    Anion Gap 11 3 - 14 mmol/L    Urea Nitrogen 19 7 - 25 mg/dL    Creatinine 0.82 0.60 - 1.20 mg/dL    Calcium 9.5 8.6 - 10.3 mg/dL    Glucose 97 70 - 105 mg/dL    Alkaline Phosphatase 54 34 - 104 U/L    AST 19 13 - 39 U/L    ALT 19 7 - 52 U/L    Protein Total 7.4 6.4 - 8.9 g/dL    Albumin 4.3 3.5 - 5.7 g/dL    Bilirubin Total 0.8 0.3 - 1.0 mg/dL    GFR Estimate 75 >60 mL/min/1.73m2   Lactic acid whole blood   Result Value Ref Range    Lactic Acid 1.0 0.7 - 2.0 mmol/L   CRP inflammation   Result Value Ref Range    CRP Inflammation 91.8 (H) <10.0 mg/L   Erythrocyte sedimentation rate auto   Result Value Ref Range    Erythrocyte Sedimentation Rate 19 0 - 30 mm/hr   Magnesium   Result Value Ref Range    Magnesium 2.0 1.9 - 2.7 mg/dL   CBC with platelets and differential   Result Value Ref Range    WBC Count 8.9 4.0 - 11.0 10e3/uL    RBC Count 4.70 3.80 - 5.20 10e6/uL    Hemoglobin 14.2 11.7 - 15.7 g/dL    Hematocrit 41.8 35.0 - 47.0 %    MCV 89 78 - 100 fL    MCH 30.2 26.5 - 33.0 pg    MCHC 34.0 31.5 - 36.5 g/dL    RDW 13.4 10.0 - 15.0 %    Platelet Count 242 150 - 450 10e3/uL    % Neutrophils 61 %    % Lymphocytes 22 %    %  Monocytes 14 %    % Eosinophils 2 %    % Basophils 1 %    % Immature Granulocytes 0 %    NRBCs per 100 WBC 0 <1 /100    Absolute Neutrophils 5.4 1.6 - 8.3 10e3/uL    Absolute Lymphocytes 2.0 0.8 - 5.3 10e3/uL    Absolute Monocytes 1.3 0.0 - 1.3 10e3/uL    Absolute Eosinophils 0.2 0.0 - 0.7 10e3/uL    Absolute Basophils 0.0 0.0 - 0.2 10e3/uL    Absolute Immature Granulocytes 0.0 <=0.4 10e3/uL    Absolute NRBCs 0.0 10e3/uL   Asymptomatic Influenza A/B & SARS-CoV2 (COVID-19) Virus PCR Multiplex Nose    Specimen: Nose; Swab   Result Value Ref Range    Influenza A PCR Negative Negative    Influenza B PCR Negative Negative    RSV PCR Negative Negative    SARS CoV2 PCR Negative Negative    Narrative    Testing was performed using the Xpert Xpress CoV2/Flu/RSV Assay on the [employer] GeneXpert Instrument. This test should be ordered for the detection of SARS-CoV-2 and influenza viruses in individuals who meet clinical and/or epidemiological criteria. Test performance is unknown in asymptomatic patients. This test is for in vitro diagnostic use under the FDA EUA for laboratories certified under CLIA to perform high or moderate complexity testing. This test has not been FDA cleared or approved. A negative result does not rule out the presence of PCR inhibitors in the specimen or target RNA in concentration below the limit of detection for the assay. If only one viral target is positive but coinfection with multiple targets is suspected, the sample should be re-tested with another FDA cleared, approved, or authorized test, if coinfection would change clinical management. This test was validated by the [facility]. These laboratories are certified under the Clinical  Laboratory Improvement Amendments of 1988 (CLIA-88) as qualified to perform high complexity laboratory testing.       Medications   potassium chloride (KAYCIEL) solution 40 mEq (40 mEq Oral Given 4/11/22 1533)       Assessments & Plan (with Medical  Decision Making)     I have reviewed the nursing notes.    I have reviewed the findings, diagnosis, plan and need for follow up with the patient.      Discharge Medication List as of 4/11/2022  3:53 PM          Final diagnoses:   Hypokalemia   Loose stools   Dehydration     Afebrile.  Vital signs stable.  Patient with some loose stools and yellowish discharge after being on Augmentin for dental abscess and dental cavities.  Some increasing weakness and fatigue this weekend as well.  She has had some increased urinary urgency and frequency and concerns for UTI.  Her UA shows no signs of hematuria or urinary tract infection.  She has a trace amount of ketones in the urine.  CBC showed normal white blood cells no left shift.  Lactic acid is normal.  Her CMP shows minimal decrease in potassium at 3.0 and she was given oral replacement.  Blood cultures are pending.  CRP is elevated at 91.8.  But ESR remains normal.  Magnesium is normal.  Stool cultures and H. pylori cultures were ordered however despite the patient being in the ER an extended timeframe the patient was unable to produce a stool sample.  The patient's influenza, RSV and Covid tests are negative.  Reassurance was given to the patient.  I discussed with the patient that she may very well have some type of viral illness versus a reaction to the antibiotic which is causing her loose stools.  She does have hypokalemia, dehydration and loose stools.  She will continue to monitor symptoms.  I discussed increased fluid intake and continued monitoring as well.  Return if there is any concerns for further evaluation as needed.   I explained my diagnostic considerations and recommendations and the patient voiced an understanding and was in agreement with the treatment plan. All questions were answered to the best of my ability.  We discussed potential side effects of any prescribed or recommended therapies, as well as expectations for response to  treatments.      4/11/2022   [facility]     Abdiaziz Edwards PA-C  04/11/22 2111

## 2022-04-11 NOTE — ED TRIAGE NOTES
"ED Nursing Triage Note (General)   ________________________________    Arlyn Holder is a 71 year old Female that presents to triage via private vehicle with complaints of possible UTI.  Patient states over the weekend she had frequency and states she was also having small BM's with urination.  Patient states they were at a tournament and patient states she was weak when climbing the bleacher and states she felt, \"a little off\" when standing.  Patient states driving home over the weekend she was experiencing abdominal pain and chills. Patient states stool was \"yellow and slimy\".  Patient denies abdominal pain today, however, states continued frequency associated with small BM's.  Patient states hx of prolapsed bladder.   Significant symptoms had onset 3 days ago.  LMP  (LMP Unknown) t  Patient appears alert behavior.  GCS-15  Airway: intact  Breathing noted as Normal  Action taken: 4      PRE HOSPITAL PRIOR LIVING SITUATION-home  "

## 2022-04-16 LAB
BACTERIA BLD CULT: NO GROWTH
BACTERIA BLD CULT: NO GROWTH

## 2022-05-03 NOTE — PROGRESS NOTES
"Nursing Notes:   Ambreen Pederson LPN  5/6/2022  2:14 PM  Sign at exiting of workspace  Chief Complaint   Patient presents with     Physical     Medicare     Has a list of questions and concerns.     Medication Reconciliation: complete    Ambreen Pederson LPN          Subjective   Arlyn is a 71 year old who presents for the following health issues     Has had issues with fecal incontinence and will pass little bits of stool without her knowing that they are coming out.  Has urgency.  She has no sense that she needs to go.  She had seen Dr. Mayes.  She had recommended that she start fiber.  The fiber had helped having more firm stools and several per day.  She had been told to start a probiotic after starting on antibiotics for a dental problem.  She still needs to have some dental work done.  She was having yellow, slimy stools when she was on augmentin.  This has gone away since she is done with the antibiotics.  They are back to normal color now.       Healthy Habits:     In general, how would you rate your overall health?  Fair    Frequency of exercise:  4-5 days/week    Duration of exercise:  15-30 minutes    Do you usually eat at least 4 servings of fruit and vegetables a day, include whole grains    & fiber and avoid regularly eating high fat or \"junk\" foods?  Yes    Taking medications regularly:  Yes    Medication side effects:  Other    Ability to successfully perform activities of daily living:  No assistance needed    Home Safety:  Lack of grab bars in the bathroom    Hearing Impairment:  Difficulty understanding speech on the telephone    In the past 6 months, have you been bothered by leaking of urine?  No    In general, how would you rate your overall mental or emotional health?  Good      PHQ-2 Total Score: 0    Additional concerns today:  No       Annual Wellness Visit  Patient has been advised of split billing requirements and indicates understanding: Yes     Are you in the first 12 months of your " "Medicare Part B coverage?  No    Physical Health:    See above     Mental Health:    See above  PHQ-2 Score:      Do you feel safe in your environment? Yes    Have you ever done Advance Care Planning? (For example, a Health Directive, POLST, or a discussion with a medical provider or your loved ones about your wishes)? Yes, patient states has an Advance Care Planning document and will bring a copy to the clinic.    Fall risk:  Fallen 2 or more times in the past year?: (P) No  Any fall with injury in the past year?: (P) No    Cognitive Screenin) Repeat 3 items (Leader, Season, Table)    2) Clock draw: NORMAL  3) 3 item recall: Recalls 3 objects  Results: 3 items recalled: COGNITIVE IMPAIRMENT LESS LIKELY    Mini-CogTM Copyright S Kurt. Licensed by the author for use in [employer]; reprinted with permission (flavia@[facility]). All rights reserved.      Do you have sleep apnea, excessive snoring or daytime drowsiness?: no    Current providers sharing in care for this patient include:   Patient Care Team:  Brooke Castaneda MD as PCP - General (Family Practice)  Wade Reyna MD as Assigned OBGYN Provider  Ashish Quigley MD as Assigned Surgical Provider  Lisa Palm NP as Assigned PCP    Patient has been advised of split billing requirements and indicates understanding: Yes      Review of Systems   Constitutional, HEENT, cardiovascular, pulmonary, GI, , musculoskeletal, neuro, skin, endocrine and psych systems are negative, except as otherwise noted.      Objective    /80 (BP Location: Right arm, Patient Position: Sitting, Cuff Size: Adult Regular)   Pulse 82   Temp 97.9  F (36.6  C) (Tympanic)   Resp 16   Ht 1.461 m (4' 9.5\")   Wt 68.3 kg (150 lb 9.6 oz)   LMP  (LMP Unknown)   SpO2 97%   Breastfeeding No   BMI 32.03 kg/m    Body mass index is 32.03 kg/m .  Physical Exam   GENERAL: healthy, alert and no distress  EYES: Eyes grossly normal to inspection, PERRL and " conjunctivae and sclerae normal  HENT: ear canals and TM's normal, nose and mouth without ulcers or lesions  NECK: no adenopathy, no asymmetry, masses, or scars and thyroid normal to palpation  RESP: lungs clear to auscultation - no rales, rhonchi or wheezes  BREAST: normal without masses, tenderness or nipple discharge and no palpable axillary masses or adenopathy  CV: regular rate and rhythm, normal S1 S2, no S3 or S4, no murmur, click or rub, no peripheral edema and peripheral pulses strong  ABDOMEN: soft, nontender, no hepatosplenomegaly, no masses and bowel sounds normal  MS: no gross musculoskeletal defects noted, no edema  SKIN: no suspicious lesions or rashes  NEURO: Normal strength and tone, mentation intact and speech normal  PSYCH: mentation appears normal, affect normal/bright          Assessment & Plan       ICD-10-CM    1. Encounter for Medicare annual wellness exam  Z00.00    2. Visit for screening mammogram  Z12.31 MA Screen Bilateral w/Brad   3. Need for Tdap vaccination  Z23 other medical supplies   4. Incontinence of feces, unspecified fecal incontinence type  R15.9    5. Ascending aorta dilatation (H)  I77.810 Echocardiogram Complete   6. Primary hypertension  I10 Basic Metabolic Panel     Basic Metabolic Panel   7. Pure hypercholesterolemia  E78.00 Lipid Profile     Lipid Profile   8. Screening for thyroid disorder  Z13.29 TSH with free T4 reflex     TSH with free T4 reflex     1.  Mammogram ordered.  Prior abdominal aortic aneurysm screening in 2021 was normal.  DEXA last completed 5/1/19 was normal.  Colonoscopy last completed 3/16/15 was normal.  Pap Smear deferred due to age >65.  Prevnar/pneumovax are up to date.  Declines shingrix and covid vaccines.    2.  See #1.  3.  Prescription given to obtain tdap at outside pharmacy.  4.  Offered referral back to see colorectal surgery, which she declines for now.  5.  Updated echocardiogram ordered for surveillance  6.  Stable.  Labs as above.  7.   Lipid profile ordered.  8.  TSH ordered.      Brooke Castaneda MD  [facility] AND [facility]

## 2022-05-06 ENCOUNTER — OFFICE VISIT (OUTPATIENT)
Dept: FAMILY MEDICINE | Facility: OTHER | Age: 72
End: 2022-05-06
Attending: FAMILY MEDICINE
Payer: COMMERCIAL

## 2022-05-06 VITALS
SYSTOLIC BLOOD PRESSURE: 128 MMHG | BODY MASS INDEX: 31.61 KG/M2 | WEIGHT: 150.6 LBS | HEIGHT: 58 IN | DIASTOLIC BLOOD PRESSURE: 80 MMHG | HEART RATE: 82 BPM | TEMPERATURE: 97.9 F | RESPIRATION RATE: 16 BRPM | OXYGEN SATURATION: 97 %

## 2022-05-06 DIAGNOSIS — I77.810 ASCENDING AORTA DILATATION (H): ICD-10-CM

## 2022-05-06 DIAGNOSIS — Z13.29 SCREENING FOR THYROID DISORDER: ICD-10-CM

## 2022-05-06 DIAGNOSIS — Z23 NEED FOR TDAP VACCINATION: ICD-10-CM

## 2022-05-06 DIAGNOSIS — R15.9 INCONTINENCE OF FECES, UNSPECIFIED FECAL INCONTINENCE TYPE: ICD-10-CM

## 2022-05-06 DIAGNOSIS — Z12.31 VISIT FOR SCREENING MAMMOGRAM: ICD-10-CM

## 2022-05-06 DIAGNOSIS — E78.00 PURE HYPERCHOLESTEROLEMIA: ICD-10-CM

## 2022-05-06 DIAGNOSIS — E87.6 HYPOKALEMIA: ICD-10-CM

## 2022-05-06 DIAGNOSIS — I10 PRIMARY HYPERTENSION: ICD-10-CM

## 2022-05-06 DIAGNOSIS — I10 ESSENTIAL HYPERTENSION: ICD-10-CM

## 2022-05-06 DIAGNOSIS — Z00.00 ENCOUNTER FOR MEDICARE ANNUAL WELLNESS EXAM: Primary | ICD-10-CM

## 2022-05-06 LAB
ANION GAP SERPL CALCULATED.3IONS-SCNC: 7 MMOL/L (ref 3–14)
BUN SERPL-MCNC: 21 MG/DL (ref 7–25)
CALCIUM SERPL-MCNC: 9.9 MG/DL (ref 8.6–10.3)
CHLORIDE BLD-SCNC: 101 MMOL/L (ref 98–107)
CHOLEST SERPL-MCNC: 152 MG/DL
CO2 SERPL-SCNC: 31 MMOL/L (ref 21–31)
CREAT SERPL-MCNC: 0.88 MG/DL (ref 0.6–1.2)
FASTING STATUS PATIENT QL REPORTED: YES
GFR SERPL CREATININE-BSD FRML MDRD: 69 ML/MIN/1.73M2
GLUCOSE BLD-MCNC: 100 MG/DL (ref 70–105)
HDLC SERPL-MCNC: 55 MG/DL (ref 23–92)
LDLC SERPL CALC-MCNC: 78 MG/DL
NONHDLC SERPL-MCNC: 97 MG/DL
POTASSIUM BLD-SCNC: 3.3 MMOL/L (ref 3.5–5.1)
SODIUM SERPL-SCNC: 139 MMOL/L (ref 134–144)
TRIGL SERPL-MCNC: 97 MG/DL
TSH SERPL DL<=0.005 MIU/L-ACNC: 2.46 MU/L (ref 0.4–4)

## 2022-05-06 PROCEDURE — 80048 BASIC METABOLIC PNL TOTAL CA: CPT | Mod: ZL | Performed by: FAMILY MEDICINE

## 2022-05-06 PROCEDURE — 84443 ASSAY THYROID STIM HORMONE: CPT | Mod: ZL | Performed by: FAMILY MEDICINE

## 2022-05-06 PROCEDURE — G0463 HOSPITAL OUTPT CLINIC VISIT: HCPCS

## 2022-05-06 PROCEDURE — 80061 LIPID PANEL: CPT | Mod: ZL | Performed by: FAMILY MEDICINE

## 2022-05-06 PROCEDURE — 36415 COLL VENOUS BLD VENIPUNCTURE: CPT | Mod: ZL | Performed by: FAMILY MEDICINE

## 2022-05-06 PROCEDURE — G0439 PPPS, SUBSEQ VISIT: HCPCS | Performed by: FAMILY MEDICINE

## 2022-05-06 RX ORDER — CLINDAMYCIN HYDROCHLORIDE 75 MG/1
75 CAPSULE ORAL EVERY 6 HOURS
COMMUNITY
End: 2022-06-15

## 2022-05-06 RX ORDER — HYDROCHLOROTHIAZIDE 25 MG/1
25 TABLET ORAL DAILY
Qty: 90 TABLET | Refills: 3 | Status: SHIPPED | OUTPATIENT
Start: 2022-05-06 | End: 2023-06-26

## 2022-05-06 RX ORDER — POTASSIUM CHLORIDE 1500 MG/1
20 TABLET, EXTENDED RELEASE ORAL DAILY
Qty: 90 TABLET | Refills: 3 | Status: SHIPPED | OUTPATIENT
Start: 2022-05-06 | End: 2023-06-26

## 2022-05-06 RX ORDER — ATORVASTATIN CALCIUM 20 MG/1
20 TABLET, FILM COATED ORAL DAILY
Qty: 90 TABLET | Refills: 3 | Status: SHIPPED | OUTPATIENT
Start: 2022-05-06 | End: 2023-06-26

## 2022-05-06 ASSESSMENT — PAIN SCALES - GENERAL: PAINLEVEL: NO PAIN (0)

## 2022-05-06 ASSESSMENT — ACTIVITIES OF DAILY LIVING (ADL): CURRENT_FUNCTION: NO ASSISTANCE NEEDED

## 2022-05-06 NOTE — NURSING NOTE
Chief Complaint   Patient presents with     Physical     Medicare     Has a list of questions and concerns.     Medication Reconciliation: complete    Ambreen Pederson, LPN

## 2022-05-06 NOTE — PROGRESS NOTES
"    The patient was provided with suggestions to help her develop a healthy physical lifestyle.  The patient was provided with written information regarding signs of hearing loss.  Answers for HPI/ROS submitted by the patient on 5/6/2022  In general, how would you rate your overall physical health?: fair  Frequency of exercise:: 4-5 days/week  Do you usually eat at least 4 servings of fruit and vegetables a day, include whole grains & fiber, and avoid regularly eating high fat or \"junk\" foods? : Yes  Taking medications regularly:: Yes  Medication side effects:: Other  Activities of Daily Living: no assistance needed  Home safety: lack of grab bars in the bathroom  Hearing Impairment:: difficulty understanding speech on the telephone  In the past 6 months, have you been bothered by leaking of urine?: No  In general, how would you rate your overall mental or emotional health?: good  Additional concerns today:: No  Duration of exercise:: 15-30 minutes      "

## 2022-05-06 NOTE — PATIENT INSTRUCTIONS
Patient Education   Personalized Prevention Plan  You are due for the preventive services outlined below.  Your care team is available to assist you in scheduling these services.  If you have already completed any of these items, please share that information with your care team to update in your medical record.  Health Maintenance Due   Topic Date Due     COVID-19 Vaccine (1) Never done     Hepatitis C Screening  Never done     Zoster (Shingles) Vaccine (1 of 2) Never done     Diptheria Tetanus Pertussis (DTAP/TDAP/TD) Vaccine (1 - Tdap) 12/03/2012     Mammogram  10/04/2021     Your Health Risk Assessment indicates you feel you are not in good health    A healthy lifestyle helps keep the body fit and the mind alert. It helps protect you from disease, helps you fight disease, and helps prevent chronic disease (disease that doesn't go away) from getting worse. This is important as you get older and begin to notice twinges in muscles and joints and a decline in the strength and stamina you once took for granted. A healthy lifestyle includes good healthcare, good nutrition, weight control, recreation, and regular exercise. Avoid harmful substances and do what you can to keep safe. Another part of a healthy lifestyle is stay mentally active and socially involved.    Good healthcare     Have a wellness visit every year.     If you have new symptoms, let us know right away. Don't wait until the next checkup.     Take medicines exactly as prescribed and keep your medicines in a safe place. Tell us if your medicine causes problems.   Healthy diet and weight control     Eat 3 or 4 small, nutritious, low-fat, high-fiber meals a day. Include a variety of fruits, vegetables, and whole-grain foods.     Make sure you get enough calcium in your diet. Calcium, vitamin D, and exercise help prevent osteoporosis (bone thinning).     If you live alone, try eating with others when you can. That way you get a good meal and have company  while you eat it.     Try to keep a healthy weight. If you eat more calories than your body uses for energy, it will be stored as fat and you will gain weight.     Recreation   Recreation is not limited to sports and team events. It includes any activity that provides relaxation, interest, enjoyment, and exercise. Recreation provides an outlet for physical, mental, and social energy. It can give a sense of worth and achievement. It can help you stay healthy.    Mental Exercise and Social Involvement  Mental and emotional health is as important as physical health. Keep in touch with friends and family. Stay as active as possible. Continue to learn and challenge yourself.   Things you can do to stay mentally active are:    Learn something new, like a foreign language or musical instrument.     Play SCRABBLE or do crossword puzzles. If you cannot find people to play these games with you at home, you can play them with others on your computer through the Internet.     Join a games club--anything from card games to chess or checkers or lawn bowling.     Start a new hobby.     Go back to school.     Volunteer.     Read.   Keep up with world events.    Signs of Hearing Loss      Hearing much better with one ear can be a sign of hearing loss.   Hearing loss is a problem shared by many people. In fact, it is one of the most common health problems, particularly as people age. Most people age 65 and older have some hearing loss. By age 80, almost everyone does. Hearing loss often occurs slowly over the years. So you may not realize your hearing has gotten worse.  Have your hearing checked  Call your healthcare provider if you:    Have to strain to hear normal conversation    Have to watch other people s faces very carefully to follow what they re saying    Need to ask people to repeat what they ve said    Often misunderstand what people are saying    Turn the volume of the television or radio up so high that others  complain    Feel that people are mumbling when they re talking to you    Find that the effort to hear leaves you feeling tired and irritated    Notice, when using the phone, that you hear better with one ear than the other  [employer] last reviewed this educational content on 1/1/2020 2000-2021 [employer]. All rights reserved. This information is not intended as a substitute for professional medical care. Always follow your healthcare professional's instructions.

## 2022-05-06 NOTE — LETTER
Arlyn Holder  [address] [facility] [place] [address]    5/6/2022      Dear Ms. Holder,      I wanted to let you know about your recent labs.  Your potassium was again a little low, which is a side effect of the hydrochlorothiazide you are on for your blood pressure.  It is only very mildly decreased, so eating a banana daily along with taking the potassium you are on regularly should be enough.    All of your other labs were in good range.    Please contact us at [phone] with any questions or concerns that you have.    I have attached your lab results for your records.        Sincerely,         Brooke Castaneda MD     Resulted Orders   Lipid Profile   Result Value Ref Range    Cholesterol 152 <200 mg/dL    Triglycerides 97 <150 mg/dL    Direct Measure HDL 55 23 - 92 mg/dL    LDL Cholesterol Calculated 78 <=100 mg/dL    Non HDL Cholesterol 97 <130 mg/dL    Patient Fasting > 8hrs? Yes     Narrative    Cholesterol  Desirable:  <200 mg/dL    Triglycerides  Normal:  Less than 150 mg/dL  Borderline High:  150-199 mg/dL  High:  200-499 mg/dL  Very High:  Greater than or equal to 500 mg/dL    Direct Measure HDL  Female:  Greater than or equal to 50 mg/dL   Male:  Greater than or equal to 40 mg/dL    LDL Cholesterol  Desirable:  <100mg/dL  Above Desirable:  100-129 mg/dL   Borderline High:  130-159 mg/dL   High:  160-189 mg/dL   Very High:  >= 190 mg/dL    Non HDL Cholesterol  Desirable:  130 mg/dL  Above Desirable:  130-159 mg/dL  Borderline High:  160-189 mg/dL  High:  190-219 mg/dL  Very High:  Greater than or equal to 220 mg/dL   TSH with free T4 reflex   Result Value Ref Range    TSH 2.46 0.40 - 4.00 mU/L   Basic Metabolic Panel   Result Value Ref Range    Sodium 139 134 - 144 mmol/L    Potassium 3.3 (L) 3.5 - 5.1 mmol/L    Chloride 101 98 - 107 mmol/L    Carbon Dioxide (CO2) 31 21 - 31 mmol/L    Anion Gap 7 3 - 14 mmol/L    Urea Nitrogen 21 7 - 25 mg/dL    Creatinine 0.88 0.60 - 1.20  mg/dL    Calcium 9.9 8.6 - 10.3 mg/dL    Glucose 100 70 - 105 mg/dL    GFR Estimate 69 >60 mL/min/1.73m2      Comment:      Effective December 21, 2021 eGFRcr in adults is calculated using the 2021 CKD-EPI creatinine equation which includes age and gender (Shannon macaky al., NEJM, DOI: 10.1056/WAJCun4399292)

## 2022-05-10 NOTE — TELEPHONE ENCOUNTER
Refill request for Potassium 10 mEq inappropriate. This medication was changed and filled for a year on 06/16/2017. Pharmacy alerted. Unable to complete prescription refill per RNMedication Refill Policy.................... Judith Amato ....................  3/7/2018   12:02 PM            Prescribing Provider: Brooke Hastings MD           Order Date: 06/16/2017  Ordered by: BROOKE HASTINGS  Medication:potassium chloride (K-DUR) 20 mEq Extended-Release tablet    Qty:90 tablet   Ref:3  Start:6/16/2017   End:              Route:Oral                  SYDNIE:No   Class:eRx    Sig:Take 1 tablet by mouth once daily with a meal.    Note to Pharmacy:Dose increase.    Pharmacy:[facility] - [place], [place] - [address]         Po intake < 50% x 3 days

## 2022-06-15 ENCOUNTER — OFFICE VISIT (OUTPATIENT)
Dept: FAMILY MEDICINE | Facility: OTHER | Age: 72
End: 2022-06-15
Attending: NURSE PRACTITIONER
Payer: COMMERCIAL

## 2022-06-15 VITALS
TEMPERATURE: 99.5 F | HEART RATE: 96 BPM | RESPIRATION RATE: 16 BRPM | OXYGEN SATURATION: 98 % | BODY MASS INDEX: 31.21 KG/M2 | WEIGHT: 148.7 LBS | HEIGHT: 58 IN | SYSTOLIC BLOOD PRESSURE: 126 MMHG | DIASTOLIC BLOOD PRESSURE: 80 MMHG

## 2022-06-15 DIAGNOSIS — M75.22 BICIPITAL TENDONITIS OF LEFT SHOULDER: Primary | ICD-10-CM

## 2022-06-15 PROCEDURE — 99214 OFFICE O/P EST MOD 30 MIN: CPT | Performed by: PHYSICIAN ASSISTANT

## 2022-06-15 PROCEDURE — G0463 HOSPITAL OUTPT CLINIC VISIT: HCPCS

## 2022-06-15 ASSESSMENT — PAIN SCALES - GENERAL: PAINLEVEL: EXTREME PAIN (9)

## 2022-06-15 NOTE — PATIENT INSTRUCTIONS
Please refer to your AVS for follow up and pain/symptoms management recommendations (I.e.: medications, helpful conservative treatment modalities, appropriate follow up if need to a specialist or family practice, etc.). Please return to urgent care if your symptoms change or worsen.     Discharge instructions:  -If you were prescribed a medication(s), please take this as prescribed/directed  -Monitor your symptoms, if changing/worsening, return to UC/ER or PCP for follow up    For pain control - we recommend alternating Tylenol and Ibuprofen if you are able to take these medications. Alternate every 4 hours as needed. I.e.: Ibuprofen at 8am, Tylenol 12pm, Ibuprofen 4pm    -Daily maximum of Tylenol is 4000mg (recommend staying under 3000mg)   -Daily maximum of Ibuprofen is 3200 mg  - Heat, ice, gentle stretching (among other remedies: biofreeze/icy hot, etc).     Sleep in recliner. Wear sling for a few days then out of sling to prevent further stiffness.

## 2022-06-15 NOTE — PROGRESS NOTES
ASSESSMENT/PLAN:    I have reviewed the nursing notes.  I have reviewed the findings, diagnosis, plan and need for follow up with the patient.    1. Bicipital tendonitis of left shoulder  -Vital signs stable physical exam consistent with bicipital tendinitis of left shoulder.  She is exquisitely tender over bicipital tendon and bicipital groove, entirety of biceps musculature.  Pain is worsened on examination with bicipital testing. Bicipital tendon intact proximally and distally, no pop eye deformity. Remainder of examination including shoulder special testing for rotator cuff pathology is deferred by patient due to pain.  Recommend to alternate Tylenol and ibuprofen every 4-6 hours (daily limits reviewed and after visit summary), avoid overuse, ease into activities of daily living.  She has a sling at home that she will use for comfort over the next 1 to 3 days.  She may sleep in recliner for comfort as well. If pain persisting over next 10-14 days recommend reevaluation. Patient is in agreement and understanding of the above treatment plan. All questions and concerns were addressed and answered to patient's satisfaction. AVS reviewed with patient.     Discussed warning signs/symptoms indicative of need to f/u    Follow up if symptoms persist or worsen or concerns    30 minutes was spent face-to-face with patient and coordination of care, reviewing pathophysiology and typical treatment plan of bicipital tendinitis.    I explained my diagnostic considerations and recommendations to the patient, who voiced understanding and agreement with the treatment plan. All questions were answered. We discussed potential side effects of any prescribed or recommended therapies, as well as expectations for response to treatments.    Angella Carolina PA-C  6/15/2022  3:53 PM    HPI:    Arlyn Holder is a 72 year old female  who presents to [facility] today for concerns of left arm pain since Sunday.  Patient states on Sunday  she was mowing and had a push mower and was pushing the mower up hills.  Patient states that this morning her fingers were swollen.       She notes pain initially started at level of collar bone and progressed to finger tips this AM (all fingers are involved). She reports pain 7.5-8/5/10. Ice makes pain better slightly. She took a Flexeril at night to calm down symptoms and notes this made her groggy. Pain is worse with motion - worse trying to lift her arm to fix her hair. Better with arm at her side. She notes pain to palpation over collar bone - horrible pain. Mid forearm, feels a throbbing sensation intermittently. Edema noted to left hand. Denies numbness, tingling or burning. No falls. Denies popping, snapping, catching. No prior falls, injuries or trauma prior current episode.     Treatments tried: rest, ice.     Allergies: Augmentin, Codeine, Diazepam, Naproxen, Sulfa, Chlorhexidine, Sulfacetamide     PCP: MD Leonel    Past Medical History:   Diagnosis Date     AC (acromioclavicular) arthritis 3/1/2018     Bunion of great toe of right foot     No Comments Provided     Carpal tunnel syndrome     2010     Complete tear of right rotator cuff 3/1/2018     Essential (primary) hypertension     No Comments Provided     Gastro-esophageal reflux disease without esophagitis     12/3/2012     Hyperlipidemia     10/4/2011     Impingement syndrome of right shoulder     10/09/07     Personal history of other medical treatment (CODE)      3, Para 3 with three vaginal deliveries     S/P arthroscopy of right shoulder 2018     Thoracic aortic ectasia (H)     3/23/2016,3.5-3.6 cm on CT 3/2016 - follow up in 1 year     Uterovaginal prolapse     preloader comment: selections available to preload differs from paper chart.   *Pelvic floor relaxation     Past Surgical History:   Procedure Laterality Date     ARTHROSCOPY SHOULDER ROTATOR CUFF REPAIR Right 2018    Procedure: ARTHROSCOPY SHOULDER ROTATOR  CUFF REPAIR;  Right Shoulder Arthroscopy with Subacromial Decompression, Distal Clavicle Excision, Rotator Cuff Repair, Biceps Tenotomy, Extensive Debridement;  Surgeon: Wilner Painting DO;  Location: [facility] OR     AS SHOULDER ARTHROSCOPY, DX Right 4/11/18    Dr. Painting     BUNIONECTOMY      1999,right     COLONOSCOPY      2004, normal     COLONOSCOPY  03/16/2015    Normal exam, F/U 2025     COLPORRHAPHY ANTERIOR, POSTERIOR, COMBINED N/A 10/19/2020    Procedure: COLPORRHAPHY, COMBINED ANTEROPOSTERIOR, ENTEROCELE REPAIR, COLPOCLIESIS, Repair eroded mesh;  Surgeon: Wade Reyna MD;  Location: [facility] OR     CYSTOSCOPY N/A 10/19/2020    Procedure: CYSTOSCOPY;  Surgeon: Wade Reyna MD;  Location: [facility] OR     HYSTERECTOMY VAGINAL      1982,for uterine prolapse with A&P repair.  Tube and ovaries still in place.     LAPAROSCOPY DIAGNOSTIC (GYN)      1989,for pelvic adhesion     LAPAROSCOPY DIAGNOSTIC (GYN)      11/08,vaginal vault prolapse - Dr. Mumtaz Marshall - anterior/posterior repair with bladder mesh.     RELEASE CARPAL TUNNEL      Dr. Méndez     S/P RIGHT SHOULDER ARTHROSCOPY Right 04/11/2018     Social History     Tobacco Use     Smoking status: Passive Smoke Exposure - Never Smoker     Smokeless tobacco: Never Used     Tobacco comment: Quit smoking:  was a smoker   Substance Use Topics     Alcohol use: No     Alcohol/week: 0.0 standard drinks     Current Outpatient Medications   Medication Sig Dispense Refill     aspirin (ASA) 81 MG tablet Take by mouth daily 30 tablet      atorvastatin (LIPITOR) 20 MG tablet Take 1 tablet (20 mg) by mouth daily 90 tablet 3     Cyanocobalamin (VITAMIN B-12 CR) 1000 MCG TBCR        cyclobenzaprine (FLEXERIL) 10 MG tablet Take 1 tablet (10 mg) by mouth 3 times daily as needed for muscle spasms 15 tablet 0     hydrochlorothiazide (HYDRODIURIL) 25 MG tablet Take 1 tablet (25 mg) by mouth daily 90 tablet 3     lecithin 400 MG CAPS Take 1 capsule by mouth 2 times daily        "Multiple Vitamins-Minerals (MENS MULTIVITAMIN PLUS) TABS  30 tablet      other medical supplies Tdap.  Diagnosis:  Z23. 1 each 0     potassium chloride ER (KLOR-CON M) 20 MEQ CR tablet Take 1 tablet (20 mEq) by mouth daily 90 tablet 3     triamcinolone (KENALOG) 0.1 % external cream Apply topically 2 times daily 30 g 4     vitamin E (TOCOPHEROL) 100 UNIT capsule        zinc gluconate 50 MG tablet Take 50 mg by mouth daily       azithromycin (ZITHROMAX) 250 MG tablet TAKE 2 TABLETS BY MOUTH TODAY, THEN TAKE 1 TABLET DAILY FOR 4 DAYS (Patient not taking: No sig reported)       clindamycin (CLEOCIN) 75 MG capsule Take 75 mg by mouth every 6 hours (Patient not taking: Reported on 6/15/2022)       Allergies   Allergen Reactions     Augmentin      Pt states she did not tolerate     Codeine Nausea and Vomiting     Diazepam      Other reaction(s): Hallucinations     Naproxen Other (See Comments)     Mouth sores     Sulfa Drugs Unknown     Chlorhexidine Rash     Sulfacetamide Rash     Had a reaction to sulfa drug but is not sure which one it was-states the reaction resulted in redness     Past medical history, past surgical history, current medications and allergies reviewed and accurate to the best of my knowledge.      ROS:  Refer to HPI    /80 (BP Location: Right arm, Patient Position: Sitting, Cuff Size: Adult Regular)   Pulse 96   Temp (!) 100.5  F (38.1  C) (Tympanic)   Resp 16   Ht 1.473 m (4' 10\")   Wt 67.4 kg (148 lb 11.2 oz)   LMP  (LMP Unknown)   SpO2 98%   Breastfeeding No   BMI 31.08 kg/m      EXAM:  General Appearance: Well appearing 72 year old female, appropriate appearance for age. No acute distress   Respiratory: normal chest wall and respirations.  Normal effort.  Clear to auscultation bilaterally, no wheezing, crackles or rhonchi.  No increased work of breathing.  No cough appreciated.  Cardiac: RRR with no murmurs  Musculoskeletal:    LEFT SHOULDER PHYSICAL EXAMINATION:  Gross Examination: " shoulders appear symmetrical in appearance, no signs of bony deformity over the AC, clavicle or glenohumeral joints. No signs of previous or current trauma. No signs of ecchymosis. Skin is intact.     Palpation: Tenderness to palpation: proximal biceps tendon and bicep musculature proximally and distally    ROM: flexion 90* prior to pain, extension 45*, abduction 160*, adduction 40*, IR and ER deferred    Special Testing:   Shoulder Strength:    Speed/Yergason (bicep): difficult to interpret due to guarding      Instability:    Apprehension: declined    Cross Over: declined     Impingement:    Neer: declined    Colin: declined    Empty Can (supraspinatus): declined    Neurovascular status: distal pulses are intact and are 2+. Two point discrimination intact. Distal capillary refill intact < 3 seconds.   Dermatological: no rashes noted of exposed skin  Psychological: normal affect, alert, oriented, and pleasant.     Labs:  None     Xray:  None

## 2022-06-15 NOTE — NURSING NOTE
"Patient presents to the clinic today with left arm pain since Sunday.  Patient states on Sunday she was mowing and had a push mower and was pushing the mower up hills.  Patient states that this morning her fingers were swollen.     Natty Tidwell LPN 6/15/2022   3:40 PM    Chief Complaint   Patient presents with     Arm Problem     Left       Initial /80 (BP Location: Right arm, Patient Position: Sitting, Cuff Size: Adult Regular)   Pulse 96   Temp (!) 100.5  F (38.1  C) (Tympanic)   Resp 16   Ht 1.473 m (4' 10\")   Wt 67.4 kg (148 lb 11.2 oz)   LMP  (LMP Unknown)   SpO2 98%   Breastfeeding No   BMI 31.08 kg/m   Estimated body mass index is 31.08 kg/m  as calculated from the following:    Height as of this encounter: 1.473 m (4' 10\").    Weight as of this encounter: 67.4 kg (148 lb 11.2 oz).  Medication Reconciliation: complete  Natty Tidwell LPN    "

## 2022-07-19 NOTE — NURSING NOTE
Patient Information     Patient Name MRN Sex Arlyn Ron [id number] Female 1950      Nursing Note by Vanessa Orr at 2018  9:45 AM     Author:  Vanessa Orr Service:  (none) Author Type:  (none)     Filed:  2018 10:48 AM Encounter Date:  2018 Status:  Signed     :  Vanessa Orr            Universal Protocol    A. Pre-procedure verification complete yes  1-relevant information / documentation available, reviewed and properly matched to the patient; 2-consent accurate and complete, 3-equipment and supplies available    B. Site marking complete N/A  Site marked if not in continuous attendance with patient    C. TIME OUT completed yes  Time Out was conducted just prior to starting procedure to verify the eight required elements: 1-patient identity, 2-consent accurate and complete, 3-position, 4-correct side/site marked (if applicable), 5-procedure, 6-relevant images / results properly labeled and displayed (if applicable), 7-antibiotics / irrigation fluids (if applicable), 8-safety precautions.   Vanessa Orr LPN .......2018 10:42 AM          [FreeTextEntry1] : f/u insect sting  [de-identified] : Rosalio presents with swelling after insect sting. Reports he was cutting a bush when he was stung by a bee. Developed swelling, redness, and some mild tenderness. He took a benadryl but still has some swelling and tenderness. Had been stung by bees before but never had such a bad reaction. Reports no hx of increased swelling or reactions with other bug bites.No f/c. No other hives. No GI symptoms or difficulty breathing.

## 2022-07-29 ENCOUNTER — HOSPITAL ENCOUNTER (OUTPATIENT)
Dept: CARDIOLOGY | Facility: OTHER | Age: 72
Discharge: HOME | End: 2022-07-29
Attending: FAMILY MEDICINE
Payer: MEDICARE

## 2022-07-29 ENCOUNTER — HOSPITAL ENCOUNTER (OUTPATIENT)
Dept: MAMMOGRAPHY | Facility: OTHER | Age: 72
Discharge: HOME | End: 2022-07-29
Attending: FAMILY MEDICINE
Payer: MEDICARE

## 2022-07-29 DIAGNOSIS — I77.810 ASCENDING AORTA DILATATION (H): ICD-10-CM

## 2022-07-29 DIAGNOSIS — Z12.31 VISIT FOR SCREENING MAMMOGRAM: ICD-10-CM

## 2022-07-29 LAB — LVEF ECHO: NORMAL

## 2022-07-29 PROCEDURE — 77067 SCR MAMMO BI INCL CAD: CPT

## 2022-07-29 PROCEDURE — 93306 TTE W/DOPPLER COMPLETE: CPT

## 2022-07-29 PROCEDURE — 93306 TTE W/DOPPLER COMPLETE: CPT | Mod: 26 | Performed by: STUDENT IN AN ORGANIZED HEALTH CARE EDUCATION/TRAINING PROGRAM

## 2022-07-29 NOTE — PROGRESS NOTES
When doing the left MLO view the corner of the paddle came down on Patients breast below the collar bone-Patient said it hurt. I released the compression but she said it still hurt- I gave her ice and a bra to hold ice in place. The skin was red but not torn-   Patient said she had shoulder pain in the past and this pain today could have happened because she said she has been more sensitive in that area.  Patient said it felt better with the ice.  Callie Tsang on 7/29/2022 at 10:56 AM

## 2022-08-22 ENCOUNTER — TRANSFERRED RECORDS (OUTPATIENT)
Dept: HEALTH INFORMATION MANAGEMENT | Facility: OTHER | Age: 72
End: 2022-08-22

## 2023-01-16 ENCOUNTER — OFFICE VISIT (OUTPATIENT)
Dept: FAMILY MEDICINE | Facility: OTHER | Age: 73
End: 2023-01-16
Attending: FAMILY MEDICINE
Payer: COMMERCIAL

## 2023-01-16 VITALS
BODY MASS INDEX: 31.91 KG/M2 | TEMPERATURE: 97.5 F | DIASTOLIC BLOOD PRESSURE: 86 MMHG | WEIGHT: 152 LBS | HEART RATE: 84 BPM | SYSTOLIC BLOOD PRESSURE: 138 MMHG | HEIGHT: 58 IN | RESPIRATION RATE: 16 BRPM | OXYGEN SATURATION: 99 %

## 2023-01-16 DIAGNOSIS — M79.89 SOFT TISSUE MASS: ICD-10-CM

## 2023-01-16 DIAGNOSIS — M54.16 LUMBAR RADICULOPATHY: Primary | ICD-10-CM

## 2023-01-16 PROCEDURE — 99213 OFFICE O/P EST LOW 20 MIN: CPT | Performed by: FAMILY MEDICINE

## 2023-01-16 PROCEDURE — G0463 HOSPITAL OUTPT CLINIC VISIT: HCPCS

## 2023-01-16 RX ORDER — PREDNISONE 20 MG/1
20 TABLET ORAL 2 TIMES DAILY
Qty: 10 TABLET | Refills: 0 | Status: SHIPPED | OUTPATIENT
Start: 2023-01-16 | End: 2023-01-21

## 2023-01-16 ASSESSMENT — PAIN SCALES - GENERAL: PAINLEVEL: SEVERE PAIN (6)

## 2023-01-16 ASSESSMENT — ENCOUNTER SYMPTOMS: LEG PAIN: 1

## 2023-01-16 NOTE — NURSING NOTE
"Chief Complaint   Patient presents with     Leg Pain     Bilateral leg pain left leg since October and right leg since Christmas   She has been having pain in her left leg since October and her right leg since Christmas.  Gela Villafana LPN..................1/16/2023   1:51 PM      Initial /86   Pulse 84   Temp 97.5  F (36.4  C) (Temporal)   Resp 16   Ht 1.473 m (4' 10\")   Wt 68.9 kg (152 lb)   LMP  (LMP Unknown)   SpO2 99%   Breastfeeding No   BMI 31.77 kg/m   Estimated body mass index is 31.77 kg/m  as calculated from the following:    Height as of this encounter: 1.473 m (4' 10\").    Weight as of this encounter: 68.9 kg (152 lb).  Medication Reconciliation: complete    FOOD SECURITY SCREENING QUESTIONS  Hunger Vital Signs:  Within the past 12 months we worried whether our food would run out before we got money to buy more. Never  Within the past 12 months the food we bought just didn't last and we didn't have money to get more. Never        Advance care directive on file? no  Advance care directive provided to patient? States she has the paperwork for it     Gela Villafana LPN  "

## 2023-01-16 NOTE — PROGRESS NOTES
"Nursing Notes:   Gela Villafana LPN  1/16/2023  1:51 PM  Sign at exiting of workspace  Chief Complaint   Patient presents with     Leg Pain     Bilateral leg pain left leg since October and right leg since Christmas   She has been having pain in her left leg since October and her right leg since Christmas.  Gela Villafana LPN..................1/16/2023   1:51 PM      Initial /86   Pulse 84   Temp 97.5  F (36.4  C) (Temporal)   Resp 16   Ht 1.473 m (4' 10\")   Wt 68.9 kg (152 lb)   LMP  (LMP Unknown)   SpO2 99%   Breastfeeding No   BMI 31.77 kg/m   Estimated body mass index is 31.77 kg/m  as calculated from the following:    Height as of this encounter: 1.473 m (4' 10\").    Weight as of this encounter: 68.9 kg (152 lb).  Medication Reconciliation: complete    FOOD SECURITY SCREENING QUESTIONS  Hunger Vital Signs:  Within the past 12 months we worried whether our food would run out before we got money to buy more. Never  Within the past 12 months the food we bought just didn't last and we didn't have money to get more. Never        Advance care directive on file? no  Advance care directive provided to patient? States she has the paperwork for it     Gela Villafana LPN        Key Stoddard is a 72 year old, presenting for the following health issues:  Leg Pain (Bilateral leg pain left leg since October and right leg since Christmas)    Having bilateral leg pain.  Left is worse than right.  Left has altered sensation.  Feels like leather.  No fall or other injuries to start this.  No bowel/bladder dysfunction or saddle anesthesia.  Has a sensation of needles if she presses on her left lateral thigh.  Pain shoots down into her legs.    She has a soft tissue mass in her right lower back that has appeared recently.  She has had prior epidermal cyst removal in a different location and would like to have this one removed as well as it is causing discomfort.    Leg Pain    History of Present " "Illness       Reason for visit:  Leg pain and lump  Symptom onset:  More than a month  Symptoms include:  Pain in legsand lump  Symptom intensity:  Moderate  Symptom progression:  Staying the same  Had these symptoms before:  No  What makes it worse:  Sitting too long  What makes it better:  Exercise    She eats 2-3 servings of fruits and vegetables daily.She consumes 1 sweetened beverage(s) daily.She exercises with enough effort to increase her heart rate 20 to 29 minutes per day.    She is taking medications regularly.             Review of Systems   Constitutional, HEENT, cardiovascular, pulmonary, GI, , musculoskeletal, neuro, skin, endocrine and psych systems are negative, except as otherwise noted.      Objective    /86   Pulse 84   Temp 97.5  F (36.4  C) (Temporal)   Resp 16   Ht 1.473 m (4' 10\")   Wt 68.9 kg (152 lb)   LMP  (LMP Unknown)   SpO2 99%   Breastfeeding No   BMI 31.77 kg/m    Body mass index is 31.77 kg/m .  Physical Exam  Constitutional:       Appearance: Normal appearance.   HENT:      Head: Normocephalic.   Eyes:      Extraocular Movements: Extraocular movements intact.      Pupils: Pupils are equal, round, and reactive to light.   Cardiovascular:      Rate and Rhythm: Normal rate and regular rhythm.      Heart sounds: Normal heart sounds. No murmur heard.  Pulmonary:      Effort: Pulmonary effort is normal.      Breath sounds: Normal breath sounds. No wheezing, rhonchi or rales.   Musculoskeletal:      Cervical back: Normal range of motion and neck supple.      Comments: No spinous process tenderness in lumbar spine.  No sciatic notch tenderness.      Strength with dorsi/plantar flexion, flexion/extension at knees and hips is symmetric and normal bilaterally.   Skin:     Comments: 1.5 cm soft tissue mass noted in her right lumbar region.   Neurological:      Mental Status: She is alert.      Comments: DTRs are 2+ at the knees and achilles bilaterally.    Psychiatric:         " Mood and Affect: Mood normal.         Behavior: Behavior normal.        Assessment & Plan   Arlyn Holder is a 72 year old, presenting for the following health issues:      ICD-10-CM    1. Lumbar radiculopathy  M54.16 MR Lumbar Spine w/o Contrast     predniSONE (DELTASONE) 20 MG tablet      2. Soft tissue mass  M79.89 Adult General Surg Referral        1.  MRI of her lumbar spine ordered.  Trial of prednisone.  Consider referral for steroid injection vs Physical Therapy if indicated based on imaging.    2.  Referred to general surgery for consideration of excision.      Brooke Castaneda MD  [facility]

## 2023-01-23 ENCOUNTER — HOSPITAL ENCOUNTER (OUTPATIENT)
Dept: MRI IMAGING | Facility: OTHER | Age: 73
Discharge: HOME | End: 2023-01-23
Attending: FAMILY MEDICINE | Admitting: FAMILY MEDICINE
Payer: MEDICARE

## 2023-01-23 DIAGNOSIS — M54.16 LUMBAR RADICULOPATHY: ICD-10-CM

## 2023-01-23 PROCEDURE — 72148 MRI LUMBAR SPINE W/O DYE: CPT | Mod: MF

## 2023-01-24 DIAGNOSIS — M54.16 LUMBAR RADICULOPATHY: Primary | ICD-10-CM

## 2023-01-31 ENCOUNTER — OFFICE VISIT (OUTPATIENT)
Dept: SURGERY | Facility: OTHER | Age: 73
End: 2023-01-31
Attending: SURGERY
Payer: MEDICARE

## 2023-01-31 VITALS
TEMPERATURE: 97.5 F | HEART RATE: 72 BPM | RESPIRATION RATE: 16 BRPM | BODY MASS INDEX: 31.77 KG/M2 | DIASTOLIC BLOOD PRESSURE: 80 MMHG | WEIGHT: 152 LBS | OXYGEN SATURATION: 98 % | SYSTOLIC BLOOD PRESSURE: 132 MMHG

## 2023-01-31 DIAGNOSIS — M79.89 SOFT TISSUE MASS: ICD-10-CM

## 2023-01-31 PROCEDURE — G0463 HOSPITAL OUTPT CLINIC VISIT: HCPCS

## 2023-01-31 PROCEDURE — 11402 EXC TR-EXT B9+MARG 1.1-2 CM: CPT | Performed by: SURGERY

## 2023-01-31 PROCEDURE — 11401 EXC TR-EXT B9+MARG 0.6-1 CM: CPT | Performed by: SURGERY

## 2023-01-31 PROCEDURE — 12031 INTMD RPR S/A/T/EXT 2.5 CM/<: CPT | Performed by: SURGERY

## 2023-01-31 ASSESSMENT — PAIN SCALES - GENERAL: PAINLEVEL: NO PAIN (0)

## 2023-01-31 NOTE — PATIENT INSTRUCTIONS
Your incision was closed with stitches that will dissolve. A surgical glue was used to help keep the incision closed.    It is ok to get the incision wet in the shower on the day after your procedure. Pat dry the area. Do not rub.    Don't soak in a tub, pool or lake for 7 days. If you have concerns, please call [phone] and ask for nurse in [address] THALIA Vargas RN  ....................  1/31/2023   10:07 AM

## 2023-01-31 NOTE — NURSING NOTE
TIMEOUT    Universal Protocol    A. Pre-procedure verification complete   1-relevant information / documentation available, reviewed and properly matched to the patient; 2-consent accurate and complete, 3-equipment and supplies available    B. Site marking complete   Site marked if not in continuous attendance with patient    C. TIME OUT completed   Time Out was conducted just prior to starting procedure to verify the eight required elements: 1-patient identity, 2-consent accurate and complete, 3-position, 4-correct side/site marked (if applicable), 5-procedure, 6-relevant images / results properly labeled and displayed (if applicable), 7-antibiotics / irrigation fluids (if applicable), 8-safety precautions.    Surgical Nurse  ....................  1/31/2023   10:05 AM

## 2023-01-31 NOTE — NURSING NOTE
"Chief Complaint   Patient presents with     Derm Problem     Lower back mass     Has drained in the past but recently just started to get more irritating.     Initial /80 (BP Location: Right arm, Patient Position: Sitting, Cuff Size: Adult Regular)   Pulse 72   Temp 97.5  F (36.4  C) (Tympanic)   Resp 16   Wt 68.9 kg (152 lb)   LMP  (LMP Unknown)   SpO2 98%   BMI 31.77 kg/m   Estimated body mass index is 31.77 kg/m  as calculated from the following:    Height as of 1/16/23: 1.473 m (4' 10\").    Weight as of this encounter: 68.9 kg (152 lb).  Meds Reconciled: complete      Alicia Whyte RN      "

## 2023-01-31 NOTE — PROGRESS NOTES
Procedure Note      Pre/Post Operative Diagnosis:   Right back cyst    Procedure:   Removal of Right back cyst     Surgeon: Ashish Quigley MD    Local Anesthesia: 1% lidocaine with 0.25% Marcaine with epinephrine    Indication for the procedure:  This is a 72 year old female  patient referred by Brooke Castaneda with a Right back cyst.       After explaining the risks to include bleeding, infection, recurrence or need for reexcision, and scarring the patient wished to proceed.    Procedure:   The area was prepped and draped in usual sterile fashion with ChloraPrep.   After, adequate local anesthesia, an 2 cm X 1 cm elliptical skin incision was made to encompass the  2cm subcutaneous lesion. 3-0 vicryl was used for deep tissue.  The skin was closed with 4-0 Monocryl and Dermabond.      Plan:  The cyst was inspected and discarded. Patient will follow up if there any problems with the wound including redness or drainage.      Ashish Quigley MD....................  1/31/2023   10:31 AM

## 2023-03-01 ENCOUNTER — HOSPITAL ENCOUNTER (OUTPATIENT)
Dept: PHYSICAL THERAPY | Facility: OTHER | Age: 73
Setting detail: THERAPIES SERIES
Discharge: HOME | End: 2023-03-01
Attending: FAMILY MEDICINE
Payer: MEDICARE

## 2023-03-01 DIAGNOSIS — M54.16 LUMBAR RADICULOPATHY: ICD-10-CM

## 2023-03-01 PROCEDURE — 97161 PT EVAL LOW COMPLEX 20 MIN: CPT | Mod: GP,PO

## 2023-03-01 PROCEDURE — 97110 THERAPEUTIC EXERCISES: CPT | Mod: GP,PO

## 2023-03-01 NOTE — PROGRESS NOTES
[facility]    OUTPATIENT PHYSICAL THERAPY ORTHOPEDIC EVALUATION  PLAN OF TREATMENT FOR OUTPATIENT REHABILITATION  (COMPLETE FOR INITIAL CLAIMS ONLY)  Patient's Last Name, First Name, M.I.  YOB: 1950  Arlyn Holder    Provider s Name:  [facility]   Medical Record No.  [id number]   Start of Care Date:  03/01/23   Onset Date:  12/25/22 (Notes that she has always had pain but this was a rough day for her.)   Type:     _X__PT   ___OT   ___SLP Medical Diagnosis:  Lumbar radiculopathy (M54.16)     PT Diagnosis:  Impaired mobility, decreased strength and endurance, low back pain with radicular symptoms   Visits from SOC:  1      _________________________________________________________________________________  Plan of Treatment/Functional Goals:  joint mobilization, manual therapy, neuromuscular re-education, ROM, strengthening, stretching     Cryotherapy, Electrical stimulation, Hot packs, Ultrasound     Goals  Goal Identifier: Reaching  Goal Description: Patient will be able to reach overhead with both hands and no increase in back or leg pain in order to get items in an upper cupboard.  Target Date: 04/26/23    Goal Identifier: Lifting  Goal Description: Patient will be able to lift her great grand child  with no increase in back or leg symptoms in order to improve her overall function at home.  Target Date: 04/26/23    Goal Identifier: Sitting  Goal Description: Patient will be able to sit for longer than 60 minutes with no increase in back or leg pain in order to improve her safety and efficiency with travel in a vehicle.  Target Date: 04/26/23    Therapy Frequency:  other (see comments) (1-2 times per week)  Predicted Duration of Therapy Intervention:  8 weeks    Derrell Gil, PT                 I CERTIFY THE NEED FOR THESE SERVICES FURNISHED UNDER        THIS  PLAN OF TREATMENT AND WHILE UNDER MY CARE     (Physician co-signature of this document indicates review and certification of the therapy plan).                     Certification Date From:  03/01/23   Certification Date To:  04/26/23    Referring Provider:  Brooke Castaneda MD    Initial Assessment        See Epic Evaluation Start of Care Date: 03/01/23

## 2023-03-01 NOTE — PROGRESS NOTES
"   03/01/23 0800   General Information   Type of Visit Initial OP Ortho PT Evaluation   Start of Care Date 03/01/23   Referring Physician Brooke Castaneda MD   Patient/Family Goals Statement To reduce her pain and LE symptoms   Orders Evaluate and Treat   Date of Order 01/24/23   Certification Required? Yes   Medical Diagnosis Lumbar radiculopathy (M54.16)   Surgical/Medical history reviewed Yes   Precautions/Limitations no known precautions/limitations       Present No   Body Part(s)   Body Part(s) Lumbar Spine/SI   Presentation and Etiology   Pertinent history of current problem (include personal factors and/or comorbidities that impact the POC) Patient is a 72 year old female referred to physical therapy with lumbar radiculopathy. She reports that she has had back pain for a while but her symptoms got really bad on Leeann day. She typically has symptoms on the left LE but has been getting it in the right hand side. Had an MRI today.   Impairments E. Decreased flexibility;G. Impaired balance;H. Impaired gait;K. Numbness;L. Tingling   Functional Limitations perform activities of daily living;perform required work activities;perform desired leisure / sports activities   Symptom Location Low back and bilateral LE's   How/Where did it occur From insidious onset   Onset date of current episode/exacerbation 12/25/22  (Notes that she has always had pain but this was a rough day for her.)   Chronicity Chronic   Pain rating (0-10 point scale) Best (/10)   Best (/10) 5   Pain quality G. Cramping;H. Other   Pain quality comment Reports that her thighs at times feel like \"leather\"   Frequency of pain/symptoms A. Constant   Pain/symptoms are: Worse during the day   Pain/symptoms exacerbated by A. Sitting;E. Rest;M. Other   Pain exacerbation comment Reaching overhead, lifting grand child   Pain/symptoms eased by C. Rest;E. Changing positions;I. OTC medication(s)   Progression of symptoms since " onset: Unchanged   Prior Level of Function   Prior Level of Function-Mobility Independent   Prior Level of Function-ADLs Independent   Functional Level Prior Comment Patient reports that she helps take care of her  who has previously suffered from a CVA   Current Level of Function   Current Community Support Family/friend caregiver   Patient role/employment history C. 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 No   Have you fallen and had an injury in the past year? No   Is patient a fall risk? No   Abuse Screen (yes response referral indicated)   Physical Signs of Abuse Present no   Lumbar Spine/SI Objective Findings   Gait/Locomotion Slower gait speed   Hamstring Flexibility WFL   Hip Flexor Flexibility Min limited on left   Piriformis Flexibility Min limited bilaterally   Flexion ROM Able to reach the tops of her feet with her fingertips   Extension ROM Mod limited secondary to pain   Right Side Bending ROM Able to reach lateral knee joint line with fingertips with pain in low back   Left Side Bending ROM Able to reach lateral knee joint line with fingertips with pain in low back   Lumbar ROM Comment Rotation: WFL both directions   Hip Screen Scour: negative, ROGE/FADIR: negative   Hip Flexion (L2) Strength 4+/5   Hip Abduction Strength 5/5   Hip Adduction Strength 5/5   Knee Flexion Strength 5/5   Knee Extension (L3) Strength 5/5   Ankle Dorsiflexion (L4) Strength 5/5   Palpation Discomfort noted with palpation to lumbar paraspinals, gluteus medius, and piriformis on the left.   Slump Test Mild increase in symptoms bilaterally   Observation Patient resting comfortably in chair. No apparent distress.   Integumentary No significant findigns    Posture Slight protraction of shoulders   Sensation Testing Intact to light touch   Planned Therapy Interventions   Planned Therapy Interventions joint mobilization;manual therapy;neuromuscular re-education;ROM;strengthening;stretching   Planned Modality Interventions   Planned Modality Interventions Cryotherapy;Electrical stimulation;Hot packs;Ultrasound   Clinical Impression   Criteria for Skilled Therapeutic Interventions Met yes, treatment indicated   PT Diagnosis Impaired mobility, decreased strength and endurance, low back pain with radicular symptoms   Influenced by the following impairments pain, stiffness, weakness   Functional limitations due to impairments Reaching overhead, prolonged sitting, lifting   Clinical Presentation Stable/Uncomplicated   Clinical Presentation Rationale Clinical judgement   Clinical Decision Making (Complexity) Low complexity   Therapy Frequency other (see comments)  (1-2 times per week)   Predicted Duration of Therapy Intervention (days/wks) 8 weeks   Risk & Benefits of therapy have been explained Yes   Patient, Family & other staff in agreement with plan of care Yes   Clinical Impression Comments Signs and symptoms consisent with chronic low back pain with radicular symptoms. She has more trouble with extension in her low back. Has pain with lifting and finds her self arching her back with lifting heavier items. She would benefit from skilled PT services in order to reduce her pain and improve her mobility, strength, and endurance   Education Assessment   Barriers to Learning No barriers   ORTHO GOALS   PT Ortho Eval Goals 1;2;3   Ortho Goal 1   Goal Identifier Reaching   Goal Description Patient will be able to reach overhead with both hands and no increase in back or leg pain in order to get items in an upper cupboard.   Target Date 04/26/23   Ortho Goal 2   Goal Identifier Lifting   Goal Description Patient will be able to lift her great grand child  with no increase in  back or leg symptoms in order to improve her overall function at home.   Target Date 04/26/23   Ortho Goal 3   Goal Identifier Sitting   Goal Description Patient will be able to sit for longer than 60 minutes with no increase in back or leg pain in order to improve her safety and efficiency with travel in a vehicle.   Target Date 04/26/23   Total Evaluation Time   PT Major Low Complexity Minutes (80196) 40   Therapy Certification   Certification date from 03/01/23   Certification date to 04/26/23   Medical Diagnosis Lumbar radiculopathy (M54.16)

## 2023-03-03 ENCOUNTER — HOSPITAL ENCOUNTER (OUTPATIENT)
Dept: PHYSICAL THERAPY | Facility: OTHER | Age: 73
Setting detail: THERAPIES SERIES
Discharge: HOME | End: 2023-03-03
Attending: FAMILY MEDICINE
Payer: MEDICARE

## 2023-03-03 PROCEDURE — 97140 MANUAL THERAPY 1/> REGIONS: CPT | Mod: GP,CQ

## 2023-03-03 PROCEDURE — 97110 THERAPEUTIC EXERCISES: CPT | Mod: GP,CQ

## 2023-03-06 ENCOUNTER — HOSPITAL ENCOUNTER (OUTPATIENT)
Dept: PHYSICAL THERAPY | Facility: OTHER | Age: 73
Setting detail: THERAPIES SERIES
Discharge: HOME | End: 2023-03-06
Attending: FAMILY MEDICINE
Payer: MEDICARE

## 2023-03-06 PROCEDURE — 97110 THERAPEUTIC EXERCISES: CPT | Mod: GP,PO

## 2023-03-06 PROCEDURE — 97140 MANUAL THERAPY 1/> REGIONS: CPT | Mod: GP,PO

## 2023-03-09 ENCOUNTER — HOSPITAL ENCOUNTER (OUTPATIENT)
Dept: PHYSICAL THERAPY | Facility: OTHER | Age: 73
Setting detail: THERAPIES SERIES
Discharge: HOME | End: 2023-03-09
Attending: FAMILY MEDICINE
Payer: MEDICARE

## 2023-03-09 PROCEDURE — 97110 THERAPEUTIC EXERCISES: CPT | Mod: GP,PO

## 2023-03-09 PROCEDURE — 97140 MANUAL THERAPY 1/> REGIONS: CPT | Mod: GP,PO

## 2023-03-13 ENCOUNTER — HOSPITAL ENCOUNTER (OUTPATIENT)
Dept: PHYSICAL THERAPY | Facility: OTHER | Age: 73
Setting detail: THERAPIES SERIES
Discharge: HOME | End: 2023-03-13
Attending: FAMILY MEDICINE
Payer: MEDICARE

## 2023-03-13 PROCEDURE — 97110 THERAPEUTIC EXERCISES: CPT | Mod: GP,PO

## 2023-03-13 PROCEDURE — 97140 MANUAL THERAPY 1/> REGIONS: CPT | Mod: GP,PO

## 2023-03-15 ENCOUNTER — HOSPITAL ENCOUNTER (OUTPATIENT)
Dept: PHYSICAL THERAPY | Facility: OTHER | Age: 73
Setting detail: THERAPIES SERIES
Discharge: HOME | End: 2023-03-15
Attending: FAMILY MEDICINE
Payer: MEDICARE

## 2023-03-15 PROCEDURE — 97110 THERAPEUTIC EXERCISES: CPT | Mod: GP,CQ

## 2023-03-15 PROCEDURE — 97140 MANUAL THERAPY 1/> REGIONS: CPT | Mod: GP,CQ

## 2023-03-21 ENCOUNTER — HOSPITAL ENCOUNTER (OUTPATIENT)
Dept: PHYSICAL THERAPY | Facility: OTHER | Age: 73
Setting detail: THERAPIES SERIES
Discharge: HOME | End: 2023-03-21
Attending: FAMILY MEDICINE
Payer: MEDICARE

## 2023-03-21 PROCEDURE — 97140 MANUAL THERAPY 1/> REGIONS: CPT | Mod: GP,CQ

## 2023-03-21 PROCEDURE — 97110 THERAPEUTIC EXERCISES: CPT | Mod: GP,CQ

## 2023-03-23 ENCOUNTER — HOSPITAL ENCOUNTER (OUTPATIENT)
Dept: PHYSICAL THERAPY | Facility: OTHER | Age: 73
Setting detail: THERAPIES SERIES
Discharge: HOME | End: 2023-03-23
Attending: FAMILY MEDICINE
Payer: MEDICARE

## 2023-03-23 PROCEDURE — 97110 THERAPEUTIC EXERCISES: CPT | Mod: GP,PO

## 2023-03-23 PROCEDURE — 97140 MANUAL THERAPY 1/> REGIONS: CPT | Mod: GP,PO

## 2023-03-31 ENCOUNTER — HOSPITAL ENCOUNTER (OUTPATIENT)
Dept: PHYSICAL THERAPY | Facility: OTHER | Age: 73
Setting detail: THERAPIES SERIES
Discharge: HOME | End: 2023-03-31
Attending: FAMILY MEDICINE
Payer: MEDICARE

## 2023-03-31 PROCEDURE — 97110 THERAPEUTIC EXERCISES: CPT | Mod: GP,CQ

## 2023-03-31 PROCEDURE — 97140 MANUAL THERAPY 1/> REGIONS: CPT | Mod: GP,CQ

## 2023-04-25 ENCOUNTER — HOSPITAL ENCOUNTER (OUTPATIENT)
Dept: PHYSICAL THERAPY | Facility: OTHER | Age: 73
Setting detail: THERAPIES SERIES
Discharge: HOME | End: 2023-04-25
Attending: FAMILY MEDICINE
Payer: MEDICARE

## 2023-04-25 PROCEDURE — 97110 THERAPEUTIC EXERCISES: CPT | Mod: GP,PO

## 2023-04-25 PROCEDURE — 97140 MANUAL THERAPY 1/> REGIONS: CPT | Mod: GP,PO

## 2023-04-25 NOTE — PROGRESS NOTES
[facility]    OUTPATIENT PHYSICAL THERAPY  PLAN OF TREATMENT FOR OUTPATIENT REHABILITATION AND PROGRESS NOTE           Patient's Last Name, First Name, Arlyn Baez Date of Birth  1950   Provider's Name  [facility] Medical Record No.  [id number]    Onset Date  12/25/22 Start of Care Date  3/1/23   Type:     _X_PT   ___OT   ___SLP Medical Diagnosis  Lumbar radiculopathy (M54.16)   PT Diagnosis  Impaired mobility, decreased strength and endurance, low back pain with radicular symptoms Plan of Treatment  Frequency/Duration: 1-2 times per week for an additional 4-6 weeks  Certification date from 4/25/23 to 6/6/23     Goals:  Goal Identifier Reaching   Goal Description Patient will be able to reach overhead with both hands and no increase in back or leg pain in order to get items in an upper cupboard.   Target Date 04/26/23   Date Met      Progress (detail required for progress note): Progressing: patient still feels that she is having trouble with this. She will test is more in the upcoming weeks because she is going to have to clean out her upper cupboards and fans. She feels it has improved but still will have a little discomfort with it.     Goal Identifier Lifting   Goal Description Patient will be able to lift her great grand child  with no increase in back or leg symptoms in order to improve her overall function at home.   Target Date 04/26/23   Date Met      Progress (detail required for progress note): Progressing well. Still feels this is an activity that will likely increase her pain.     Goal Identifier Sitting   Goal Description Patient will be able to sit for longer than 60 minutes with no increase in back or leg pain in order to improve her safety and efficiency with travel in a vehicle.   Target Date 04/26/23   Date Met      Progress (detail  required for progress note): Still having trouble with sitting for longer than 60 minutes and then standing back up. Reports that she feels she will always have trouble with this activity         Beginning/End Dates of Progress Note Reporting Period:  3/1/23 to 4/25/23    Client Self (Subjective) Report for Progress Note Reporting Period: Reports that she is doing pretty well today. Overall, she reports that her pain has improved and the intensity when it does arise is not as intense. Still has trouble with the heavier lifting and movement at home. She feels this will be the case for quite a while.  Objective Measurements:   Objective Measure: Subjective pain rating  Details: 0/10          I CERTIFY THE NEED FOR THESE SERVICES FURNISHED UNDER        THIS PLAN OF TREATMENT AND WHILE UNDER MY CARE     (Physician co-signature of this document indicates review and certification of the therapy plan).                Referring Provider: Brooke Castaneda MD Justin Peratalo, PT

## 2023-06-05 NOTE — TELEPHONE ENCOUNTER
Lump on left quad area red and sore to touch-Would like work in on [employer]   Tried to call pt, had to lm

## 2023-06-20 NOTE — PROGRESS NOTES
04/25/23 0800   Appointment Info   Signing clinician's name / credentials Derrell Gil DPT   Visits Used 10   Progress Note/Certification   Progress Note Due Date 04/26/23   Progress Note Completed Date 04/25/23   Supervision   PT Assistant Visit Number 4   Subjective Report   Subjective Report Reports that she is doing pretty well today. Overall, she reports that her pain has improved and the intensity when it does arise is not as intense. Still has trouble with the heavier lifting and movement at home. She feels this will be the case for quite a while.   Objective Measures   Objective Measures Objective Measure 1   Objective Measure 1   Objective Measure Subjective pain rating   Details 0/10   PT Modalities   PT Modalities Hydrocollator Packs   Hot Pack   Hot Pack Minutes (07927) 0   Skilled Intervention Indicated to improve mobility and reduce pain   Patient Response/Progress Pt thought it really helped relax her muscles   Treatment Detail Hydrocollator pack to patient's UBN while hooklying and then in seated during STM.   Progress No charge   Treatment Interventions (PT)   Interventions Therapeutic Procedure/Exercise;Manual Therapy   Therapeutic Procedure/Exercise   Therapeutic Procedures: strength, endurance, ROM, flexibillity minutes (47300) 30   Skilled Intervention Indicated to improve mobility and reduce pain   Patient Response/Progress positive   Manual Therapy   Manual Therapy: Mobilization, MFR, MLD, friction massage minutes (42437) 10   Skilled Intervention Indicated to improve mobility and reduce pain   Patient Response/Progress Tolerated well   Treatment Detail STM/MFR to R lumbar paraspinals in L sidelying   Education   Learner/Method Patient   Plan   Home program Lumbar rotation: 2 minutes, twice daily, lumbar flexion with arms on table/stool: x10, twice daily, PPT: 2x10, twice daily, 90/90 sciatic nerve glides: x10 bilaterally, twice daily   Plan for next session Assess response to HEP,  progress mobility and strength as able, core strengthening   Medicare Claim Information   Medical Diagnosis Lumbar radiculopathy (M54.16)   PT Diagnosis Impaired mobility, decreased strength and endurance, low back pain with radicular symptoms   Start of Care Date 03/01/23   Onset date of current episode/exacerbation 12/25/22  (Notes that she has always had pain but this was a rough day for her.)   Certification date from 03/01/23   Ortho Goal 1   Goal Identifier Reaching   Goal Description Patient will be able to reach overhead with both hands and no increase in back or leg pain in order to get items in an upper cupboard.   Target Date 04/26/23   Goal Progress Progressing: patient still feels that she is having trouble with this. She will test is more in the upcoming weeks because she is going to have to clean out her upper cupboards and fans. She feels it has improved but still will have a little discomfort with it.   Ortho Goal 2   Goal Identifier Lifting   Goal Description Patient will be able to lift her great grand child  with no increase in back or leg symptoms in order to improve her overall function at home.   Target Date 04/26/23   Goal Progress Progressing well. Still feels this is an activity that will likely increase her pain.   Ortho Goal 3   Goal Identifier Sitting   Goal Description Patient will be able to sit for longer than 60 minutes with no increase in back or leg pain in order to improve her safety and efficiency with travel in a vehicle.   Target Date 04/26/23   Goal Progress Still having trouble with sitting for longer than 60 minutes and then standing back up. Reports that she feels she will always have trouble with this activity   Session Number   Additional Session Number 10/10         DISCHARGE  Reason for Discharge: No further visits were scheduled    Equipment Issued: none    Discharge Plan: Patient to continue home program. Follow up with physician as needed    Referring Provider:  Brooke  Anastasiya Castaneda

## 2023-06-21 DIAGNOSIS — E78.00 PURE HYPERCHOLESTEROLEMIA: ICD-10-CM

## 2023-06-21 DIAGNOSIS — I10 ESSENTIAL HYPERTENSION: ICD-10-CM

## 2023-06-21 DIAGNOSIS — E87.6 HYPOKALEMIA: ICD-10-CM

## 2023-06-26 RX ORDER — HYDROCHLOROTHIAZIDE 25 MG/1
25 TABLET ORAL DAILY
Qty: 90 TABLET | Refills: 0 | Status: SHIPPED | OUTPATIENT
Start: 2023-06-26 | End: 2023-09-14

## 2023-06-26 RX ORDER — ATORVASTATIN CALCIUM 20 MG/1
20 TABLET, FILM COATED ORAL DAILY
Qty: 90 TABLET | Refills: 0 | Status: SHIPPED | OUTPATIENT
Start: 2023-06-26 | End: 2023-09-14

## 2023-06-26 RX ORDER — POTASSIUM CHLORIDE 1500 MG/1
20 TABLET, EXTENDED RELEASE ORAL DAILY
Qty: 90 TABLET | Refills: 0 | Status: SHIPPED | OUTPATIENT
Start: 2023-06-26 | End: 2023-09-14

## 2023-06-26 NOTE — TELEPHONE ENCOUNTER
Routing refill request to provider for review/approval because:    LOV; 5/6/22  Patient due for annual review   Will route to unit 4 scheduling to call patient and help assist in scheduling appointment.    Astrid Reyna RN on 6/26/2023 at 3:25 PM

## 2023-06-30 DIAGNOSIS — E87.6 HYPOKALEMIA: ICD-10-CM

## 2023-07-05 RX ORDER — POTASSIUM CHLORIDE 1500 MG/1
TABLET, EXTENDED RELEASE ORAL
Qty: 90 TABLET | Refills: 0 | OUTPATIENT
Start: 2023-07-05

## 2023-07-05 NOTE — TELEPHONE ENCOUNTER
potassium chloride ER (KLOR-CON M) 20 MEQ CR tablet 90 tablet 0 6/26/2023  No   Sig - Route: TAKE 1 TABLET (20 MEQ) BY MOUTH DAILY      To [facility]   [facility] requesting.  Astrid Reyna RN on 7/5/2023 at 2:45 PM

## 2023-09-14 ENCOUNTER — OFFICE VISIT (OUTPATIENT)
Dept: FAMILY MEDICINE | Facility: OTHER | Age: 73
End: 2023-09-14
Attending: FAMILY MEDICINE
Payer: COMMERCIAL

## 2023-09-14 VITALS
OXYGEN SATURATION: 97 % | DIASTOLIC BLOOD PRESSURE: 80 MMHG | HEART RATE: 71 BPM | RESPIRATION RATE: 16 BRPM | WEIGHT: 150.6 LBS | HEIGHT: 58 IN | SYSTOLIC BLOOD PRESSURE: 122 MMHG | BODY MASS INDEX: 31.61 KG/M2 | TEMPERATURE: 98 F

## 2023-09-14 DIAGNOSIS — Z13.0 SCREENING FOR DEFICIENCY ANEMIA: ICD-10-CM

## 2023-09-14 DIAGNOSIS — Z00.00 ENCOUNTER FOR MEDICARE ANNUAL WELLNESS EXAM: Primary | ICD-10-CM

## 2023-09-14 DIAGNOSIS — Z23 NEEDS FLU SHOT: ICD-10-CM

## 2023-09-14 DIAGNOSIS — I77.810 ASCENDING AORTA DILATATION (H): ICD-10-CM

## 2023-09-14 DIAGNOSIS — Z13.29 SCREENING FOR THYROID DISORDER: ICD-10-CM

## 2023-09-14 DIAGNOSIS — Z12.31 VISIT FOR SCREENING MAMMOGRAM: ICD-10-CM

## 2023-09-14 DIAGNOSIS — E87.6 HYPOKALEMIA: ICD-10-CM

## 2023-09-14 DIAGNOSIS — I10 ESSENTIAL HYPERTENSION: ICD-10-CM

## 2023-09-14 DIAGNOSIS — E78.00 PURE HYPERCHOLESTEROLEMIA: ICD-10-CM

## 2023-09-14 DIAGNOSIS — Z23 NEED FOR SHINGLES VACCINE: ICD-10-CM

## 2023-09-14 DIAGNOSIS — Z11.59 NEED FOR HEPATITIS C SCREENING TEST: ICD-10-CM

## 2023-09-14 DIAGNOSIS — Z23 NEED FOR TDAP VACCINATION: ICD-10-CM

## 2023-09-14 LAB
ALBUMIN SERPL BCG-MCNC: 4.6 G/DL (ref 3.5–5.2)
ALP SERPL-CCNC: 75 U/L (ref 35–104)
ALT SERPL W P-5'-P-CCNC: 26 U/L (ref 0–50)
ANION GAP SERPL CALCULATED.3IONS-SCNC: 8 MMOL/L (ref 7–15)
AST SERPL W P-5'-P-CCNC: 23 U/L (ref 0–45)
BASOPHILS # BLD AUTO: 0.1 10E3/UL (ref 0–0.2)
BASOPHILS NFR BLD AUTO: 2 %
BILIRUB SERPL-MCNC: 0.4 MG/DL
BUN SERPL-MCNC: 19.6 MG/DL (ref 8–23)
CALCIUM SERPL-MCNC: 9.7 MG/DL (ref 8.8–10.2)
CHLORIDE SERPL-SCNC: 103 MMOL/L (ref 98–107)
CHOLEST SERPL-MCNC: 142 MG/DL
CREAT SERPL-MCNC: 0.8 MG/DL (ref 0.51–0.95)
DEPRECATED HCO3 PLAS-SCNC: 30 MMOL/L (ref 22–29)
EGFRCR SERPLBLD CKD-EPI 2021: 76 ML/MIN/1.73M2
EOSINOPHIL # BLD AUTO: 0.3 10E3/UL (ref 0–0.7)
EOSINOPHIL NFR BLD AUTO: 4 %
ERYTHROCYTE [DISTWIDTH] IN BLOOD BY AUTOMATED COUNT: 13.8 % (ref 10–15)
GLUCOSE SERPL-MCNC: 101 MG/DL (ref 70–99)
HCT VFR BLD AUTO: 41.8 % (ref 35–47)
HDLC SERPL-MCNC: 51 MG/DL
HGB BLD-MCNC: 14 G/DL (ref 11.7–15.7)
IMM GRANULOCYTES # BLD: 0 10E3/UL
IMM GRANULOCYTES NFR BLD: 0 %
LDLC SERPL CALC-MCNC: 66 MG/DL
LYMPHOCYTES # BLD AUTO: 2.2 10E3/UL (ref 0.8–5.3)
LYMPHOCYTES NFR BLD AUTO: 32 %
MCH RBC QN AUTO: 30.1 PG (ref 26.5–33)
MCHC RBC AUTO-ENTMCNC: 33.5 G/DL (ref 31.5–36.5)
MCV RBC AUTO: 90 FL (ref 78–100)
MONOCYTES # BLD AUTO: 0.8 10E3/UL (ref 0–1.3)
MONOCYTES NFR BLD AUTO: 12 %
NEUTROPHILS # BLD AUTO: 3.5 10E3/UL (ref 1.6–8.3)
NEUTROPHILS NFR BLD AUTO: 50 %
NONHDLC SERPL-MCNC: 91 MG/DL
NRBC # BLD AUTO: 0 10E3/UL
NRBC BLD AUTO-RTO: 0 /100
PLATELET # BLD AUTO: 258 10E3/UL (ref 150–450)
POTASSIUM SERPL-SCNC: 3.7 MMOL/L (ref 3.4–5.3)
PROT SERPL-MCNC: 7.4 G/DL (ref 6.4–8.3)
RBC # BLD AUTO: 4.65 10E6/UL (ref 3.8–5.2)
SODIUM SERPL-SCNC: 141 MMOL/L (ref 136–145)
TRIGL SERPL-MCNC: 124 MG/DL
TSH SERPL DL<=0.005 MIU/L-ACNC: 3.46 UIU/ML (ref 0.3–4.2)
WBC # BLD AUTO: 6.9 10E3/UL (ref 4–11)

## 2023-09-14 PROCEDURE — 86803 HEPATITIS C AB TEST: CPT | Mod: ZL | Performed by: FAMILY MEDICINE

## 2023-09-14 PROCEDURE — 84443 ASSAY THYROID STIM HORMONE: CPT | Mod: ZL | Performed by: FAMILY MEDICINE

## 2023-09-14 PROCEDURE — G0008 ADMIN INFLUENZA VIRUS VAC: HCPCS

## 2023-09-14 PROCEDURE — 36415 COLL VENOUS BLD VENIPUNCTURE: CPT | Mod: ZL | Performed by: FAMILY MEDICINE

## 2023-09-14 PROCEDURE — 85018 HEMOGLOBIN: CPT | Mod: ZL | Performed by: FAMILY MEDICINE

## 2023-09-14 PROCEDURE — 80061 LIPID PANEL: CPT | Mod: ZL | Performed by: FAMILY MEDICINE

## 2023-09-14 PROCEDURE — G0439 PPPS, SUBSEQ VISIT: HCPCS | Performed by: FAMILY MEDICINE

## 2023-09-14 PROCEDURE — 80053 COMPREHEN METABOLIC PANEL: CPT | Mod: ZL | Performed by: FAMILY MEDICINE

## 2023-09-14 RX ORDER — ATORVASTATIN CALCIUM 20 MG/1
20 TABLET, FILM COATED ORAL DAILY
Qty: 90 TABLET | Refills: 3 | Status: SHIPPED | OUTPATIENT
Start: 2023-09-14 | End: 2024-09-19

## 2023-09-14 RX ORDER — TOBRAMYCIN AND DEXAMETHASONE 3; 1 MG/ML; MG/ML
SUSPENSION/ DROPS OPHTHALMIC
COMMUNITY
Start: 2023-08-25

## 2023-09-14 RX ORDER — POTASSIUM CHLORIDE 1500 MG/1
20 TABLET, EXTENDED RELEASE ORAL DAILY
Qty: 90 TABLET | Refills: 3 | Status: SHIPPED | OUTPATIENT
Start: 2023-09-14 | End: 2024-09-19

## 2023-09-14 RX ORDER — HYDROCHLOROTHIAZIDE 25 MG/1
25 TABLET ORAL DAILY
Qty: 90 TABLET | Refills: 3 | Status: SHIPPED | OUTPATIENT
Start: 2023-09-14 | End: 2024-09-19

## 2023-09-14 ASSESSMENT — ENCOUNTER SYMPTOMS
MYALGIAS: 1
WEAKNESS: 1
NERVOUS/ANXIOUS: 1
PARESTHESIAS: 1

## 2023-09-14 ASSESSMENT — PAIN SCALES - GENERAL: PAINLEVEL: NO PAIN (0)

## 2023-09-14 ASSESSMENT — ACTIVITIES OF DAILY LIVING (ADL): CURRENT_FUNCTION: NO ASSISTANCE NEEDED

## 2023-09-14 NOTE — PROGRESS NOTES
"The patient was provided with written information regarding signs of hearing loss.  Information on urinary incontinence and treatment options given to patient.Answers submitted by the patient for this visit:  Annual Preventive Visit (Submitted on 9/14/2023)  Chief Complaint: Annual Exam:  In general, how would you rate your overall physical health?: good  Frequency of exercise:: 4-5 days/week  Do you usually eat at least 4 servings of fruit and vegetables a day, include whole grains & fiber, and avoid regularly eating high fat or \"junk\" foods? : Yes  Taking medications regularly:: Yes  Medication side effects:: Not applicable  Activities of Daily Living: no assistance needed  Home safety: no safety concerns identified  Hearing Impairment:: difficulty understanding speech on the telephone  In the past 6 months, have you been bothered by leaking of urine?: Yes  nervous/anxious: Yes  myalgias: Yes  Skin sensation changes: Yes  weakness: Yes  In general, how would you rate your overall mental or emotional health?: good  Additional concerns today:: Yes  Exercise outside of work (Submitted on 9/14/2023)  Chief Complaint: Annual Exam:  Duration of exercise:: 15-30 minutes    "

## 2023-09-14 NOTE — PROGRESS NOTES
"SUBJECTIVE:   Arlyn is a 73 year old who presents for Preventive Visit.      9/14/2023    10:58 AM   Additional Questions   Roomed by Ambreen Pederson     Arlyn is here today for a medicare wellness visit.    Are you in the first 12 months of your Medicare coverage?  No    Healthy Habits:     In general, how would you rate your overall health?  Good    Frequency of exercise:  4-5 days/week    Duration of exercise:  15-30 minutes    Do you usually eat at least 4 servings of fruit and vegetables a day, include whole grains    & fiber and avoid regularly eating high fat or \"junk\" foods?  Yes    Taking medications regularly:  Yes    Medication side effects:  Not applicable    Ability to successfully perform activities of daily living:  No assistance needed    Home Safety:  No safety concerns identified    Hearing Impairment:  Difficulty understanding speech on the telephone    In the past 6 months, have you been bothered by leaking of urine? Yes    In general, how would you rate your overall mental or emotional health?  Good    Additional concerns today:  Yes        Have you ever done Advance Care Planning? (For example, a Health Directive, POLST, or a discussion with a medical provider or your loved ones about your wishes): Yes, patient states has an Advance Care Planning document and will bring a copy to the clinic.       Fall risk  Fallen 2 or more times in the past year?: No  Any fall with injury in the past year?: No    Cognitive Screening   1) Repeat 3 items (Leader, Season, Table)    2) Clock draw: NORMAL  3) 3 item recall: Recalls 3 objects  Results: 3 items recalled: COGNITIVE IMPAIRMENT LESS LIKELY    Mini-CogTM Copyright MAKI Hicks. Licensed by the author for use in [facility]; reprinted with permission (flavia@.[email or web address]). All rights reserved.      Do you have sleep apnea, excessive snoring or daytime drowsiness? : no    Reviewed and updated as needed this visit by clinical staff   Tobacco  Allergies  " Meds              Reviewed and updated as needed this visit by Provider                 Social History     Tobacco Use    Smoking status: Never     Passive exposure: Yes    Smokeless tobacco: Never    Tobacco comments:     Quit smoking:  was a smoker   Substance Use Topics    Alcohol use: No     Alcohol/week: 0.0 standard drinks of alcohol             9/14/2023    11:01 AM   Alcohol Use   Prescreen: >3 drinks/day or >7 drinks/week? No     Do you have a current opioid prescription? No  Do you use any other controlled substances or medications that are not prescribed by a provider? None              Current providers sharing in care for this patient include:   Patient Care Team:  Brooke Castaneda MD as PCP - General (Family Practice)  Ashish Quigley MD as Assigned Surgical Provider  Brooke Castaneda MD as Assigned PCP    The following health maintenance items are reviewed in Epic and correct as of today:  Health Maintenance   Topic Date Due    COVID-19 Vaccine (1) Never done    HEPATITIS C SCREENING  Never done    ZOSTER IMMUNIZATION (1 of 2) Never done    DTAP/TDAP/TD IMMUNIZATION (3 - Td or Tdap) 12/03/2022    MEDICARE ANNUAL WELLNESS VISIT  05/06/2023    INFLUENZA VACCINE (1) 09/01/2023    MAMMO SCREENING  07/29/2024    FALL RISK ASSESSMENT  09/14/2024    COLORECTAL CANCER SCREENING  03/16/2025    LIPID  05/06/2027    ADVANCE CARE PLANNING  05/06/2027    DEXA  05/01/2034    PHQ-2 (once per calendar year)  Completed    Pneumococcal Vaccine: 65+ Years  Completed    IPV IMMUNIZATION  Aged Out    HPV IMMUNIZATION  Aged Out    MENINGITIS IMMUNIZATION  Aged Out     BP Readings from Last 3 Encounters:   09/14/23 122/80   01/31/23 132/80   01/16/23 138/86    Wt Readings from Last 3 Encounters:   09/14/23 68.3 kg (150 lb 9.6 oz)   01/31/23 68.9 kg (152 lb)   01/16/23 68.9 kg (152 lb)                  Patient Active Problem List   Diagnosis    Arthritis of right wrist    Ascending aorta dilatation  (H)    Enthesopathy of hip region    Carpal tunnel syndrome    Degeneration of cervical intervertebral disc    Chondromalacia of patella    GERD (gastroesophageal reflux disease)    Hyperlipidemia    Hypertension    Osteoarthritis of ankle or foot    Plantar fasciitis    Symptomatic menopausal or female climacteric states    Disorder of bursae and tendons in shoulder region    Other joint derangement, not elsewhere classified, unspecified site    Tendonitis of wrist, right    Lumbosacral spondylosis without myelopathy    Lesion of lateral popliteal nerve    Vaginal vault prolapse, posthysterectomy    Cystocele with rectocele    Erosion of implanted vaginal mesh to surrounding tissue, initial encounter (H)    Abscess of groin    Encounter for follow-up examination    Uterine prolapse     Past Surgical History:   Procedure Laterality Date    ARTHROSCOPY SHOULDER ROTATOR CUFF REPAIR Right 4/11/2018    Procedure: ARTHROSCOPY SHOULDER ROTATOR CUFF REPAIR;  Right Shoulder Arthroscopy with Subacromial Decompression, Distal Clavicle Excision, Rotator Cuff Repair, Biceps Tenotomy, Extensive Debridement;  Surgeon: Wilner Painting DO;  Location:  OR    AS SHOULDER ARTHROSCOPY, DX Right 4/11/18    Dr. Painting    BUNIONECTOMY      1999,right    COLONOSCOPY      2004, normal    COLONOSCOPY  03/16/2015    Normal exam, F/U 2025    COLPORRHAPHY ANTERIOR, POSTERIOR, COMBINED N/A 10/19/2020    Procedure: COLPORRHAPHY, COMBINED ANTEROPOSTERIOR, ENTEROCELE REPAIR, COLPOCLIESIS, Repair eroded mesh;  Surgeon: Wade Reyna MD;  Location:  OR    CYSTOSCOPY N/A 10/19/2020    Procedure: CYSTOSCOPY;  Surgeon: Wade Reyna MD;  Location:  OR    HYSTERECTOMY VAGINAL      1982,for uterine prolapse with A&P repair.  Tube and ovaries still in place.    LAPAROSCOPY DIAGNOSTIC (GYN)      1989,for pelvic adhesion    LAPAROSCOPY DIAGNOSTIC (GYN)      11/08,vaginal vault prolapse - Dr. Mumtaz Marshall - anterior/posterior repair with  bladder mesh.    RELEASE CARPAL TUNNEL      Dr. Méndez    S/P RIGHT SHOULDER ARTHROSCOPY Right 04/11/2018       Social History     Tobacco Use    Smoking status: Never     Passive exposure: Yes    Smokeless tobacco: Never    Tobacco comments:     Quit smoking:  was a smoker   Substance Use Topics    Alcohol use: No     Alcohol/week: 0.0 standard drinks of alcohol     Family History   Problem Relation Age of Onset    Diabetes Father         Diabetes    Hypertension Father         Hypertension    Heart Disease Father         Heart Disease    Other - See Comments Father          hearing loss, kidney problems.    Heart Disease Mother         Heart Disease    Hypertension Mother         Hypertension    Cancer Maternal Grandmother         Cancer,bone cancer in her leg    Other - See Comments Maternal Grandmother         Psychiatric illness,depression    Heart Disease Son         Heart Disease,MI - first at age 48    Breast Cancer Sister         Cancer-breast    Diabetes Sister         Diabetes    Heart Disease Sister         Heart Disease,angioplasty CAD    Other - See Comments Sister         Right ear deafness - surgically repaired    Other - See Comments Sister         depression    Diabetes Sister         Diabetes    Hypertension Sister         Hypertension    Other - See Comments Sister         car accident age 75 with multiple fractures - in nursing home.    Heart Disease Brother         Heart Disease    Heart Disease Brother         Heart Disease    Hypertension Brother         Hypertension    Other - See Comments Brother         vertigo, also kidney problems.         Current Outpatient Medications   Medication Sig Dispense Refill    aspirin (ASA) 81 MG tablet Take by mouth daily 30 tablet     atorvastatin (LIPITOR) 20 MG tablet Take 1 tablet (20 mg) by mouth daily 90 tablet 3    BIOTIN PO Take 6,000 mg by mouth 2 times daily Biotin with Collagen 3 tablets bid      Cyanocobalamin (VITAMIN B-12 CR) 1000  MCG TBCR       cyclobenzaprine (FLEXERIL) 10 MG tablet Take 1 tablet (10 mg) by mouth 3 times daily as needed for muscle spasms 15 tablet 0    hydrochlorothiazide (HYDRODIURIL) 25 MG tablet Take 1 tablet (25 mg) by mouth daily 90 tablet 3    lecithin 400 MG CAPS Take 1 capsule by mouth 2 times daily      Multiple Vitamins-Minerals (MENS MULTIVITAMIN PLUS) TABS  30 tablet     other medical supplies Tdap.  Diagnosis:  Z23. 1 each 0    potassium chloride ER (KLOR-CON M) 20 MEQ CR tablet Take 1 tablet (20 mEq) by mouth daily 90 tablet 3    triamcinolone (KENALOG) 0.1 % external cream Apply topically 2 times daily 30 g 4    UNABLE TO FIND MEDICATION NAME: Arneca cream 2-3 times daily      vitamin E (TOCOPHEROL) 100 UNIT capsule       zinc gluconate 50 MG tablet Take 50 mg by mouth daily      tobramycin-dexAMETHasone (TOBRADEX) 0.3-0.1 % ophthalmic suspension INSTILL 1 DROP RIGHT EYE 4 TIMES A DAY (Patient not taking: Reported on 9/14/2023)       Allergies   Allergen Reactions    Amoxicillin-Pot Clavulanate      Pt states she did not tolerate    Codeine Nausea and Vomiting    Diazepam      Other reaction(s): Hallucinations    Naproxen Other (See Comments)     Mouth sores    Sulfa Antibiotics Unknown    Chlorhexidine Rash    Sulfacetamide Rash     Had a reaction to sulfa drug but is not sure which one it was-states the reaction resulted in redness         Mammogram Screening - Patient over age 75, has elected to continue with screening.  Pertinent mammograms are reviewed under the imaging tab.    Review of Systems   Musculoskeletal:  Positive for myalgias.   Neurological:  Positive for weakness and paresthesias.   Psychiatric/Behavioral:  The patient is nervous/anxious.      Constitutional, HEENT, cardiovascular, pulmonary, GI, , musculoskeletal, neuro, skin, endocrine and psych systems are negative, except as otherwise noted.    OBJECTIVE:   /80   Pulse 71   Temp 98  F (36.7  C) (Tympanic)   Resp 16   Ht 1.473  "m (4' 10\")   Wt 68.3 kg (150 lb 9.6 oz)   LMP  (LMP Unknown)   SpO2 97%   Breastfeeding No   BMI 31.48 kg/m   Estimated body mass index is 31.48 kg/m  as calculated from the following:    Height as of this encounter: 1.473 m (4' 10\").    Weight as of this encounter: 68.3 kg (150 lb 9.6 oz).  Physical Exam  Constitutional:       General: She is not in acute distress.     Appearance: She is well-developed.   HENT:      Head: Normocephalic.      Right Ear: Tympanic membrane and external ear normal.      Left Ear: Tympanic membrane and external ear normal.      Nose: Nose normal.      Mouth/Throat:      Mouth: Mucous membranes are moist.      Pharynx: Oropharynx is clear. No oropharyngeal exudate or posterior oropharyngeal erythema.   Eyes:      General:         Right eye: No discharge.         Left eye: No discharge.      Conjunctiva/sclera: Conjunctivae normal.      Pupils: Pupils are equal, round, and reactive to light.   Neck:      Thyroid: No thyromegaly.      Trachea: No tracheal deviation.   Cardiovascular:      Rate and Rhythm: Normal rate and regular rhythm.      Pulses: Normal pulses.      Heart sounds: Normal heart sounds, S1 normal and S2 normal. No murmur heard.     No friction rub. No gallop. No S3 or S4 sounds.   Pulmonary:      Effort: Pulmonary effort is normal. No respiratory distress.      Breath sounds: Normal breath sounds. No wheezing or rales.      Comments: Breast exam:  No masses palpable bilaterally.  No skin changes, tethering or axillary lymphadenopathy bilaterally.    Abdominal:      General: Bowel sounds are normal. There is no distension.      Palpations: Abdomen is soft. There is no mass.      Tenderness: There is no abdominal tenderness.   Genitourinary:     Comments: Pelvic/Rectal exams deferred per patient.  Musculoskeletal:         General: Normal range of motion.      Cervical back: Neck supple.   Lymphadenopathy:      Cervical: No cervical adenopathy.   Skin:     General: Skin " is warm and dry.      Findings: No rash.   Neurological:      Mental Status: She is alert and oriented to person, place, and time.      Motor: No abnormal muscle tone.      Deep Tendon Reflexes: Reflexes are normal and symmetric.   Psychiatric:         Mood and Affect: Mood normal.         Thought Content: Thought content normal.         Judgment: Judgment normal.               ASSESSMENT / PLAN:       ICD-10-CM    1. Encounter for Medicare annual wellness exam  Z00.00       2. Visit for screening mammogram  Z12.31 MA Screening Bilateral w/ Brad      3. Needs flu shot  Z23 INFLUENZA VACCINE 65+ (FLUZONE HD)      4. Need for Tdap vaccination  Z23 Tdap, tetanus-diptheria-acell pertussis, (BOOSTRIX) 5-2.5-18.5 LF-MCG/0.5 TUNG injection      5. Need for shingles vaccine  Z23 zoster vaccine recombinant adjuvanted (SHINGRIX) injection      6. Screening for deficiency anemia  Z13.0 CBC with Platelets & Differential     CBC with Platelets & Differential      7. Need for hepatitis C screening test  Z11.59 Hepatitis C Screen Reflex to HCV RNA Quant and Genotype     Hepatitis C Screen Reflex to HCV RNA Quant and Genotype      8. Screening for thyroid disorder  Z13.29 TSH with free T4 reflex     TSH with free T4 reflex      9. Pure hypercholesterolemia  E78.00 atorvastatin (LIPITOR) 20 MG tablet     Comprehensive metabolic panel     Lipid Profile     Lipid Profile     Comprehensive metabolic panel      10. Essential hypertension  I10 hydrochlorothiazide (HYDRODIURIL) 25 MG tablet     Comprehensive metabolic panel     Comprehensive metabolic panel      11. Hypokalemia  E87.6 potassium chloride ER (KLOR-CON M) 20 MEQ CR tablet      12. Ascending aorta dilatation (H)  I77.810 Echocardiogram Complete        1.  Mammogram is ordered.  This was last completed 7/29/2022.  AAA screening previously achieved through CT on 9/20/2021 and no AAA was seen at that time.  DEXA scan last completed 5/1/2019 and was normal.  Colonoscopy last  "completed 3/16/2015 and was normal.  No further follow-up due to age greater than 75.  Pap deferred due to age greater than 65.  Order for Tdap sent to pharmacy.  Order for Shingrix sent to pharmacy for patient.  Prevnar and Pneumovax are up-to-date.  She declines COVID booster.    2.  See #1.  3.  Flu shot updated today.  4.  See #1.  5.  See #1.  6.  CBC ordered as above.  7.  Hepatitis C screening as above.  8.  TSH as above.  9.  Lipid profile and comp panel as above.  Refill as above.  10.  Stable.  Labs as above.  Refills as above.  11.  Potassium refilled as above.  12.  Echo ordered as above.      Patient has been advised of split billing requirements and indicates understanding: Yes      COUNSELING:  Reviewed preventive health counseling, as reflected in patient instructions       Regular exercise       Healthy diet/nutrition       Vision screening       Hearing screening       Dental care       Bladder control       Fall risk prevention       Osteoporosis prevention/bone health       Colon cancer screening       Hepatitis C screening      BMI:   Estimated body mass index is 31.48 kg/m  as calculated from the following:    Height as of this encounter: 1.473 m (4' 10\").    Weight as of this encounter: 68.3 kg (150 lb 9.6 oz).         She reports that she has never smoked. She has been exposed to tobacco smoke. She has never used smokeless tobacco.      Appropriate preventive services were discussed with this patient, including applicable screening as appropriate for cardiovascular disease, diabetes, osteopenia/osteoporosis, and glaucoma.  As appropriate for age/gender, discussed screening for colorectal cancer, prostate cancer, breast cancer, and cervical cancer. Checklist reviewing preventive services available has been given to the patient.    Reviewed patients plan of care and provided an AVS. The Basic Care Plan (routine screening as documented in Health Maintenance) for Arlyn meets the Care Plan " requirement. This Care Plan has been established and reviewed with the Patient.          Brooke Castaneda MD  [facility]    Identified Health Risks:  I have reviewed Opioid Use Disorder and Substance Use Disorder risk factors and made any needed referrals.

## 2023-09-14 NOTE — LETTER
"      Arlyn Holder  [address] [place] [address]    9/19/2023      Dear Ms. Holder,      I wanted to let you know about your recent labs.  Your hepatitis C screening test was normal.    Your comprehensive metabolic profile showed that your glucose was 101.  A normal fasting glucose should be under 100.  To meet criteria for diabetes, you would need to have a level of 126 or above.  This means that you are in the \"prediabetes\" category between 100-125.  This means that you do not yet have diabetes, but are at risk for developing it in the future.  Ways you can offset your risks for developing diabetes are to eat healthy (especially minimizing your carbohydrate intake), exercise and maintain a healthy weight.    Your complete blood count, TSH and lipid profile were all in good range.    Please contact us at [phone] with any questions or concerns that you have.    I have attached your lab results for your records.        Sincerely,         Brooke Castaneda MD     Resulted Orders   Lipid Profile   Result Value Ref Range    Cholesterol 142 <200 mg/dL    Triglycerides 124 <150 mg/dL    Direct Measure HDL 51 >=50 mg/dL    LDL Cholesterol Calculated 66 <=100 mg/dL    Non HDL Cholesterol 91 <130 mg/dL    Narrative    Cholesterol  Desirable:  <200 mg/dL    Triglycerides  Normal:  Less than 150 mg/dL  Borderline High:  150-199 mg/dL  High:  200-499 mg/dL  Very High:  Greater than or equal to 500 mg/dL    Direct Measure HDL  Female:  Greater than or equal to 50 mg/dL   Male:  Greater than or equal to 40 mg/dL    LDL Cholesterol  Desirable:  <100mg/dL  Above Desirable:  100-129 mg/dL   Borderline High:  130-159 mg/dL   High:  160-189 mg/dL   Very High:  >= 190 mg/dL    Non HDL Cholesterol  Desirable:  130 mg/dL  Above Desirable:  130-159 mg/dL  Borderline High:  160-189 mg/dL  High:  190-219 mg/dL  Very High:  Greater than or equal to 220 mg/dL   TSH with free T4 reflex   Result Value Ref Range    TSH 3.46 0.30 " - 4.20 uIU/mL   Comprehensive metabolic panel   Result Value Ref Range    Sodium 141 136 - 145 mmol/L    Potassium 3.7 3.4 - 5.3 mmol/L    Chloride 103 98 - 107 mmol/L    Carbon Dioxide (CO2) 30 (H) 22 - 29 mmol/L    Anion Gap 8 7 - 15 mmol/L    Urea Nitrogen 19.6 8.0 - 23.0 mg/dL    Creatinine 0.80 0.51 - 0.95 mg/dL    Calcium 9.7 8.8 - 10.2 mg/dL    Glucose 101 (H) 70 - 99 mg/dL    Alkaline Phosphatase 75 35 - 104 U/L    AST 23 0 - 45 U/L      Comment:      Reference intervals for this test were updated on 6/12/2023 to more accurately reflect our healthy population. There may be differences in the flagging of prior results with similar values performed with this method. Interpretation of those prior results can be made in the context of the updated reference intervals.    ALT 26 0 - 50 U/L      Comment:      Reference intervals for this test were updated on 6/12/2023 to more accurately reflect our healthy population. There may be differences in the flagging of prior results with similar values performed with this method. Interpretation of those prior results can be made in the context of the updated reference intervals.      Protein Total 7.4 6.4 - 8.3 g/dL    Albumin 4.6 3.5 - 5.2 g/dL    Bilirubin Total 0.4 <=1.2 mg/dL    GFR Estimate 76 >60 mL/min/1.73m2   Hepatitis C Screen Reflex to HCV RNA Quant and Genotype   Result Value Ref Range    Hepatitis C Antibody Nonreactive Nonreactive    Narrative    Assay performance characteristics have not been established for newborns, infants, and children.   CBC with platelets and differential   Result Value Ref Range    WBC Count 6.9 4.0 - 11.0 10e3/uL    RBC Count 4.65 3.80 - 5.20 10e6/uL    Hemoglobin 14.0 11.7 - 15.7 g/dL    Hematocrit 41.8 35.0 - 47.0 %    MCV 90 78 - 100 fL    MCH 30.1 26.5 - 33.0 pg    MCHC 33.5 31.5 - 36.5 g/dL    RDW 13.8 10.0 - 15.0 %    Platelet Count 258 150 - 450 10e3/uL    % Neutrophils 50 %    % Lymphocytes 32 %    % Monocytes 12 %    %  Eosinophils 4 %    % Basophils 2 %    % Immature Granulocytes 0 %    NRBCs per 100 WBC 0 <1 /100    Absolute Neutrophils 3.5 1.6 - 8.3 10e3/uL    Absolute Lymphocytes 2.2 0.8 - 5.3 10e3/uL    Absolute Monocytes 0.8 0.0 - 1.3 10e3/uL    Absolute Eosinophils 0.3 0.0 - 0.7 10e3/uL    Absolute Basophils 0.1 0.0 - 0.2 10e3/uL    Absolute Immature Granulocytes 0.0 <=0.4 10e3/uL    Absolute NRBCs 0.0 10e3/uL

## 2023-09-14 NOTE — NURSING NOTE
Chief Complaint   Patient presents with    Wellness Visit         Medication Reconciliation: complete    Ambreen Pederson, LPN

## 2023-09-14 NOTE — PATIENT INSTRUCTIONS
Patient Education   Personalized Prevention Plan  You are due for the preventive services outlined below.  Your care team is available to assist you in scheduling these services.  If you have already completed any of these items, please share that information with your care team to update in your medical record.  Health Maintenance Due   Topic Date Due     COVID-19 Vaccine (1) Never done     Hepatitis C Screening  Never done     Zoster (Shingles) Vaccine (1 of 2) Never done     Diptheria Tetanus Pertussis (DTAP/TDAP/TD) Vaccine (3 - Td or Tdap) 12/03/2022     Annual Wellness Visit  05/06/2023     Flu Vaccine (1) 09/01/2023     Hearing Loss: Care Instructions  Overview     Hearing loss is a sudden or slow decrease in how well you hear. It can range from slight to profound. Permanent hearing loss can occur with aging. It also can happen when you are exposed long-term to loud noise. Examples include listening to loud music, riding motorcycles, or being around other loud machines.  Hearing loss can affect your work and home life. It can make you feel lonely or depressed. You may feel that you have lost your independence. But hearing aids and other devices can help you hear better and feel connected to others.  Follow-up care is a key part of your treatment and safety. Be sure to make and go to all appointments, and call your doctor if you are having problems. It's also a good idea to know your test results and keep a list of the medicines you take.  How can you care for yourself at home?  Avoid loud noises whenever possible. This helps keep your hearing from getting worse.  Always wear hearing protection around loud noises.  Wear a hearing aid as directed.  A professional can help you pick a hearing aid that will work best for you.  You can also get hearing aids over the counter for mild to moderate hearing loss.  Have hearing tests as your doctor suggests. They can show whether your hearing has changed. Your hearing  "aid may need to be adjusted.  Use other devices as needed. These may include:  Telephone amplifiers and hearing aids that can connect to a television, stereo, radio, or microphone.  Devices that use lights or vibrations. These alert you to the doorbell, a ringing telephone, or a baby monitor.  Television closed-captioning. This shows the words at the bottom of the screen. Most new TVs can do this.  TTY (text telephone). This lets you type messages back and forth on the telephone instead of talking or listening. These devices are also called TDD. When messages are typed on the keyboard, they are sent over the phone line to a receiving TTY. The message is shown on a monitor.  Use text messaging, social media, and email if it is hard for you to communicate by telephone.  Try to learn a listening technique called speechreading. It is not lipreading. You pay attention to people's gestures, expressions, posture, and tone of voice. These clues can help you understand what a person is saying. Face the person you are talking to, and have them face you. Make sure the lighting is good. You need to see the other person's face clearly.  Think about counseling if you need help to adjust to your hearing loss.  When should you call for help?  Watch closely for changes in your health, and be sure to contact your doctor if:    You think your hearing is getting worse.     You have new symptoms, such as dizziness or nausea.   Where can you learn more?  Go to https://www.[employer].net/patiented  Enter R798 in the search box to learn more about \"Hearing Loss: Care Instructions.\"  Current as of: March 1, 2023               Content Version: 13.7    5213-9256 [employer].   Care instructions adapted under license by your healthcare professional. If you have questions about a medical condition or this instruction, always ask your healthcare professional. [employer] disclaims any warranty or liability for your use " of this information.      Bladder Training: Care Instructions  Your Care Instructions     Bladder training is used to treat urge incontinence and stress incontinence. Urge incontinence means that the need to urinate comes on so fast that you can't get to a toilet in time. Stress incontinence means that you leak urine because of pressure on your bladder. For example, it may happen when you laugh, cough, or lift something heavy.  Bladder training can increase how long you can wait before you have to urinate. It can also help your bladder hold more urine. And it can give you better control over the urge to urinate.  It is important to remember that bladder training takes a few weeks to a few months to make a difference. You may not see results right away, but don't give up.  Follow-up care is a key part of your treatment and safety. Be sure to make and go to all appointments, and call your doctor if you are having problems. It's also a good idea to know your test results and keep a list of the medicines you take.  How can you care for yourself at home?  Work with your doctor to come up with a bladder training program that is right for you. You may use one or more of the following methods.  Delayed urination  In the beginning, try to keep from urinating for 5 minutes after you first feel the need to go.  While you wait, take deep, slow breaths to relax. Kegel exercises can also help you delay the need to go to the bathroom.  After some practice, when you can easily wait 5 minutes to urinate, try to wait 10 minutes before you urinate.  Slowly increase the waiting period until you are able to control when you have to urinate.  Scheduled urination  Empty your bladder when you first wake up in the morning.  Schedule times throughout the day when you will urinate.  Start by going to the bathroom every hour, even if you don't need to go.  Slowly increase the time between trips to the bathroom.  When you have found a schedule  "that works well for you, keep doing it.  If you wake up during the night and have to urinate, do it. Apply your schedule to waking hours only.  Kegel exercises  These tighten and strengthen pelvic muscles, which can help you control the flow of urine. (If doing these exercises causes pain, stop doing them and talk with your doctor.) To do Kegel exercises:  Squeeze your muscles as if you were trying not to pass gas. Or squeeze your muscles as if you were stopping the flow of urine. Your belly, legs, and buttocks shouldn't move.  Hold the squeeze for 3 seconds, then relax for 5 to 10 seconds.  Start with 3 seconds, then add 1 second each week until you are able to squeeze for 10 seconds.  Repeat the exercise 10 times a session. Do 3 to 8 sessions a day.  When should you call for help?  Watch closely for changes in your health, and be sure to contact your doctor if:    Your incontinence is getting worse.     You do not get better as expected.   Where can you learn more?  Go to https://www.[employer].net/patiented  Enter V684 in the search box to learn more about \"Bladder Training: Care Instructions.\"  Current as of: March 1, 2023               Content Version: 13.7 2006-2023 [employer].   Care instructions adapted under license by your healthcare professional. If you have questions about a medical condition or this instruction, always ask your healthcare professional. [employer] disclaims any warranty or liability for your use of this information.         "

## 2023-09-18 LAB — HCV AB SERPL QL IA: NONREACTIVE

## 2023-11-24 ENCOUNTER — HOSPITAL ENCOUNTER (OUTPATIENT)
Dept: CARDIOLOGY | Facility: OTHER | Age: 73
Discharge: HOME | End: 2023-11-24
Attending: FAMILY MEDICINE
Payer: MEDICARE

## 2023-11-24 ENCOUNTER — HOSPITAL ENCOUNTER (OUTPATIENT)
Dept: MAMMOGRAPHY | Facility: OTHER | Age: 73
Discharge: HOME | End: 2023-11-24
Attending: FAMILY MEDICINE
Payer: MEDICARE

## 2023-11-24 DIAGNOSIS — Z12.31 VISIT FOR SCREENING MAMMOGRAM: ICD-10-CM

## 2023-11-24 DIAGNOSIS — I77.810 ASCENDING AORTA DILATATION (H): ICD-10-CM

## 2023-11-24 LAB — LVEF ECHO: NORMAL

## 2023-11-24 PROCEDURE — 93306 TTE W/DOPPLER COMPLETE: CPT

## 2023-11-24 PROCEDURE — 93306 TTE W/DOPPLER COMPLETE: CPT | Mod: 26 | Performed by: INTERNAL MEDICINE

## 2023-11-24 PROCEDURE — 77067 SCR MAMMO BI INCL CAD: CPT

## 2023-11-27 ENCOUNTER — TELEPHONE (OUTPATIENT)
Dept: FAMILY MEDICINE | Facility: OTHER | Age: 73
End: 2023-11-27
Payer: COMMERCIAL

## 2024-09-17 DIAGNOSIS — E87.6 HYPOKALEMIA: ICD-10-CM

## 2024-09-17 DIAGNOSIS — I10 ESSENTIAL HYPERTENSION: ICD-10-CM

## 2024-09-17 DIAGNOSIS — E78.00 PURE HYPERCHOLESTEROLEMIA: ICD-10-CM

## 2024-09-19 RX ORDER — HYDROCHLOROTHIAZIDE 25 MG/1
25 TABLET ORAL DAILY
Qty: 90 TABLET | Refills: 0 | Status: SHIPPED | OUTPATIENT
Start: 2024-09-19

## 2024-09-19 RX ORDER — POTASSIUM CHLORIDE 1500 MG/1
20 TABLET, EXTENDED RELEASE ORAL DAILY
Qty: 90 TABLET | Refills: 0 | Status: SHIPPED | OUTPATIENT
Start: 2024-09-19

## 2024-09-19 RX ORDER — ATORVASTATIN CALCIUM 20 MG/1
20 TABLET, FILM COATED ORAL DAILY
Qty: 90 TABLET | Refills: 0 | Status: SHIPPED | OUTPATIENT
Start: 2024-09-19

## 2024-09-19 NOTE — TELEPHONE ENCOUNTER
[facility] pharmacy #[id number] sent Rx request for the following:      Requested Prescriptions   Pending Prescriptions Disp Refills    hydrochlorothiazide (HYDRODIURIL) 25 MG tablet [Pharmacy Med Name: HYDROCHLOROTHIAZIDE 25MG TAB] 90 tablet 3     Sig: TAKE 1 TABLET (25 MG) BY MOUTH DAILY       Diuretics (Including Combos) Protocol Failed - 9/19/2024  4:47 PM        Failed - Blood pressure under 140/90 in past 12 months     BP Readings from Last 3 Encounters:   09/14/23 122/80   01/31/23 132/80   01/16/23 138/86       No data recorded            Failed - Has GFR on file in past 12 months and most recent value is normal        Failed - Recent (12 mo) or future (90 days) visit within the authorizing provider's specialty     The patient must have completed an in-person or virtual visit within the past 12 months or has a future visit scheduled within the next 90 days with the authorizing provider s specialty.  Urgent care and e-visits do not quality as an office visit for this protocol.          Passed - Medication is active on med list        Passed - Medication indicated for associated diagnosis     Medication is associated with one or more of the following diagnoses:     Edema   Hypertension   Heart Failure   Meniere's Disease   Bilateral localized swelling of lower limbs   Pulmonary Hypertension          Passed - Patient is age 18 or older        Passed - No active pregancy on record        Passed - No positive pregnancy test in past 12 months      Last Prescription Date:   9/14/23  Last Fill Qty/Refills:         90, R-3            atorvastatin (LIPITOR) 20 MG tablet [Pharmacy Med Name: ATORVASTATIN 20MG TABLET] 90 tablet 3     Sig: TAKE 1 TABLET (20 MG) BY MOUTH DAILY       Antihyperlipidemic agents Failed - 9/19/2024  4:47 PM        Failed - LDL on file in the past 12 months        Failed - Recent (12 mo) or future (90 days) visit within the authorizing provider's specialty     The patient must have completed an  in-person or virtual visit within the past 12 months or has a future visit scheduled within the next 90 days with the authorizing provider s specialty.  Urgent care and e-visits do not quality as an office visit for this protocol.          Passed - Medication is active on med list        Passed - Patient is age 18 years or older        Passed - No active pregnancy on record        Passed - No positive pregnancy test in past 12 mos      Last Prescription Date:   9/14/23  Last Fill Qty/Refills:         90, R-3            KLOR-CON M20 20 MEQ CR tablet [Pharmacy Med Name: KLOR-CON M20 ER TABLET] 90 tablet 3     Sig: TAKE 1 TABLET (20 MEQ) BY MOUTH DAILY       Potassium Supplements Protocol Failed - 9/19/2024  4:47 PM        Failed - Recent (12 mo) or future (90 days) visit within the authorizing provider's department     The patient must have completed an in-person or virtual visit within the past 12 months or has a future visit scheduled within the next 90 days with the authorizing provider s specialty.  Urgent care and e-visits do not quality as an office visit for this protocol.          Failed - Normal serum potassium in past 12 months     Recent Labs   Lab Test 09/14/23  1148   POTASSIUM 3.7                    Passed - Medication is active on med list        Passed - Medication indicated for associated diagnosis     Potassium is associated with one of the following diagnoses:    Hypokalemia    Hypokalemia prophylaxis   Hypertension   Heart failure   Edema            Passed - Patient is age 18 or older             Last Prescription Date:   9/14/23  Last Fill Qty/Refills:         90, R-3    Last Office Visit:              9/14/23   Future Office visit:            None    Unable to complete prescription refill per RN Medication Refill Policy.     Pt due for annual. Routing to provider for refill consideration. Routing to Unit scheduling pool, to assist Pt in scheduling appointment.       Shane Fritz RN on  9/19/2024 at 4:49 PM

## 2025-02-28 ENCOUNTER — OFFICE VISIT (OUTPATIENT)
Dept: FAMILY MEDICINE | Facility: OTHER | Age: 75
End: 2025-02-28
Attending: FAMILY MEDICINE
Payer: COMMERCIAL

## 2025-02-28 VITALS
HEART RATE: 70 BPM | DIASTOLIC BLOOD PRESSURE: 84 MMHG | WEIGHT: 151.4 LBS | HEIGHT: 58 IN | RESPIRATION RATE: 16 BRPM | SYSTOLIC BLOOD PRESSURE: 136 MMHG | BODY MASS INDEX: 31.78 KG/M2 | TEMPERATURE: 97 F | OXYGEN SATURATION: 98 %

## 2025-02-28 DIAGNOSIS — I10 PRIMARY HYPERTENSION: ICD-10-CM

## 2025-02-28 DIAGNOSIS — K64.4 EXTERNAL HEMORRHOIDS: ICD-10-CM

## 2025-02-28 DIAGNOSIS — M54.16 LUMBAR RADICULOPATHY: ICD-10-CM

## 2025-02-28 DIAGNOSIS — Z13.29 SCREENING FOR THYROID DISORDER: ICD-10-CM

## 2025-02-28 DIAGNOSIS — Z13.0 SCREENING FOR DEFICIENCY ANEMIA: ICD-10-CM

## 2025-02-28 DIAGNOSIS — E87.6 HYPOKALEMIA: ICD-10-CM

## 2025-02-28 DIAGNOSIS — B37.2 YEAST INFECTION OF THE SKIN: ICD-10-CM

## 2025-02-28 DIAGNOSIS — M25.551 HIP PAIN, RIGHT: ICD-10-CM

## 2025-02-28 DIAGNOSIS — Z78.0 POSTMENOPAUSE: ICD-10-CM

## 2025-02-28 DIAGNOSIS — L82.1 SEBORRHEIC KERATOSES: ICD-10-CM

## 2025-02-28 DIAGNOSIS — E78.5 HYPERLIPIDEMIA, UNSPECIFIED HYPERLIPIDEMIA TYPE: ICD-10-CM

## 2025-02-28 DIAGNOSIS — H81.10 BENIGN PAROXYSMAL POSITIONAL VERTIGO, UNSPECIFIED LATERALITY: ICD-10-CM

## 2025-02-28 DIAGNOSIS — Z00.00 ENCOUNTER FOR MEDICARE ANNUAL WELLNESS EXAM: Primary | ICD-10-CM

## 2025-02-28 DIAGNOSIS — Z12.31 VISIT FOR SCREENING MAMMOGRAM: ICD-10-CM

## 2025-02-28 LAB
ALBUMIN SERPL BCG-MCNC: 4.5 G/DL (ref 3.5–5.2)
ALP SERPL-CCNC: 92 U/L (ref 40–150)
ALT SERPL W P-5'-P-CCNC: 26 U/L (ref 0–50)
ANION GAP SERPL CALCULATED.3IONS-SCNC: 11 MMOL/L (ref 7–15)
AST SERPL W P-5'-P-CCNC: 26 U/L (ref 0–45)
BASOPHILS # BLD AUTO: 0.1 10E3/UL (ref 0–0.2)
BASOPHILS NFR BLD AUTO: 1 %
BILIRUB SERPL-MCNC: 0.8 MG/DL
BUN SERPL-MCNC: 21 MG/DL (ref 8–23)
CALCIUM SERPL-MCNC: 9.9 MG/DL (ref 8.8–10.4)
CHLORIDE SERPL-SCNC: 101 MMOL/L (ref 98–107)
CHOLEST SERPL-MCNC: 148 MG/DL
CREAT SERPL-MCNC: 0.85 MG/DL (ref 0.51–0.95)
EGFRCR SERPLBLD CKD-EPI 2021: 71 ML/MIN/1.73M2
EOSINOPHIL # BLD AUTO: 0.3 10E3/UL (ref 0–0.7)
EOSINOPHIL NFR BLD AUTO: 3 %
ERYTHROCYTE [DISTWIDTH] IN BLOOD BY AUTOMATED COUNT: 13.5 % (ref 10–15)
FASTING STATUS PATIENT QL REPORTED: ABNORMAL
FASTING STATUS PATIENT QL REPORTED: NORMAL
GLUCOSE SERPL-MCNC: 100 MG/DL (ref 70–99)
HCO3 SERPL-SCNC: 27 MMOL/L (ref 22–29)
HCT VFR BLD AUTO: 40.8 % (ref 35–47)
HDLC SERPL-MCNC: 62 MG/DL
HGB BLD-MCNC: 13.9 G/DL (ref 11.7–15.7)
IMM GRANULOCYTES # BLD: 0 10E3/UL
IMM GRANULOCYTES NFR BLD: 0 %
LDLC SERPL CALC-MCNC: 69 MG/DL
LYMPHOCYTES # BLD AUTO: 2.5 10E3/UL (ref 0.8–5.3)
LYMPHOCYTES NFR BLD AUTO: 35 %
MCH RBC QN AUTO: 30.4 PG (ref 26.5–33)
MCHC RBC AUTO-ENTMCNC: 34.1 G/DL (ref 31.5–36.5)
MCV RBC AUTO: 89 FL (ref 78–100)
MONOCYTES # BLD AUTO: 0.8 10E3/UL (ref 0–1.3)
MONOCYTES NFR BLD AUTO: 11 %
NEUTROPHILS # BLD AUTO: 3.6 10E3/UL (ref 1.6–8.3)
NEUTROPHILS NFR BLD AUTO: 50 %
NONHDLC SERPL-MCNC: 86 MG/DL
NRBC # BLD AUTO: 0 10E3/UL
NRBC BLD AUTO-RTO: 0 /100
PLATELET # BLD AUTO: 266 10E3/UL (ref 150–450)
POTASSIUM SERPL-SCNC: 3.3 MMOL/L (ref 3.4–5.3)
PROT SERPL-MCNC: 7.5 G/DL (ref 6.4–8.3)
RBC # BLD AUTO: 4.57 10E6/UL (ref 3.8–5.2)
SODIUM SERPL-SCNC: 139 MMOL/L (ref 135–145)
TRIGL SERPL-MCNC: 85 MG/DL
TSH SERPL DL<=0.005 MIU/L-ACNC: 2.25 UIU/ML (ref 0.3–4.2)
WBC # BLD AUTO: 7.3 10E3/UL (ref 4–11)

## 2025-02-28 PROCEDURE — G0463 HOSPITAL OUTPT CLINIC VISIT: HCPCS

## 2025-02-28 PROCEDURE — 84443 ASSAY THYROID STIM HORMONE: CPT | Mod: ZL | Performed by: FAMILY MEDICINE

## 2025-02-28 PROCEDURE — 80061 LIPID PANEL: CPT | Mod: ZL | Performed by: FAMILY MEDICINE

## 2025-02-28 PROCEDURE — G0439 PPPS, SUBSEQ VISIT: HCPCS | Performed by: FAMILY MEDICINE

## 2025-02-28 PROCEDURE — 1125F AMNT PAIN NOTED PAIN PRSNT: CPT | Performed by: FAMILY MEDICINE

## 2025-02-28 PROCEDURE — 36415 COLL VENOUS BLD VENIPUNCTURE: CPT | Mod: ZL | Performed by: FAMILY MEDICINE

## 2025-02-28 PROCEDURE — 85048 AUTOMATED LEUKOCYTE COUNT: CPT | Mod: ZL | Performed by: FAMILY MEDICINE

## 2025-02-28 PROCEDURE — G2211 COMPLEX E/M VISIT ADD ON: HCPCS | Performed by: FAMILY MEDICINE

## 2025-02-28 PROCEDURE — 3075F SYST BP GE 130 - 139MM HG: CPT | Performed by: FAMILY MEDICINE

## 2025-02-28 PROCEDURE — 3079F DIAST BP 80-89 MM HG: CPT | Performed by: FAMILY MEDICINE

## 2025-02-28 PROCEDURE — 85004 AUTOMATED DIFF WBC COUNT: CPT | Mod: ZL | Performed by: FAMILY MEDICINE

## 2025-02-28 PROCEDURE — 80053 COMPREHEN METABOLIC PANEL: CPT | Mod: ZL | Performed by: FAMILY MEDICINE

## 2025-02-28 PROCEDURE — 99214 OFFICE O/P EST MOD 30 MIN: CPT | Mod: 25 | Performed by: FAMILY MEDICINE

## 2025-02-28 RX ORDER — NYSTATIN 100000 [USP'U]/G
POWDER TOPICAL 2 TIMES DAILY
Qty: 60 G | Refills: 11 | Status: SHIPPED | OUTPATIENT
Start: 2025-02-28

## 2025-02-28 RX ORDER — HYDROCHLOROTHIAZIDE 25 MG/1
25 TABLET ORAL DAILY
Qty: 90 TABLET | Refills: 3 | Status: SHIPPED | OUTPATIENT
Start: 2025-02-28

## 2025-02-28 RX ORDER — ATORVASTATIN CALCIUM 20 MG/1
20 TABLET, FILM COATED ORAL DAILY
Qty: 90 TABLET | Refills: 3 | Status: SHIPPED | OUTPATIENT
Start: 2025-02-28

## 2025-02-28 RX ORDER — POTASSIUM CHLORIDE 1500 MG/1
20 TABLET, EXTENDED RELEASE ORAL DAILY
Qty: 90 TABLET | Refills: 3 | Status: SHIPPED | OUTPATIENT
Start: 2025-02-28

## 2025-02-28 RX ORDER — MECLIZINE HYDROCHLORIDE 25 MG/1
25 TABLET ORAL 3 TIMES DAILY PRN
Qty: 30 TABLET | Refills: 11 | Status: SHIPPED | OUTPATIENT
Start: 2025-02-28

## 2025-02-28 SDOH — HEALTH STABILITY: PHYSICAL HEALTH: ON AVERAGE, HOW MANY DAYS PER WEEK DO YOU ENGAGE IN MODERATE TO STRENUOUS EXERCISE (LIKE A BRISK WALK)?: 4 DAYS

## 2025-02-28 ASSESSMENT — SOCIAL DETERMINANTS OF HEALTH (SDOH): HOW OFTEN DO YOU GET TOGETHER WITH FRIENDS OR RELATIVES?: ONCE A WEEK

## 2025-02-28 ASSESSMENT — PAIN SCALES - GENERAL: PAINLEVEL_OUTOF10: MILD PAIN (3)

## 2025-02-28 NOTE — LETTER
Arlyn Holder  [address] [place] [address] [address] [place] [address]    2/28/2025      Dear Ms. Holder,      I wanted to let you know about your recent labs.    Your TSH was in normal range.    Your complete blood count was normal.    Your comprehensive metabolic profile showed that your potassium was just a tiny bit low.  I know you had said you been out of this medication for a couple days.  Since it is just a tiny bit below the normal range, getting back on this regularly should resolve the low level you have.    Your glucose was 100, which is okay.    Your lipid profile was in good range.    Please contact us at [phone] with any questions or concerns that you have.    I have attached your lab results for your records.        Sincerely,         Brooke Castaneda MD     Resulted Orders   Lipid Profile   Result Value Ref Range    Cholesterol 148 <200 mg/dL    Triglycerides 85 <150 mg/dL    Direct Measure HDL 62 >=50 mg/dL    LDL Cholesterol Calculated 69 <100 mg/dL    Non HDL Cholesterol 86 <130 mg/dL    Patient Fasting > 8hrs? Unknown     Narrative    Cholesterol  Desirable: < 200 mg/dL  Borderline High: 200 - 239 mg/dL  High: >= 240 mg/dL    Triglycerides  Normal: < 150 mg/dL  Borderline High: 150 - 199 mg/dL  High: 200-499 mg/dL  Very High: >= 500 mg/dL    Direct Measure HDL  Female: >= 50 mg/dL   Male: >= 40 mg/dL    LDL Cholesterol  Desirable: < 100 mg/dL  Above Desirable: 100 - 129 mg/dL   Borderline High: 130 - 159 mg/dL   High:  160 - 189 mg/dL   Very High: >= 190 mg/dL    Non HDL Cholesterol  Desirable: < 130 mg/dL  Above Desirable: 130 - 159 mg/dL  Borderline High: 160 - 189 mg/dL  High: 190 - 219 mg/dL  Very High: >= 220 mg/dL   Comprehensive metabolic panel   Result Value Ref Range    Sodium 139 135 - 145 mmol/L    Potassium 3.3 (L) 3.4 - 5.3 mmol/L    Carbon Dioxide (CO2) 27 22 - 29 mmol/L    Anion Gap 11 7 - 15 mmol/L    Urea Nitrogen 21.0 8.0 - 23.0 mg/dL    Creatinine 0.85 0.51 - 0.95 mg/dL    GFR  Estimate 71 >60 mL/min/1.73m2      Comment:      eGFR calculated using 2021 CKD-EPI equation.    Calcium 9.9 8.8 - 10.4 mg/dL    Chloride 101 98 - 107 mmol/L    Glucose 100 (H) 70 - 99 mg/dL    Alkaline Phosphatase 92 40 - 150 U/L    AST 26 0 - 45 U/L    ALT 26 0 - 50 U/L    Protein Total 7.5 6.4 - 8.3 g/dL    Albumin 4.5 3.5 - 5.2 g/dL    Bilirubin Total 0.8 <=1.2 mg/dL    Patient Fasting > 8hrs? Unknown    TSH with free T4 reflex   Result Value Ref Range    TSH 2.25 0.30 - 4.20 uIU/mL   CBC with platelets and differential   Result Value Ref Range    WBC Count 7.3 4.0 - 11.0 10e3/uL    RBC Count 4.57 3.80 - 5.20 10e6/uL    Hemoglobin 13.9 11.7 - 15.7 g/dL    Hematocrit 40.8 35.0 - 47.0 %    MCV 89 78 - 100 fL    MCH 30.4 26.5 - 33.0 pg    MCHC 34.1 31.5 - 36.5 g/dL    RDW 13.5 10.0 - 15.0 %    Platelet Count 266 150 - 450 10e3/uL    % Neutrophils 50 %    % Lymphocytes 35 %    % Monocytes 11 %    % Eosinophils 3 %    % Basophils 1 %    % Immature Granulocytes 0 %    NRBCs per 100 WBC 0 <1 /100    Absolute Neutrophils 3.6 1.6 - 8.3 10e3/uL    Absolute Lymphocytes 2.5 0.8 - 5.3 10e3/uL    Absolute Monocytes 0.8 0.0 - 1.3 10e3/uL    Absolute Eosinophils 0.3 0.0 - 0.7 10e3/uL    Absolute Basophils 0.1 0.0 - 0.2 10e3/uL    Absolute Immature Granulocytes 0.0 <=0.4 10e3/uL    Absolute NRBCs 0.0 10e3/uL

## 2025-02-28 NOTE — PROGRESS NOTES
Please get your lipid panel (cholesterol level) checked with next set of blood work.   Preventive Care Visit  [facility] AND [facility]  Brooke Castaneda MD, Family Medicine  Feb 28, 2025          Subjective   Arlyn is a 74 year old, presenting for the following:  Wellness Visit        2/28/2025     2:05 PM   Additional Questions   Roomed by Ambreen Pederson         Has noticed some blood in her stool off and on for the past year.  She does have hemorrhoids and is using preparation H as needed.  Itching at times.  If she eats canteloupe, helps her to have softer bowel movements.  The blood is typically present with harder bowel movements.      Has some skin lesions near her breasts.   Just started using pine tar soap to see if it would help.  Has redness and itching under her breast.  Has a red rash.  Places gauze under her breasts during the day.    Has been having issues with vertigo recently.  She had woken up at 3 am with vomiting and dizziness around Christmastime.  She had some old meclizine available and took it, which helped.  Seems better since then.  Position changes will set it off.  She had been shown some exercises to try in the past, which helps some.  Blue cheese and corn syrup and chocolate will set off episodes.      Having bilateral leg pain and low back pain.  She has had an MRI 2 years ago, which showed possible impingement of both L5 nerve roots.  She has had chronic fecal incontinence and bladder.  No saddle anesthesia.      Right hip is bothering her.  Hurts to walk.  Hurts on the lateral side of her hip.  Only can lay on that side for a little while at night.  Sometimes wakes her at night.    She ran out of her potassium a couple of days ago and needs a refill.    Advance Care Planning  Patient does not have a Health Care Directive: Discussed advance care planning with patient; however, patient declined at this time.      2/28/2025   General Health   How would you rate your overall physical health? (!) FAIR   Feel stress (tense, anxious, or unable to sleep) Not at all          2/28/2025   Nutrition   Diet: Low salt    Low fat/cholesterol    Breakfast skipped       Multiple values from one day are sorted in reverse-chronological order         2/28/2025   Exercise   Days per week of moderate/strenous exercise 4 days         2/28/2025   Social Factors   Frequency of gathering with friends or relatives Once a week   Worry food won't last until get money to buy more No   Food not last or not have enough money for food? No   Do you have housing? (Housing is defined as stable permanent housing and does not include staying ouside in a car, in a tent, in an abandoned building, in an overnight shelter, or couch-surfing.) Yes   Are you worried about losing your housing? No   Lack of transportation? No   Unable to get utilities (heat,electricity)? No         2/28/2025   Fall Risk   Fallen 2 or more times in the past year? No   Trouble with walking or balance? Yes   Gait Speed Test (Document in seconds) 6   Gait Speed Test Interpretation Greater than 5.01 seconds - ABNORMAL          2/28/2025   Activities of Daily Living- Home Safety   Needs help with the following daily activites None of the above   Safety concerns in the home None of the above         2/28/2025   Dental   Dentist two times every year? (!) NO         2/28/2025   Hearing Screening   Hearing concerns? None of the above         2/28/2025   Driving Risk Screening   Patient/family members have concerns about driving No         2/28/2025   General Alertness/Fatigue Screening   Have you been more tired than usual lately? (!) YES         2/28/2025   Urinary Incontinence Screening   Bothered by leaking urine in past 6 months No            Today's PHQ-2 Score:       2/28/2025     2:12 PM   PHQ-2 ( 1999 Pf[employer])   Q1: Little interest or pleasure in doing things 0   Q2: Feeling down, depressed or hopeless 0   PHQ-2 Score 0    Q1: Little interest or pleasure in doing things Not at all   Q2: Feeling down, depressed or hopeless Not at all    PHQ-2 Score 0       Patient-reported           2/28/2025   Substance Use   Alcohol more than 3/day or more than 7/wk Not Applicable   Do you have a current opioid prescription? No   How severe/bad is pain from 1 to 10? 3/10   Do you use any other substances recreationally? (!) PRESCRIPTION DRUGS     Social History     Tobacco Use    Smoking status: Never     Passive exposure: Yes    Smokeless tobacco: Never    Tobacco comments:     Quit smoking:  was a smoker   Vaping Use    Vaping status: Never Used   Substance Use Topics    Alcohol use: No     Alcohol/week: 0.0 standard drinks of alcohol    Drug use: No           11/24/2023   LAST FHS-7 RESULTS   1st degree relative breast or ovarian cancer Yes   Any relative bilateral breast cancer No   Any male have breast cancer No   Any ONE woman have BOTH breast AND ovarian cancer No   Any woman with breast cancer before 50yrs No   2 or more relatives with breast AND/OR ovarian cancer No   2 or more relatives with breast AND/OR bowel cancer No        Mammogram Screening - After age 74- determine frequency with patient based on health status, life expectancy and patient goals    ASCVD Risk   The 10-year ASCVD risk score (Storm SANTANA, et al., 2019) is: 25.4%    Values used to calculate the score:      Age: 74 years      Sex: Female      Is Non- : No      Diabetic: No      Tobacco smoker: No      Systolic Blood Pressure: 136 mmHg      Is BP treated: Yes      HDL Cholesterol: 51 mg/dL      Total Cholesterol: 142 mg/dL            Reviewed and updated as needed this visit by Provider                    Past Medical History:   Diagnosis Date    AC (acromioclavicular) arthritis 3/1/2018    Bunion of great toe of right foot     No Comments Provided    Carpal tunnel syndrome     11/18/2010    Complete tear of right rotator cuff 3/1/2018    Essential (primary) hypertension     No Comments Provided    Gastro-esophageal reflux disease without  esophagitis     12/3/2012    Hyperlipidemia     10/4/2011    Impingement syndrome of right shoulder     10/09/07    Personal history of other medical treatment (CODE)      3, Para 3 with three vaginal deliveries    S/P arthroscopy of right shoulder 2018    Thoracic aortic ectasia     3/23/2016,3.5-3.6 cm on CT 3/2016 - follow up in 1 year    Uterovaginal prolapse     preloader comment: selections available to preload differs from paper chart.   *Pelvic floor relaxation     Past Surgical History:   Procedure Laterality Date    ARTHROSCOPY SHOULDER ROTATOR CUFF REPAIR Right 2018    Procedure: ARTHROSCOPY SHOULDER ROTATOR CUFF REPAIR;  Right Shoulder Arthroscopy with Subacromial Decompression, Distal Clavicle Excision, Rotator Cuff Repair, Biceps Tenotomy, Extensive Debridement;  Surgeon: Wilner Painting DO;  Location: [facility] OR    AS SHOULDER ARTHROSCOPY, DX Right 18    Dr. Painting    BUNIONECTOMY      ,right    COLONOSCOPY      , normal    COLONOSCOPY  2015    Normal exam, F/U     COLPORRHAPHY ANTERIOR, POSTERIOR, COMBINED N/A 10/19/2020    Procedure: COLPORRHAPHY, COMBINED ANTEROPOSTERIOR, ENTEROCELE REPAIR, COLPOCLIESIS, Repair eroded mesh;  Surgeon: Wade Reyna MD;  Location: [facility] OR    CYSTOSCOPY N/A 10/19/2020    Procedure: CYSTOSCOPY;  Surgeon: Wade Reyna MD;  Location: [facility] OR    HYSTERECTOMY VAGINAL      ,for uterine prolapse with A&P repair.  Tube and ovaries still in place.    LAPAROSCOPY DIAGNOSTIC (GYN)      ,for pelvic adhesion    LAPAROSCOPY DIAGNOSTIC (GYN)      ,vaginal vault prolapse - Dr. Mumtaz Marshall - anterior/posterior repair with bladder mesh.    RELEASE CARPAL TUNNEL      Dr. Méndez    S/P RIGHT SHOULDER ARTHROSCOPY Right 2018     BP Readings from Last 3 Encounters:   25 136/84   23 122/80   23 132/80    Wt Readings from Last 3 Encounters:   25 68.7 kg (151 lb 6.4 oz)   23 68.3 kg (150 lb 9.6 oz)    01/31/23 68.9 kg (152 lb)                  Patient Active Problem List   Diagnosis    Arthritis of right wrist    Ascending aorta dilatation    Enthesopathy of hip region    Carpal tunnel syndrome    Degeneration of cervical intervertebral disc    Chondromalacia of patella    GERD (gastroesophageal reflux disease)    Hyperlipidemia    Hypertension    Osteoarthritis of ankle or foot    Plantar fasciitis    Symptomatic menopausal or female climacteric states    Disorder of bursae and tendons in shoulder region    Other joint derangement, not elsewhere classified, unspecified site    Tendonitis of wrist, right    Lumbosacral spondylosis without myelopathy    Lesion of lateral popliteal nerve    Vaginal vault prolapse, posthysterectomy    Cystocele with rectocele    Erosion of implanted vaginal mesh to surrounding tissue, initial encounter    Abscess of groin    Encounter for follow-up examination    Uterine prolapse     Past Surgical History:   Procedure Laterality Date    ARTHROSCOPY SHOULDER ROTATOR CUFF REPAIR Right 4/11/2018    Procedure: ARTHROSCOPY SHOULDER ROTATOR CUFF REPAIR;  Right Shoulder Arthroscopy with Subacromial Decompression, Distal Clavicle Excision, Rotator Cuff Repair, Biceps Tenotomy, Extensive Debridement;  Surgeon: Wilner Painting DO;  Location:  OR    AS SHOULDER ARTHROSCOPY, DX Right 4/11/18    Dr. Painting    BUNIONECTOMY      1999,right    COLONOSCOPY      2004, normal    COLONOSCOPY  03/16/2015    Normal exam, F/U 2025    COLPORRHAPHY ANTERIOR, POSTERIOR, COMBINED N/A 10/19/2020    Procedure: COLPORRHAPHY, COMBINED ANTEROPOSTERIOR, ENTEROCELE REPAIR, COLPOCLIESIS, Repair eroded mesh;  Surgeon: Wade Reyna MD;  Location:  OR    CYSTOSCOPY N/A 10/19/2020    Procedure: CYSTOSCOPY;  Surgeon: Wade Reyna MD;  Location: [facility] OR    HYSTERECTOMY VAGINAL      1982,for uterine prolapse with A&P repair.  Tube and ovaries still in place.    LAPAROSCOPY DIAGNOSTIC (GYN)      1989,for  pelvic adhesion    LAPAROSCOPY DIAGNOSTIC (GYN)      11/08,vaginal vault prolapse - Dr. Mumtaz Marshall - anterior/posterior repair with bladder mesh.    RELEASE CARPAL TUNNEL      Dr. Méndez    S/P RIGHT SHOULDER ARTHROSCOPY Right 04/11/2018       Social History     Tobacco Use    Smoking status: Never     Passive exposure: Yes    Smokeless tobacco: Never    Tobacco comments:     Quit smoking:  was a smoker   Substance Use Topics    Alcohol use: No     Alcohol/week: 0.0 standard drinks of alcohol     Family History   Problem Relation Age of Onset    Diabetes Father         Diabetes    Hypertension Father         Hypertension    Heart Disease Father         Heart Disease    Other - See Comments Father          hearing loss, kidney problems.    Heart Disease Mother         Heart Disease    Hypertension Mother         Hypertension    Cancer Maternal Grandmother         Cancer,bone cancer in her leg    Other - See Comments Maternal Grandmother         Psychiatric illness,depression    Heart Disease Son         Heart Disease,MI - first at age 48    Breast Cancer Sister         Cancer-breast    Diabetes Sister         Diabetes    Heart Disease Sister         Heart Disease,angioplasty CAD    Other - See Comments Sister         Right ear deafness - surgically repaired    Other - See Comments Sister         depression    Diabetes Sister         Diabetes    Hypertension Sister         Hypertension    Other - See Comments Sister         car accident age 75 with multiple fractures - in nursing home.    Heart Disease Brother         Heart Disease    Heart Disease Brother         Heart Disease    Hypertension Brother         Hypertension    Other - See Comments Brother         vertigo, also kidney problems.         Current Outpatient Medications   Medication Sig Dispense Refill    aspirin (ASA) 81 MG tablet Take by mouth daily 30 tablet     atorvastatin (LIPITOR) 20 MG tablet Take 1 tablet (20 mg) by mouth daily. 90  tablet 3    BIOTIN PO Take 6,000 mg by mouth 2 times daily Biotin with Collagen 3 tablets bid      Cyanocobalamin (VITAMIN B-12 CR) 1000 MCG TBCR       cyclobenzaprine (FLEXERIL) 10 MG tablet Take 1 tablet (10 mg) by mouth 3 times daily as needed for muscle spasms 15 tablet 0    hydrochlorothiazide (HYDRODIURIL) 25 MG tablet Take 1 tablet (25 mg) by mouth daily. 90 tablet 3    lecithin 400 MG CAPS Take 1 capsule by mouth 2 times daily      meclizine (ANTIVERT) 25 MG tablet Take 1 tablet (25 mg) by mouth 3 times daily as needed for dizziness. 30 tablet 11    Multiple Vitamins-Minerals (MENS MULTIVITAMIN PLUS) TABS  30 tablet     nystatin (MYCOSTATIN) 236720 UNIT/GM external powder Apply topically 2 times daily. 60 g 11    potassium chloride samantha ER (KLOR-CON M20) 20 MEQ CR tablet Take 1 tablet (20 mEq) by mouth daily. 90 tablet 3    tobramycin-dexAMETHasone (TOBRADEX) 0.3-0.1 % ophthalmic suspension       triamcinolone (KENALOG) 0.1 % external cream Apply topically 2 times daily 30 g 4    UNABLE TO FIND MEDICATION NAME: Arneca cream 2-3 times daily      vitamin E (TOCOPHEROL) 100 UNIT capsule       zinc gluconate 50 MG tablet Take 50 mg by mouth daily       Allergies   Allergen Reactions    Amoxicillin-Pot Clavulanate      Pt states she did not tolerate    Codeine Nausea and Vomiting    Diazepam      Other reaction(s): Hallucinations    Naproxen Other (See Comments)     Mouth sores    Sulfa Antibiotics Unknown    Chlorhexidine Rash    Sulfacetamide Rash     Had a reaction to sulfa drug but is not sure which one it was-states the reaction resulted in redness     Recent Labs   Lab Test 02/28/25  1510 09/14/23  1148 05/06/22  1457 04/11/22  1356 09/20/21  0756 10/01/20  1157 09/03/20  0952   LDL 69 66 78  --   --   --  61   HDL 62 51 55  --   --   --  47   TRIG 85 124 97  --   --   --  111   ALT 26 26  --  19  --   --  21   CR 0.85 0.80 0.88 0.82   < > 0.84 0.90   GFRESTIMATED 71 76 69 75   < > 67 62   GFRESTBLACK  --    "--   --   --   --  81 74   POTASSIUM 3.3* 3.7 3.3* 3.0*   < > 3.8 3.6   TSH 2.25 3.46 2.46  --   --   --   --     < > = values in this interval not displayed.      Current providers sharing in care for this patient include:  Patient Care Team:  Brooke Castaneda MD as PCP - General (Family Practice)  Brooke Castaneda MD as Assigned PCP    The following health maintenance items are reviewed in Epic and correct as of today:  Health Maintenance   Topic Date Due    ZOSTER IMMUNIZATION (1 of 2) Never done    DTAP/TDAP/TD IMMUNIZATION (3 - Td or Tdap) 12/03/2022    RSV VACCINE (1 - 1-dose 75+ series) Never done    INFLUENZA VACCINE (1) 09/01/2024    COVID-19 Vaccine (1 - 2024-25 season) Never done    MEDICARE ANNUAL WELLNESS VISIT  09/14/2024    BMP  09/14/2024    LIPID  09/14/2024    COLORECTAL CANCER SCREENING  03/16/2025    FALL RISK ASSESSMENT  02/28/2026    GLUCOSE  09/14/2026    ADVANCE CARE PLANNING  09/15/2028    DEXA  05/01/2034    HEPATITIS C SCREENING  Completed    PHQ-2 (once per calendar year)  Completed    Pneumococcal Vaccine: 50+ Years  Completed    HPV IMMUNIZATION  Aged Out    MENINGITIS IMMUNIZATION  Aged Out    MAMMO SCREENING  Discontinued         Review of Systems  Constitutional, HEENT, cardiovascular, pulmonary, GI, , musculoskeletal, neuro, skin, endocrine and psych systems are negative, except as otherwise noted.     Objective    Exam  /84   Pulse 70   Temp 97  F (36.1  C) (Tympanic)   Resp 16   Ht 1.473 m (4' 10\")   Wt 68.7 kg (151 lb 6.4 oz)   LMP  (LMP Unknown)   SpO2 98%   Breastfeeding No   BMI 31.64 kg/m     Estimated body mass index is 31.64 kg/m  as calculated from the following:    Height as of this encounter: 1.473 m (4' 10\").    Weight as of this encounter: 68.7 kg (151 lb 6.4 oz).    Physical Exam  Constitutional:       General: She is not in acute distress.     Appearance: She is well-developed.   HENT:      Head: Normocephalic.      Right Ear: " Tympanic membrane and external ear normal.      Left Ear: Tympanic membrane and external ear normal.      Nose: Nose normal.      Mouth/Throat:      Mouth: Mucous membranes are moist.      Pharynx: Oropharynx is clear. No oropharyngeal exudate or posterior oropharyngeal erythema.   Eyes:      General:         Right eye: No discharge.         Left eye: No discharge.      Conjunctiva/sclera: Conjunctivae normal.      Pupils: Pupils are equal, round, and reactive to light.   Neck:      Thyroid: No thyromegaly.      Trachea: No tracheal deviation.   Cardiovascular:      Rate and Rhythm: Normal rate and regular rhythm.      Pulses: Normal pulses.      Heart sounds: Normal heart sounds, S1 normal and S2 normal. No murmur heard.     No friction rub. No gallop. No S3 or S4 sounds.   Pulmonary:      Effort: Pulmonary effort is normal. No respiratory distress.      Breath sounds: Normal breath sounds. No wheezing or rales.      Comments: Breast exam:  No masses palpable bilaterally.  No skin changes, tethering or axillary lymphadenopathy bilaterally.    Abdominal:      General: Bowel sounds are normal. There is no distension.      Palpations: Abdomen is soft. There is no mass.      Tenderness: There is no abdominal tenderness.   Genitourinary:     Comments: Pelvic:  cystocele noted.    Rectal:  external hemorrhoids noted without evidence of recent bleeding.  Musculoskeletal:         General: Normal range of motion.      Cervical back: Neck supple.   Lymphadenopathy:      Cervical: No cervical adenopathy.   Skin:     General: Skin is warm and dry.      Findings: No rash.      Comments: She has multiple raised rough lesions consistent with seborrheic keratoses between her breast.  Below her breast there are red rashes bilaterally.   Neurological:      Mental Status: She is alert and oriented to person, place, and time.      Motor: No abnormal muscle tone.      Deep Tendon Reflexes: Reflexes are normal and symmetric.    Psychiatric:         Mood and Affect: Mood normal.         Thought Content: Thought content normal.         Judgment: Judgment normal.               2/28/2025   Mini Cog   Clock Draw Score 2 Normal   3 Item Recall 3 objects recalled   Mini Cog Total Score 5              ICD-10-CM    1. Encounter for Medicare annual wellness exam  Z00.00       2. Visit for screening mammogram  Z12.31 MA Screening Bilateral w/ Brad      3. Postmenopause  Z78.0 DX Bone Density      4. External hemorrhoids  K64.4       5. Seborrheic keratoses  L82.1       6. Benign paroxysmal positional vertigo, unspecified laterality  H81.10 meclizine (ANTIVERT) 25 MG tablet      7. Lumbar radiculopathy  M54.16 MR Lumbar Spine w/o Contrast      8. Yeast infection of the skin  B37.2 nystatin (MYCOSTATIN) 161064 UNIT/GM external powder      9. Hip pain, right  M25.551       10. Primary hypertension  I10 Comprehensive metabolic panel     hydrochlorothiazide (HYDRODIURIL) 25 MG tablet     Comprehensive metabolic panel      11. Hypokalemia  E87.6 potassium chloride samantha ER (KLOR-CON M20) 20 MEQ CR tablet      12. Hyperlipidemia, unspecified hyperlipidemia type  E78.5 Lipid Profile     atorvastatin (LIPITOR) 20 MG tablet     Lipid Profile      13. Screening for deficiency anemia  Z13.0 CBC with Platelets & Differential     CBC with Platelets & Differential      14. Screening for thyroid disorder  Z13.29 TSH with free T4 reflex     TSH with free T4 reflex          Mammogram is ordered.  DEXA ordered.  Colonoscopy deferred due to age greater than 75.  Pap deferred due to age greater than 65.  AAA screening previously was normal.  She declined Tdap, Shingrix and RSV vaccines.  Declines COVID-vaccine as well.  Declines flu vaccine as well.  Prevnar is up-to-date.  See #1.  See #1.  External hemorrhoids are noted.  These likely are the cause for the rectal bleeding.  Offered referral for colonoscopy, which she declines at this time.  Also offered referral to  general surgery to consider banding, which she declines for now.  The lesions between her breasts appear to be seborrheic keratoses.  Reassurance given.  Discussed that these could be removed if desired.  Meclizine refilled.  Offered repeat MRI, which she is interested in.  This is ordered.  Consider steroid injection versus PT versus other based on results.  Prescription for nystatin sent to pharmacy.  Likely is due to bursitis, but could be referred from her back as well.  Discussed that she could consider a steroid injection in her bursa to see if this helps her pain.  Discussed that this would be both therapeutic and help with determining source of pain as well.  She will consider this and let us know if she wants this set up in the future.  Blood pressure is stable.  Refills as above.  Labs as above.  Refills as above.  Labs as above.  Refills as above.  Labs as above.  Labs as above.  Labs as above.      No follow-ups on file.     The longitudinal plan of care for the diagnosis(es)/condition(s) as documented were addressed during this visit. Due to the added complexity in care, I will continue to support Arlyn in the subsequent management and with ongoing continuity of care.      Brooke Castaneda MD

## 2025-02-28 NOTE — PATIENT INSTRUCTIONS
Patient Education   Preventive Care Advice   This is general advice given by our system to help you stay healthy. However, your care team may have specific advice just for you. Please talk to your care team about your preventive care needs.  Nutrition  Eat 5 or more servings of fruits and vegetables each day.  Try wheat bread, brown rice and whole grain pasta (instead of white bread, rice, and pasta).  Get enough calcium and vitamin D. Check the label on foods and aim for 100% of the RDA (recommended daily allowance).  Lifestyle  Exercise at least 150 minutes each week  (30 minutes a day, 5 days a week).  Do muscle strengthening activities 2 days a week. These help control your weight and prevent disease.  No smoking.  Wear sunscreen to prevent skin cancer.  Have a dental exam and cleaning every 6 months.  Yearly exams  See your health care team every year to talk about:  Any changes in your health.  Any medicines your care team has prescribed.  Preventive care, family planning, and ways to prevent chronic diseases.  Shots (vaccines)   HPV shots (up to age 26), if you've never had them before.  Hepatitis B shots (up to age 59), if you've never had them before.  COVID-19 shot: Get this shot when it's due.  Flu shot: Get a flu shot every year.  Tetanus shot: Get a tetanus shot every 10 years.  Pneumococcal, hepatitis A, and RSV shots: Ask your care team if you need these based on your risk.  Shingles shot (for age 50 and up)  General health tests  Diabetes screening:  Starting at age 35, Get screened for diabetes at least every 3 years.  If you are younger than age 35, ask your care team if you should be screened for diabetes.  Cholesterol test: At age 39, start having a cholesterol test every 5 years, or more often if advised.  Bone density scan (DEXA): At age 50, ask your care team if you should have this scan for osteoporosis (brittle bones).  Hepatitis C: Get tested at least once in your life.  STIs (sexually  transmitted infections)  Before age 24: Ask your care team if you should be screened for STIs.  After age 24: Get screened for STIs if you're at risk. You are at risk for STIs (including HIV) if:  You are sexually active with more than one person.  You don't use condoms every time.  You or a partner was diagnosed with a sexually transmitted infection.  If you are at risk for HIV, ask about PrEP medicine to prevent HIV.  Get tested for HIV at least once in your life, whether you are at risk for HIV or not.  Cancer screening tests  Cervical cancer screening: If you have a cervix, begin getting regular cervical cancer screening tests starting at age 21.  Breast cancer scan (mammogram): If you've ever had breasts, begin having regular mammograms starting at age 40. This is a scan to check for breast cancer.  Colon cancer screening: It is important to start screening for colon cancer at age 45.  Have a colonoscopy test every 10 years (or more often if you're at risk) Or, ask your provider about stool tests like a FIT test every year or Cologuard test every 3 years.  To learn more about your testing options, visit:   .  For help making a decision, visit:   https://bit.ly/vt58728.  Prostate cancer screening test: If you have a prostate, ask your care team if a prostate cancer screening test (PSA) at age 55 is right for you.  Lung cancer screening: If you are a current or former smoker ages 50 to 80, ask your care team if ongoing lung cancer screenings are right for you.  For informational purposes only. Not to replace the advice of your health care provider. Copyright   2023 [facility]. All rights reserved. Clinically reviewed by the [facility]. [employer] 932799 - REV 01/24.  Preventing Falls: Care Instructions  Injuries and health problems such as trouble walking or poor eyesight can increase your risk of falling. So can some medicines. But there are things you can do to help  "prevent falls. You can exercise to get stronger. You can also arrange your home to make it safer.    Talk to your doctor about the medicines you take. Ask if any of them increase the risk of falls and whether they can be changed or stopped.   Try to exercise regularly. It can help improve your strength and balance. This can help lower your risk of falling.         Practice fall safety and prevention.   Wear low-heeled shoes that fit well and give your feet good support. Talk to your doctor if you have foot problems that make this hard.  Carry a cellphone or wear a medical alert device that you can use to call for help.  Use stepladders instead of chairs to reach high objects. Don't climb if you're at risk for falls. Ask for help, if needed.  Wear the correct eyeglasses, if you need them.        Make your home safer.   Remove rugs, cords, clutter, and furniture from walkways.  Keep your house well lit. Use night-lights in hallways and bathrooms.  Install and use sturdy handrails on stairways.  Wear nonskid footwear, even inside. Don't walk barefoot or in socks without shoes.        Be safe outside.   Use handrails, curb cuts, and ramps whenever possible.  Keep your hands free by using a shoulder bag or backpack.  Try to walk in well-lit areas. Watch out for uneven ground, changes in pavement, and debris.  Be careful in the winter. Walk on the grass or gravel when sidewalks are slippery. Use de-icer on steps and walkways. Add non-slip devices to shoes.    Put grab bars and nonskid mats in your shower or tub and near the toilet. Try to use a shower chair or bath bench when bathing.   Get into a tub or shower by putting in your weaker leg first. Get out with your strong side first. Have a phone or medical alert device in the bathroom with you.   Where can you learn more?  Go to https://www.[employer].net/patiented  Enter G117 in the search box to learn more about \"Preventing Falls: Care Instructions.\"  Current as of: " July 31, 2024  Content Version: 14.3    2024 [employer].   Care instructions adapted under license by your healthcare professional. If you have questions about a medical condition or this instruction, always ask your healthcare professional. [employer] disclaims any warranty or liability for your use of this information.    Learning About Sleeping Well  What does sleeping well mean?     Sleeping well means getting enough sleep to feel good and stay healthy. How much sleep is enough varies among people.  The number of hours you sleep and how you feel when you wake up are both important. If you do not feel refreshed, you probably need more sleep. Another sign of not getting enough sleep is feeling tired during the day.  Experts recommend that adults get at least 7 or more hours of sleep per day. Children and older adults need more sleep.  Why is getting enough sleep important?  Getting enough quality sleep is a basic part of good health. When your sleep suffers, your physical health, mood, and your thoughts can suffer too. You may find yourself feeling more grumpy or stressed. Not getting enough sleep also can lead to serious problems, including injury, accidents, anxiety, and depression.  What might cause poor sleeping?  Many things can cause sleep problems, including:  Changes to your sleep schedule.  Stress. Stress can be caused by fear about a single event, such as giving a speech. Or you may have ongoing stress, such as worry about work or school.  Depression, anxiety, and other mental or emotional conditions.  Changes in your sleep habits or surroundings. This includes changes that happen where you sleep, such as noise, light, or sleeping in a different bed. It also includes changes in your sleep pattern, such as having jet lag or working a late shift.  Health problems, such as pain, breathing problems, and restless legs syndrome.  Lack of regular exercise.  Using alcohol, nicotine, or  "caffeine before bed.  How can you help yourself?  Here are some tips that may help you sleep more soundly and wake up feeling more refreshed.  Your sleeping area   Use your bedroom only for sleeping and sex. A bit of light reading may help you fall asleep. But if it doesn't, do your reading elsewhere in the house. Try not to use your TV, computer, smartphone, or tablet while you are in bed.  Be sure your bed is big enough to stretch out comfortably, especially if you have a sleep partner.  Keep your bedroom quiet, dark, and cool. Use curtains, blinds, or a sleep mask to block out light. To block out noise, use earplugs, soothing music, or a \"white noise\" machine.  Your evening and bedtime routine   Create a relaxing bedtime routine. You might want to take a warm shower or bath, or listen to soothing music.  Go to bed at the same time every night. And get up at the same time every morning, even if you feel tired.  What to avoid   Limit caffeine (coffee, tea, caffeinated sodas) during the day, and don't have any for at least 6 hours before bedtime.  Avoid drinking alcohol before bedtime. Alcohol can cause you to wake up more often during the night.  Try not to smoke or use tobacco, especially in the evening. Nicotine can keep you awake.  Limit naps during the day, especially close to bedtime.  Avoid lying in bed awake for too long. If you can't fall asleep or if you wake up in the middle of the night and can't get back to sleep within about 20 minutes, get out of bed and go to another room until you feel sleepy.  Avoid taking medicine right before bed that may keep you awake or make you feel hyper or energized. Your doctor can tell you if your medicine may do this and if you can take it earlier in the day.  If you can't sleep   Imagine yourself in a peaceful, pleasant scene. Focus on the details and feelings of being in a place that is relaxing.  Get up and do a quiet or boring activity until you feel sleepy.  Avoid " "drinking any liquids before going to bed to help prevent waking up often to use the bathroom.  Where can you learn more?  Go to https://www.[employer].net/patiented  Enter J942 in the search box to learn more about \"Learning About Sleeping Well.\"  Current as of: July 31, 2024  Content Version: 14.3    2024 [employer].   Care instructions adapted under license by your healthcare professional. If you have questions about a medical condition or this instruction, always ask your healthcare professional. [employer] disclaims any warranty or liability for your use of this information.    Substance Use Disorder: Care Instructions  Overview     You can improve your life and health by stopping your use of alcohol or drugs. When you don't drink or use drugs, you may feel and sleep better. You may get along better with your family, friends, and coworkers. There are medicines and programs that can help with substance use disorder.  How can you care for yourself at home?  Here are some ways to help you stay sober and prevent relapse.  If you have been given medicine to help keep you sober or reduce your cravings, be sure to take it exactly as prescribed.  Talk to your doctor about programs that can help you stop using drugs or drinking alcohol.  Do not keep alcohol or drugs in your home.  Plan ahead. Think about what you'll say if other people ask you to drink or use drugs. Try not to spend time with people who drink or use drugs.  Use the time and money spent on drinking or drugs to do something that's important to you.  Preventing a relapse  Have a plan to deal with relapse. Learn to recognize changes in your thinking that lead you to drink or use drugs. Get help before you start to drink or use drugs again.  Try to stay away from situations, friends, or places that may lead you to drink or use drugs.  If you feel the need to drink alcohol or use drugs again, seek help right away. Call a trusted friend " or family member. Some people get support from organizations such as Narcotics Anonymous or [employer] or from treatment facilities.  If you relapse, get help as soon as you can. Some people make a plan with another person that outlines what they want that person to do for them if they relapse. The plan usually includes how to handle the relapse and who to notify in case of relapse.  Don't give up. Remember that a relapse doesn't mean that you have failed. Use the experience to learn the triggers that lead you to drink or use drugs. Then quit again. Recovery is a lifelong process. Many people have several relapses before they are able to quit for good.  Follow-up care is a key part of your treatment and safety. Be sure to make and go to all appointments, and call your doctor if you are having problems. It's also a good idea to know your test results and keep a list of the medicines you take.  When should you call for help?   Call 911  anytime you think you may need emergency care. For example, call if you or someone else:    Has overdosed or has withdrawal signs. Be sure to tell the emergency workers that you are or someone else is using or trying to quit using drugs. Overdose or withdrawal signs may include:  Losing consciousness.  Seizure.  Seeing or hearing things that aren't there (hallucinations).     Is thinking or talking about suicide or harming others.   Where to get help 24 hours a day, 7 days a week   If you or someone you know talks about suicide, self-harm, a mental health crisis, a substance use crisis, or any other kind of emotional distress, get help right away. You can:    Call the Suicide and Crisis Lifeline at 847.     Call 4-174-478-TALK (1-576.547.3552).     Text HOME to 780705 to access the Crisis Text Line.   Consider saving these numbers in your phone.  Go to [email or web address] for more information or to chat online.  Call your doctor now or seek immediate medical care if:    You are having  "withdrawal symptoms. These may include nausea or vomiting, sweating, shakiness, and anxiety.   Watch closely for changes in your health, and be sure to contact your doctor if:    You have a relapse.     You need more help or support to stop.   Where can you learn more?  Go to https://www.[employer].net/patiented  Enter H573 in the search box to learn more about \"Substance Use Disorder: Care Instructions.\"  Current as of: November 15, 2023  Content Version: 14.3    2024 [employer].   Care instructions adapted under license by your healthcare professional. If you have questions about a medical condition or this instruction, always ask your healthcare professional. [employer] disclaims any warranty or liability for your use of this information.       "

## 2025-03-07 ENCOUNTER — HOSPITAL ENCOUNTER (OUTPATIENT)
Dept: MRI IMAGING | Facility: OTHER | Age: 75
Discharge: HOME | End: 2025-03-07
Attending: FAMILY MEDICINE | Admitting: FAMILY MEDICINE
Payer: MEDICARE

## 2025-03-07 DIAGNOSIS — M54.16 LUMBAR RADICULOPATHY: ICD-10-CM

## 2025-03-07 PROCEDURE — 72148 MRI LUMBAR SPINE W/O DYE: CPT

## 2025-04-08 ENCOUNTER — HOSPITAL ENCOUNTER (OUTPATIENT)
Dept: MAMMOGRAPHY | Facility: OTHER | Age: 75
Discharge: HOME | End: 2025-04-08
Attending: FAMILY MEDICINE
Payer: MEDICARE

## 2025-04-08 ENCOUNTER — HOSPITAL ENCOUNTER (OUTPATIENT)
Dept: BONE DENSITY | Facility: OTHER | Age: 75
Discharge: HOME | End: 2025-04-08
Attending: FAMILY MEDICINE
Payer: MEDICARE

## 2025-04-08 DIAGNOSIS — Z12.31 VISIT FOR SCREENING MAMMOGRAM: ICD-10-CM

## 2025-04-08 DIAGNOSIS — Z78.0 POSTMENOPAUSE: ICD-10-CM

## 2025-04-08 PROCEDURE — 77063 BREAST TOMOSYNTHESIS BI: CPT

## 2025-04-08 PROCEDURE — 77067 SCR MAMMO BI INCL CAD: CPT

## 2025-04-08 PROCEDURE — 77080 DXA BONE DENSITY AXIAL: CPT

## (undated) DEVICE — NDL SCORPION ARTHREX KNEE AR-13990N

## (undated) DEVICE — DRSG GAUZE 4X4" 3033

## (undated) DEVICE — SOL WATER 1500ML

## (undated) DEVICE — KIT SHOULDER POSITIONING BEACH CHAIR ARM HOLDER AR-1644

## (undated) DEVICE — SU VICRYL 1 CT-2 27" J335H

## (undated) DEVICE — SPONGE RAY-TEC 4X4" 7317

## (undated) DEVICE — DRAPE STERI U 1015

## (undated) DEVICE — SU CHROMIC 3-0 SH 27" G122H

## (undated) DEVICE — DRSG TEGADERM 3 1/2X6" 3589

## (undated) DEVICE — TUBING IRR TUR Y TYPE 2C4041

## (undated) DEVICE — ABLATOR ARTHREX APOLLO RF MP90 ASPIRATING 90DEG AR-9811

## (undated) DEVICE — PAD FLOOR SURGISAFE 46X40" 84610

## (undated) DEVICE — SU VICRYL 2-0 CT-1 36" UND J945H

## (undated) DEVICE — SU MONOCRYL 3-0 PS-2 18" UND Y497G

## (undated) DEVICE — TUBING SUCTION 10'X3/16" N510

## (undated) DEVICE — GLOVE PROTEXIS POWDER FREE SMT 7.5  2D72PT75X

## (undated) DEVICE — ESU GROUND PAD ADULT W/CORD E7507

## (undated) DEVICE — SU CHROMIC 2-0 SH 27" G123H

## (undated) DEVICE — SU VICRYL 2-0 CT-1 18' J739D

## (undated) DEVICE — JELLY LUBRICATING SURGILUBE 2OZ TUBE

## (undated) DEVICE — Device

## (undated) DEVICE — TUBING SET ARTHREX DUALWAVE OUTFLOW AR-6430

## (undated) DEVICE — PACK LITHOTOMY SBA15LIFCA

## (undated) DEVICE — DRAPE STERI TOWEL LG 1010

## (undated) DEVICE — CANNULA PASSPORT BUTTON 8X3CM AR-6592-08-30

## (undated) DEVICE — SU TIGERTAPE 2MMX7"  AR-7237-7T

## (undated) DEVICE — SUCTION TIP YANKAUER W/O VENT K86

## (undated) DEVICE — PANTIES MESH LG/XLG 2PK 706M2

## (undated) DEVICE — SU FIBERWIRE 2 38"  AR-7200

## (undated) DEVICE — PAD PERI INDIV WRAP 11" 2022A

## (undated) DEVICE — GLOVE PROTEXIS POWDER FREE SMT 8.0  2D72PT80X

## (undated) DEVICE — CATH TRAY FOLEY SURESTEP 16FR W/URINE MTR STATLK LF A303416A

## (undated) DEVICE — SU ETHILON 3-0 FS-1 18" 669H

## (undated) DEVICE — SUCTION MANIFOLD NEPTUNE 2 SYS 4 PORT 0702-020-000

## (undated) DEVICE — SLEEVE COMPRESSION SCD KNEE MED 74022

## (undated) DEVICE — COVER LIGHT HANDLE LT-F02

## (undated) DEVICE — PREP CHLORAPREP 26ML TINTED GREEN 260825

## (undated) DEVICE — BLADE KNIFE SURG 15 SAFETY 373915

## (undated) DEVICE — GLOVE PROTEXIS BLUE W/NEU-THERA 8.5  2D73EB85

## (undated) DEVICE — ARTHROSCOPIC CANNULA 7MMX7CM PURPLE  AR-6550

## (undated) DEVICE — PACK SHOULDER ARTHROSCOPY SOP15SAFCA

## (undated) RX ORDER — SODIUM CHLORIDE, SODIUM LACTATE, POTASSIUM CHLORIDE, CALCIUM CHLORIDE 600; 310; 30; 20 MG/100ML; MG/100ML; MG/100ML; MG/100ML
INJECTION, SOLUTION INTRAVENOUS
Status: DISPENSED
Start: 2020-10-19

## (undated) RX ORDER — LIDOCAINE HYDROCHLORIDE AND EPINEPHRINE 10; 10 MG/ML; UG/ML
INJECTION, SOLUTION INFILTRATION; PERINEURAL
Status: DISPENSED
Start: 2020-10-19

## (undated) RX ORDER — PANTOPRAZOLE SODIUM 40 MG/1
TABLET, DELAYED RELEASE ORAL
Status: DISPENSED
Start: 2018-04-11

## (undated) RX ORDER — ROPIVACAINE HYDROCHLORIDE 5 MG/ML
INJECTION, SOLUTION EPIDURAL; INFILTRATION; PERINEURAL
Status: DISPENSED
Start: 2018-04-11

## (undated) RX ORDER — PHENAZOPYRIDINE HYDROCHLORIDE 100 MG/1
TABLET, FILM COATED ORAL
Status: DISPENSED
Start: 2020-10-19

## (undated) RX ORDER — PHENYLEPHRINE HCL IN 0.9% NACL 1 MG/10 ML
SYRINGE (ML) INTRAVENOUS
Status: DISPENSED
Start: 2018-04-11

## (undated) RX ORDER — FENTANYL CITRATE 50 UG/ML
INJECTION, SOLUTION INTRAMUSCULAR; INTRAVENOUS
Status: DISPENSED
Start: 2020-10-19

## (undated) RX ORDER — ONDANSETRON 2 MG/ML
INJECTION INTRAMUSCULAR; INTRAVENOUS
Status: DISPENSED
Start: 2018-04-11

## (undated) RX ORDER — DEXAMETHASONE SODIUM PHOSPHATE 10 MG/ML
INJECTION, SOLUTION INTRAMUSCULAR; INTRAVENOUS
Status: DISPENSED
Start: 2018-04-11

## (undated) RX ORDER — CEFAZOLIN SODIUM 2 G/100ML
INJECTION, SOLUTION INTRAVENOUS
Status: DISPENSED
Start: 2018-04-11

## (undated) RX ORDER — PROPOFOL 10 MG/ML
INJECTION, EMULSION INTRAVENOUS
Status: DISPENSED
Start: 2018-04-11

## (undated) RX ORDER — CEFAZOLIN SODIUM 2 G/100ML
INJECTION, SOLUTION INTRAVENOUS
Status: DISPENSED
Start: 2020-10-19

## (undated) RX ORDER — ONDANSETRON 2 MG/ML
INJECTION INTRAMUSCULAR; INTRAVENOUS
Status: DISPENSED
Start: 2020-10-19

## (undated) RX ORDER — PROPOFOL 10 MG/ML
INJECTION, EMULSION INTRAVENOUS
Status: DISPENSED
Start: 2020-10-19

## (undated) RX ORDER — LIDOCAINE HYDROCHLORIDE 20 MG/ML
INJECTION, SOLUTION EPIDURAL; INFILTRATION; INTRACAUDAL; PERINEURAL
Status: DISPENSED
Start: 2018-04-11

## (undated) RX ORDER — ACETAMINOPHEN 10 MG/ML
INJECTION, SOLUTION INTRAVENOUS
Status: DISPENSED
Start: 2018-04-11

## (undated) RX ORDER — LIDOCAINE HYDROCHLORIDE 20 MG/ML
INJECTION, SOLUTION EPIDURAL; INFILTRATION; INTRACAUDAL; PERINEURAL
Status: DISPENSED
Start: 2020-10-19

## (undated) RX ORDER — POTASSIUM CHLORIDE 20MEQ/15ML
LIQUID (ML) ORAL
Status: DISPENSED
Start: 2022-04-11

## (undated) RX ORDER — DEXAMETHASONE SODIUM PHOSPHATE 4 MG/ML
INJECTION, SOLUTION INTRA-ARTICULAR; INTRALESIONAL; INTRAMUSCULAR; INTRAVENOUS; SOFT TISSUE
Status: DISPENSED
Start: 2018-04-11

## (undated) RX ORDER — LIDOCAINE HYDROCHLORIDE 10 MG/ML
INJECTION, SOLUTION EPIDURAL; INFILTRATION; INTRACAUDAL; PERINEURAL
Status: DISPENSED
Start: 2018-04-11

## (undated) RX ORDER — DEXAMETHASONE SODIUM PHOSPHATE 4 MG/ML
INJECTION, SOLUTION INTRA-ARTICULAR; INTRALESIONAL; INTRAMUSCULAR; INTRAVENOUS; SOFT TISSUE
Status: DISPENSED
Start: 2020-10-19